# Patient Record
Sex: FEMALE | Race: OTHER | HISPANIC OR LATINO | URBAN - METROPOLITAN AREA
[De-identification: names, ages, dates, MRNs, and addresses within clinical notes are randomized per-mention and may not be internally consistent; named-entity substitution may affect disease eponyms.]

---

## 2020-07-01 ENCOUNTER — INPATIENT (INPATIENT)
Facility: HOSPITAL | Age: 75
LOS: 7 days | Discharge: NURSING, REHAB OR LONG-TERM CARE | DRG: 300 | End: 2020-07-09
Attending: STUDENT IN AN ORGANIZED HEALTH CARE EDUCATION/TRAINING PROGRAM | Admitting: STUDENT IN AN ORGANIZED HEALTH CARE EDUCATION/TRAINING PROGRAM
Payer: MEDICAID

## 2020-07-01 VITALS
WEIGHT: 164.91 LBS | SYSTOLIC BLOOD PRESSURE: 163 MMHG | DIASTOLIC BLOOD PRESSURE: 70 MMHG | TEMPERATURE: 98 F | OXYGEN SATURATION: 98 % | RESPIRATION RATE: 18 BRPM | HEART RATE: 88 BPM

## 2020-07-01 DIAGNOSIS — I21.4 NON-ST ELEVATION (NSTEMI) MYOCARDIAL INFARCTION: ICD-10-CM

## 2020-07-01 DIAGNOSIS — I80.219 PHLEBITIS AND THROMBOPHLEBITIS OF UNSPECIFIED ILIAC VEIN: ICD-10-CM

## 2020-07-01 DIAGNOSIS — I82.90 ACUTE EMBOLISM AND THROMBOSIS OF UNSPECIFIED VEIN: ICD-10-CM

## 2020-07-01 DIAGNOSIS — I69.359 HEMIPLEGIA AND HEMIPARESIS FOLLOWING CEREBRAL INFARCTION AFFECTING UNSPECIFIED SIDE: ICD-10-CM

## 2020-07-01 DIAGNOSIS — F03.90 UNSPECIFIED DEMENTIA WITHOUT BEHAVIORAL DISTURBANCE: ICD-10-CM

## 2020-07-01 DIAGNOSIS — Z29.9 ENCOUNTER FOR PROPHYLACTIC MEASURES, UNSPECIFIED: ICD-10-CM

## 2020-07-01 LAB
A1C WITH ESTIMATED AVERAGE GLUCOSE RESULT: 5.1 % — SIGNIFICANT CHANGE UP (ref 4–5.6)
ALBUMIN SERPL ELPH-MCNC: 3.7 G/DL — SIGNIFICANT CHANGE UP (ref 3.5–5)
ALP SERPL-CCNC: 58 U/L — SIGNIFICANT CHANGE UP (ref 40–120)
ALT FLD-CCNC: 33 U/L DA — SIGNIFICANT CHANGE UP (ref 10–60)
ANION GAP SERPL CALC-SCNC: 9 MMOL/L — SIGNIFICANT CHANGE UP (ref 5–17)
APTT BLD: 28.4 SEC — SIGNIFICANT CHANGE UP (ref 27.5–35.5)
AST SERPL-CCNC: 23 U/L — SIGNIFICANT CHANGE UP (ref 10–40)
BASOPHILS # BLD AUTO: 0.02 K/UL — SIGNIFICANT CHANGE UP (ref 0–0.2)
BASOPHILS NFR BLD AUTO: 0.5 % — SIGNIFICANT CHANGE UP (ref 0–2)
BILIRUB SERPL-MCNC: 1.1 MG/DL — SIGNIFICANT CHANGE UP (ref 0.2–1.2)
BUN SERPL-MCNC: 8 MG/DL — SIGNIFICANT CHANGE UP (ref 7–18)
CALCIUM SERPL-MCNC: 9.8 MG/DL — SIGNIFICANT CHANGE UP (ref 8.4–10.5)
CHLORIDE SERPL-SCNC: 105 MMOL/L — SIGNIFICANT CHANGE UP (ref 96–108)
CK MB BLD-MCNC: <2.8 % — SIGNIFICANT CHANGE UP (ref 0–3.5)
CK MB CFR SERPL CALC: <1 NG/ML — SIGNIFICANT CHANGE UP (ref 0–3.6)
CK SERPL-CCNC: 36 U/L — SIGNIFICANT CHANGE UP (ref 21–215)
CO2 SERPL-SCNC: 26 MMOL/L — SIGNIFICANT CHANGE UP (ref 22–31)
CREAT SERPL-MCNC: 0.66 MG/DL — SIGNIFICANT CHANGE UP (ref 0.5–1.3)
EOSINOPHIL # BLD AUTO: 0.11 K/UL — SIGNIFICANT CHANGE UP (ref 0–0.5)
EOSINOPHIL NFR BLD AUTO: 2.8 % — SIGNIFICANT CHANGE UP (ref 0–6)
ESTIMATED AVERAGE GLUCOSE: 100 MG/DL — SIGNIFICANT CHANGE UP (ref 68–114)
GLUCOSE SERPL-MCNC: 118 MG/DL — HIGH (ref 70–99)
HCT VFR BLD CALC: 41.2 % — SIGNIFICANT CHANGE UP (ref 34.5–45)
HGB BLD-MCNC: 13.1 G/DL — SIGNIFICANT CHANGE UP (ref 11.5–15.5)
IMM GRANULOCYTES NFR BLD AUTO: 0.3 % — SIGNIFICANT CHANGE UP (ref 0–1.5)
INR BLD: 1.09 RATIO — SIGNIFICANT CHANGE UP (ref 0.88–1.16)
LIDOCAIN IGE QN: 165 U/L — SIGNIFICANT CHANGE UP (ref 73–393)
LYMPHOCYTES # BLD AUTO: 1.3 K/UL — SIGNIFICANT CHANGE UP (ref 1–3.3)
LYMPHOCYTES # BLD AUTO: 32.7 % — SIGNIFICANT CHANGE UP (ref 13–44)
MCHC RBC-ENTMCNC: 27.9 PG — SIGNIFICANT CHANGE UP (ref 27–34)
MCHC RBC-ENTMCNC: 31.8 GM/DL — LOW (ref 32–36)
MCV RBC AUTO: 87.7 FL — SIGNIFICANT CHANGE UP (ref 80–100)
MONOCYTES # BLD AUTO: 0.28 K/UL — SIGNIFICANT CHANGE UP (ref 0–0.9)
MONOCYTES NFR BLD AUTO: 7.1 % — SIGNIFICANT CHANGE UP (ref 2–14)
NEUTROPHILS # BLD AUTO: 2.25 K/UL — SIGNIFICANT CHANGE UP (ref 1.8–7.4)
NEUTROPHILS NFR BLD AUTO: 56.6 % — SIGNIFICANT CHANGE UP (ref 43–77)
NRBC # BLD: 0 /100 WBCS — SIGNIFICANT CHANGE UP (ref 0–0)
PLATELET # BLD AUTO: 228 K/UL — SIGNIFICANT CHANGE UP (ref 150–400)
POTASSIUM SERPL-MCNC: 3.1 MMOL/L — LOW (ref 3.5–5.3)
POTASSIUM SERPL-SCNC: 3.1 MMOL/L — LOW (ref 3.5–5.3)
PROT SERPL-MCNC: 7.1 G/DL — SIGNIFICANT CHANGE UP (ref 6–8.3)
PROTHROM AB SERPL-ACNC: 12.7 SEC — SIGNIFICANT CHANGE UP (ref 10.6–13.6)
RBC # BLD: 4.7 M/UL — SIGNIFICANT CHANGE UP (ref 3.8–5.2)
RBC # FLD: 13.8 % — SIGNIFICANT CHANGE UP (ref 10.3–14.5)
SARS-COV-2 RNA SPEC QL NAA+PROBE: SIGNIFICANT CHANGE UP
SODIUM SERPL-SCNC: 140 MMOL/L — SIGNIFICANT CHANGE UP (ref 135–145)
TROPONIN I SERPL-MCNC: 0.09 NG/ML — HIGH (ref 0–0.04)
TROPONIN I SERPL-MCNC: 0.13 NG/ML — HIGH (ref 0–0.04)
TROPONIN I SERPL-MCNC: 0.14 NG/ML — HIGH (ref 0–0.04)
WBC # BLD: 3.97 K/UL — SIGNIFICANT CHANGE UP (ref 3.8–10.5)
WBC # FLD AUTO: 3.97 K/UL — SIGNIFICANT CHANGE UP (ref 3.8–10.5)

## 2020-07-01 PROCEDURE — 93970 EXTREMITY STUDY: CPT | Mod: 26

## 2020-07-01 PROCEDURE — 99284 EMERGENCY DEPT VISIT MOD MDM: CPT

## 2020-07-01 PROCEDURE — 99222 1ST HOSP IP/OBS MODERATE 55: CPT

## 2020-07-01 PROCEDURE — 74177 CT ABD & PELVIS W/CONTRAST: CPT | Mod: 26

## 2020-07-01 PROCEDURE — 99223 1ST HOSP IP/OBS HIGH 75: CPT | Mod: AI,GC

## 2020-07-01 PROCEDURE — 71045 X-RAY EXAM CHEST 1 VIEW: CPT | Mod: 26

## 2020-07-01 RX ORDER — HEPARIN SODIUM 5000 [USP'U]/ML
3000 INJECTION INTRAVENOUS; SUBCUTANEOUS EVERY 6 HOURS
Refills: 0 | Status: DISCONTINUED | OUTPATIENT
Start: 2020-07-01 | End: 2020-07-01

## 2020-07-01 RX ORDER — ATORVASTATIN CALCIUM 80 MG/1
40 TABLET, FILM COATED ORAL AT BEDTIME
Refills: 0 | Status: DISCONTINUED | OUTPATIENT
Start: 2020-07-01 | End: 2020-07-09

## 2020-07-01 RX ORDER — ASPIRIN/CALCIUM CARB/MAGNESIUM 324 MG
81 TABLET ORAL DAILY
Refills: 0 | Status: DISCONTINUED | OUTPATIENT
Start: 2020-07-01 | End: 2020-07-09

## 2020-07-01 RX ORDER — ONDANSETRON 8 MG/1
4 TABLET, FILM COATED ORAL ONCE
Refills: 0 | Status: DISCONTINUED | OUTPATIENT
Start: 2020-07-01 | End: 2020-07-09

## 2020-07-01 RX ORDER — HEPARIN SODIUM 5000 [USP'U]/ML
6000 INJECTION INTRAVENOUS; SUBCUTANEOUS ONCE
Refills: 0 | Status: COMPLETED | OUTPATIENT
Start: 2020-07-01 | End: 2020-07-01

## 2020-07-01 RX ORDER — HEPARIN SODIUM 5000 [USP'U]/ML
INJECTION INTRAVENOUS; SUBCUTANEOUS
Qty: 25000 | Refills: 0 | Status: DISCONTINUED | OUTPATIENT
Start: 2020-07-01 | End: 2020-07-01

## 2020-07-01 RX ORDER — HEPARIN SODIUM 5000 [USP'U]/ML
6000 INJECTION INTRAVENOUS; SUBCUTANEOUS EVERY 6 HOURS
Refills: 0 | Status: DISCONTINUED | OUTPATIENT
Start: 2020-07-01 | End: 2020-07-01

## 2020-07-01 RX ORDER — ENOXAPARIN SODIUM 100 MG/ML
70 INJECTION SUBCUTANEOUS EVERY 12 HOURS
Refills: 0 | Status: DISCONTINUED | OUTPATIENT
Start: 2020-07-01 | End: 2020-07-03

## 2020-07-01 RX ORDER — DEXTROSE MONOHYDRATE, SODIUM CHLORIDE, AND POTASSIUM CHLORIDE 50; .745; 4.5 G/1000ML; G/1000ML; G/1000ML
1000 INJECTION, SOLUTION INTRAVENOUS
Refills: 0 | Status: DISCONTINUED | OUTPATIENT
Start: 2020-07-01 | End: 2020-07-06

## 2020-07-01 RX ORDER — ASPIRIN/CALCIUM CARB/MAGNESIUM 324 MG
325 TABLET ORAL ONCE
Refills: 0 | Status: COMPLETED | OUTPATIENT
Start: 2020-07-01 | End: 2020-07-01

## 2020-07-01 RX ORDER — POTASSIUM CHLORIDE 20 MEQ
10 PACKET (EA) ORAL
Refills: 0 | Status: COMPLETED | OUTPATIENT
Start: 2020-07-01 | End: 2020-07-01

## 2020-07-01 RX ORDER — ENOXAPARIN SODIUM 100 MG/ML
40 INJECTION SUBCUTANEOUS ONCE
Refills: 0 | Status: COMPLETED | OUTPATIENT
Start: 2020-07-01 | End: 2020-07-01

## 2020-07-01 RX ORDER — MORPHINE SULFATE 50 MG/1
4 CAPSULE, EXTENDED RELEASE ORAL ONCE
Refills: 0 | Status: DISCONTINUED | OUTPATIENT
Start: 2020-07-01 | End: 2020-07-01

## 2020-07-01 RX ORDER — OXYCODONE HYDROCHLORIDE 5 MG/1
5 TABLET ORAL ONCE
Refills: 0 | Status: DISCONTINUED | OUTPATIENT
Start: 2020-07-01 | End: 2020-07-01

## 2020-07-01 RX ORDER — METOPROLOL TARTRATE 50 MG
25 TABLET ORAL
Refills: 0 | Status: DISCONTINUED | OUTPATIENT
Start: 2020-07-01 | End: 2020-07-02

## 2020-07-01 RX ORDER — SODIUM CHLORIDE 9 MG/ML
1000 INJECTION INTRAMUSCULAR; INTRAVENOUS; SUBCUTANEOUS ONCE
Refills: 0 | Status: COMPLETED | OUTPATIENT
Start: 2020-07-01 | End: 2020-07-01

## 2020-07-01 RX ADMIN — Medication 100 MILLIEQUIVALENT(S): at 10:18

## 2020-07-01 RX ADMIN — ENOXAPARIN SODIUM 40 MILLIGRAM(S): 100 INJECTION SUBCUTANEOUS at 21:37

## 2020-07-01 RX ADMIN — DEXTROSE MONOHYDRATE, SODIUM CHLORIDE, AND POTASSIUM CHLORIDE 75 MILLILITER(S): 50; .745; 4.5 INJECTION, SOLUTION INTRAVENOUS at 14:29

## 2020-07-01 RX ADMIN — HEPARIN SODIUM 1300 UNIT(S)/HR: 5000 INJECTION INTRAVENOUS; SUBCUTANEOUS at 08:52

## 2020-07-01 RX ADMIN — Medication 325 MILLIGRAM(S): at 08:51

## 2020-07-01 RX ADMIN — ATORVASTATIN CALCIUM 40 MILLIGRAM(S): 80 TABLET, FILM COATED ORAL at 21:38

## 2020-07-01 RX ADMIN — SODIUM CHLORIDE 1000 MILLILITER(S): 9 INJECTION INTRAMUSCULAR; INTRAVENOUS; SUBCUTANEOUS at 04:41

## 2020-07-01 RX ADMIN — OXYCODONE HYDROCHLORIDE 5 MILLIGRAM(S): 5 TABLET ORAL at 08:51

## 2020-07-01 RX ADMIN — Medication 25 MILLIGRAM(S): at 17:29

## 2020-07-01 RX ADMIN — Medication 100 MILLIEQUIVALENT(S): at 08:51

## 2020-07-01 RX ADMIN — Medication 100 MILLIEQUIVALENT(S): at 11:45

## 2020-07-01 RX ADMIN — MORPHINE SULFATE 4 MILLIGRAM(S): 50 CAPSULE, EXTENDED RELEASE ORAL at 05:59

## 2020-07-01 RX ADMIN — HEPARIN SODIUM 6000 UNIT(S): 5000 INJECTION INTRAVENOUS; SUBCUTANEOUS at 08:50

## 2020-07-01 NOTE — H&P ADULT - PROBLEM SELECTOR PLAN 3
- Patient with residual left sided paresi and left facial droop  - telemetry  - Primary team to f/u with family for medication and PMH

## 2020-07-01 NOTE — ED ADULT NURSE NOTE - ED STAT RN HANDOFF DETAILS
Endorsed from MICHELLE Staley pt [place] speaking  [id number] used pt unable to communicate via . 20 G SL right wrist patent. 20 G Sl left AC inserted PTT drawn and Heparin infusion started st 13ml/hr. Right hemiparesis. Pt able to swallow pills and liquid w/o difficulty. Placed on cardiac monitor om

## 2020-07-01 NOTE — ED PROVIDER NOTE - PSYCHIATRIC, MLM
Alert and oriented to person, place, time/situation. normal mood and affect. no apparent risk to self or others. Alert and oriented to person. Right sided weakness evident.

## 2020-07-01 NOTE — CONSULT NOTE ADULT - SUBJECTIVE AND OBJECTIVE BOX
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  Pt is poor historian and can't give history. She states she feels better. , EMS was called by grandson for abdominal pain and one episode of vomiting at 2am.On my evaluation patient is poor historian, AOX1-2 (self, place), forgetful , does not remember grandson name or phone number. Patient c/o generalized abdominal pain, aching in nature, 7/10, radiation to back , associated with nausea. (01 Jul 2020 10:12)      PAST MEDICAL & SURGICAL HISTORY:      Allergies    No Known Allergies    Intolerances        MEDICATIONS  (STANDING):  heparin  Infusion.  Unit(s)/Hr (13 mL/Hr) IV Continuous <Continuous>  ondansetron Injectable 4 milliGRAM(s) IV Push once  potassium chloride  10 mEq/100 mL IVPB 10 milliEquivalent(s) IV Intermittent every 1 hour    MEDICATIONS  (PRN):  heparin   Injectable 6000 Unit(s) IV Push every 6 hours PRN For aPTT less than 40  heparin   Injectable 3000 Unit(s) IV Push every 6 hours PRN For aPTT between 40 - 57      Vital Signs Last 24 Hrs  T(C): 36.6 (01 Jul 2020 07:20), Max: 36.9 (01 Jul 2020 04:11)  T(F): 97.9 (01 Jul 2020 07:20), Max: 98.5 (01 Jul 2020 04:11)  HR: 101 (01 Jul 2020 10:22) (86 - 101)  BP: 150/75 (01 Jul 2020 07:20) (150/75 - 163/70)  BP(mean): --  RR: 16 (01 Jul 2020 07:20) (16 - 18)  SpO2: 97% (01 Jul 2020 07:20) (97% - 98%)    Physical Exam:  Gen:  HEENT:  Abd:  Back:  Pelvic:  Ext:    Labs:                          13.1   3.97  )-----------( 228      ( 01 Jul 2020 04:57 )             41.2     07-01    140  |  105  |  8   ----------------------------<  118<H>  3.1<L>   |  26  |  0.66    Ca    9.8      01 Jul 2020 04:57    TPro  7.1  /  Alb  3.7  /  TBili  1.1  /  DBili  x   /  AST  23  /  ALT  33  /  AlkPhos  58  07-01    PT/INR - ( 01 Jul 2020 09:09 )   PT: 12.7 sec;   INR: 1.09 ratio         PTT - ( 01 Jul 2020 09:09 )  PTT:28.4 sec      Cultures:    Radiological Exams: Vascular Surgery  Consultation Note    Patient is a 74y old  Female who presents with a chief complaint of abdominal pain and vomiting    HPI:  74y F from home, lives with grand son, ambulates with walker/cane PMH Dementia , 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  Pt is poor historian and can't give history. She states she feels better. She complains of right calf pain with movement. No other complaints. No other history can be obtained. Pt doesn't remember number of her daughter whom she also lives with.     PAST MEDICAL & SURGICAL HISTORY:      Allergies    No Known Allergies    Intolerances        MEDICATIONS  (STANDING):  heparin  Infusion.  Unit(s)/Hr (13 mL/Hr) IV Continuous <Continuous>  ondansetron Injectable 4 milliGRAM(s) IV Push once  potassium chloride  10 mEq/100 mL IVPB 10 milliEquivalent(s) IV Intermittent every 1 hour    MEDICATIONS  (PRN):  heparin   Injectable 6000 Unit(s) IV Push every 6 hours PRN For aPTT less than 40  heparin   Injectable 3000 Unit(s) IV Push every 6 hours PRN For aPTT between 40 - 57      Vital Signs Last 24 Hrs  T(C): 36.6 (01 Jul 2020 07:20), Max: 36.9 (01 Jul 2020 04:11)  T(F): 97.9 (01 Jul 2020 07:20), Max: 98.5 (01 Jul 2020 04:11)  HR: 101 (01 Jul 2020 10:22) (86 - 101)  BP: 150/75 (01 Jul 2020 07:20) (150/75 - 163/70)  BP(mean): --  RR: 16 (01 Jul 2020 07:20) (16 - 18)  SpO2: 97% (01 Jul 2020 07:20) (97% - 98%)    Physical Exam:  Gen: AO x1  Abd: obese, soft, + tenderness right upper abdomen primarily but tender throughout. not distended or tympanic  Ext: warm to touch no c/c/e, Right calf tenderness with palpation.  No swelling bilaterally  Vasc: good capillary refill, palpable pulses bilateral lower extremity.     Labs:                          13.1   3.97  )-----------( 228      ( 01 Jul 2020 04:57 )             41.2     07-01    140  |  105  |  8   ----------------------------<  118<H>  3.1<L>   |  26  |  0.66    Ca    9.8      01 Jul 2020 04:57    TPro  7.1  /  Alb  3.7  /  TBili  1.1  /  DBili  x   /  AST  23  /  ALT  33  /  AlkPhos  58  07-01    PT/INR - ( 01 Jul 2020 09:09 )   PT: 12.7 sec;   INR: 1.09 ratio         PTT - ( 01 Jul 2020 09:09 )  PTT:28.4 sec      Radiological Exams:  < from: CT Abdomen and Pelvis w/ IV Cont (07.01.20 @ 06:18) >  IMPRESSION:    1.  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 Critical results discussed with Dr. Sharma  at 6:40 AM.    < end of copied text >

## 2020-07-01 NOTE — H&P ADULT - PROBLEM SELECTOR PLAN 1
Patient presented with episode of gastric pain and vomiting which resolved while admitted on ED   Abdominal CT scan yielded partial thrombosis of the left common and internal iliac vein. Patient was placed on heparin drip full dose which switched to full dose Lovenox  - vascular consult appreciated  -Doppler negative for LE DVTs  - cw full dose Lovenox  - patient on clear liquid  - advance diet to soft AM Patient presented with episode of gastric pain and vomiting which resolved while admitted on ED   Abdominal CT scan yielded partial thrombosis of the left common and internal iliac vein. Patient was placed on heparin drip full dose which switched to full dose Lovenox later in afternoon  - vascular consult appreciated  -Doppler negative for LE DVTs  - cw full dose Lovenox  - patient on clear liquid  - advance diet to soft AM

## 2020-07-01 NOTE — ED PROVIDER NOTE - CLINICAL SUMMARY MEDICAL DECISION MAKING FREE TEXT BOX
73yo F with hx dementia presents with abdominal pain and vomiting. Will obtain ekg, labs, CXR, CT A/P. Given morphine for pain, zofran and IVF. Will reassess. 73yo F with hx dementia presents with abdominal pain and vomiting. Will obtain ekg, labs, CXR, CT A/P. Given morphine for pain, zofran and IVF. Will reassess.    ekg shows STD, trop 0.091-given ASA  CT shows 1.  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 Critical results discussed with Dr. Sharma at 6:40 AM.  2.  Benign-appearing left ovarian cyst measures 2.7 cm. Sonographic correlation is advised given the size and patient's postmenopausal state.  @[id number] vascular surgery PA consulted, awaiting evaluation and recommendations  Discussed above with Dr. Mullins who recommends starting heparin infusion and admitted under daytime hospitalist Dr. Lemon.

## 2020-07-01 NOTE — H&P ADULT - PROBLEM SELECTOR PLAN 5
c/w full dose Lovenox IMPROVE VTE Individual Risk Assessment    RISK                                                                Points  [  ] Previous VTE                                                  3  [  ] Thrombophilia                                               2  [  ] Lower limb paralysis                                      2        (unable to hold up >15 seconds)    [  ] Current Cancer                                              2         (within 6 months)  [x  ] Immobilization > 24 hrs                                1  [  ] ICU/CCU stay > 24 hours                              1  [  x] Age > 60                                                      1  IMPROVE VTE Score _________

## 2020-07-01 NOTE — H&P ADULT - ASSESSMENT
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 On my evaluation patient is poor historian, AOX1-2 (self, place), forgetful , does not remember grandson name or phone number. Patient c/o generalized abdominal pain, aching in nature, 7/10, radiation to back , associated with nausea. Multiple unsuccessful attempts were made to call grandson with no success.

## 2020-07-01 NOTE — H&P ADULT - HISTORY OF PRESENT ILLNESS
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 Patient c/o generalized abdominal pain, aching in nature, 7/10, radiation to back , associated with nausea. 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 On my evaluation patient is poor historian, AOX1-2 (self, place), forgetful , does not remember grandson name or phone number. Patient c/o generalized abdominal pain, aching in nature, 7/10, radiation to back , associated with nausea. 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 On my evaluation patient is poor historian, AOX1-2 (self, place), forgetful , does not remember grandson name or phone number. Patient c/o generalized abdominal pain, aching in nature, 7/10, radiation to back , associated with nausea. Multiple unsuccessful attempts were made to call grandjanes with no success.      ED course: Partial thrombosis of the left common and internal iliac vein. Patient was placed on heparin drip full dose.    GOC : patient will remain full code for now,  consulted to contact [facility].

## 2020-07-01 NOTE — CONSULT NOTE ADULT - ATTENDING COMMENTS
As above  L CIV/IIV thrombus.  Partial compression of L CIV (May Thurner)  LEs: no edema/congestion  DOubt etiology for pt;s symptoms.    REc:   Tx AC if safe  LE's vUS R/O DVT  Venodynes in hospital   COmpression stockings post DC (20-30mmHg knee highs)  Reimage in 3 months (CTV A/P)  FU outpt for surveillance

## 2020-07-01 NOTE — H&P ADULT - ATTENDING COMMENTS
INCOMPLETE NOTE:    Patient seen and examined; Agree with PGY2 MAR A/P above with editing as needed. My independent assessment, findings on exam, diagnosis and plan of care as listed below. Discussed with CATHERINE Lundberg    Vital Signs Last 24 Hrs  T(C): 36.4 (01 Jul 2020 11:30), Max: 36.9 (01 Jul 2020 04:11)  T(F): 97.6 (01 Jul 2020 11:30), Max: 98.5 (01 Jul 2020 04:11)  HR: 76 (01 Jul 2020 11:30) (76 - 101)  BP: 156/76 (01 Jul 2020 11:30) (150/75 - 163/70)  BP(mean): --  RR: 16 (01 Jul 2020 11:30) (16 - 18)  SpO2: 97% (01 Jul 2020 11:30) (97% - 98%) Patient seen and examined earlier this afternoon; Agree with PGY2 MAR A/P above with editing as needed. My independent assessment, findings on exam, diagnosis and plan of care as listed below. 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     Unable to obtain a good history as patient has underlying dementia. Patient denied any pain on my examination this afternoon. No vomiting; patient was given Orange juice 8 oz which she drank without any discomfort, nausea or vomiting and no evidence of coughing/ choking. Patient has Hx CVA with right hemiparesis.     Vital Signs Last 24 Hrs  T(C): 36.4 (01 Jul 2020 11:30), Max: 36.9 (01 Jul 2020 04:11)  T(F): 97.6 (01 Jul 2020 11:30), Max: 98.5 (01 Jul 2020 04:11)  HR: 76 (01 Jul 2020 11:30) (76 - 101)  BP: 156/76 (01 Jul 2020 11:30) (150/75 - 163/70)  RR: 16 (01 Jul 2020 11:30) (16 - 18)  SpO2: 97% (01 Jul 2020 11:30) (97% - 98%)    P/E:  Elderly female, comfortable at rest, dementia but followed simple commands  Neuro: AAO x2; No acute focal deficits; residual right upper extremity weakness 3/5;   B/L LE 4/5 symmetrical   CVS: S1S2 present, regular  Resp: BLAE+, No wheeze or Rhonchi  GI: Soft, BS+, NT  Extr: No edema or calf tenderness    Labs:                        13.1   3.97  )-----------( 228      ( 01 Jul 2020 04:57 )             41.2   07-01    140  |  105  |  8   ----------------------------<  118<H>  3.1<L>   |  26  |  0.66    Ca    9.8      01 Jul 2020 04:57    TPro  7.1  /  Alb  3.7  /  TBili  1.1  /  DBili  x   /  AST  23  /  ALT  33  /  AlkPhos  58  07-01    Troponin I, Serum (07.01.20 @ 10:34)    Troponin I, Serum: 0.129:       CT Abdomen and Pelvis w/ IV Cont (07.01.20 @ 06:18)    IMPRESSION:  1.  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 Critical results discussed with Dr. Sharma  at 6:40 AM.  2.  Benign-appearing left ovarian cyst measures 2.7 cm. Sonographic correlation is advised given the size and patient's postmenopausal state.    < from: US Duplex Venous Lower Ext Complete, Bilateral (07.01.20 @ 14:14) >    IMPRESSION: No evidence of deep venous thrombosis in either lower extremity.    D/D:  Abdominal pain and Vomiting likely Acute Gastroenteritis resolving  Elevated Troponin likely Demand ischemia with concern for NSTEMI, although less likely  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 Hand   Vascular Sx consult appreciated; recommended anticoagulation  Patient was placed on Heparin drip in ED; Due to patient underlying Dementia and need for frequent blood draws for PTT monitoring on heparin, will  prefer to switch to Lovenox therapeutic dose; d/w Vascular Sx; a/c with either Lovenox or heparin is okay.   D/C Heparin around 2.30 PM with plan to switch to Lovenox 1mg/kg q 12 hr from 9PM. d/w RN  Unable to reach family/ grandson through day; d/w RN ED and Floor to obtain contact information if family calls back  Needs to obtain medication list  Patient has no dysphagia to liquids; Advanced to Full liquid diet this afternoon; Advance to soft diet in AM if no further vomiting or abdominal pain overnight    Discussed with PGY2 CATHERINE Lundberg and RN [facility] ED Hold and Goldy RN Floor

## 2020-07-01 NOTE — H&P ADULT - PROBLEM SELECTOR PLAN 2
patient presented with elevated troponin , EKG NSR with no ST-T segment changes  Patient was on full dose heparin for new DVt switched to full dose lovenox  - trend troponin   -c/w full dose lovenox  - f/u Echo  - telemetry  ****cardiology Dr Hand patient presented with elevated troponin , EKG NSR with nonspecific ST-T segment changes  Patient was on full dose heparin for new DVt switched to full dose lovenox  - trend troponin   -c/w full dose Lovenox  - f/u Echo  - telemetry  ****cardiology Dr Hand

## 2020-07-01 NOTE — ED PROVIDER NOTE - OBJECTIVE STATEMENT
75yo F with hx dementia presents with abdominal pain and vomiting. Per EMS patient picked up from home where grandson reported patient was complaining of left sided abdominal pain and vomited x1 at 2am. 73yo F with hx dementia, CVA with right sided hemiparesis presents with abdominal pain and vomiting. Per EMS patient picked up from home where grandson reported patient was complaining of left sided abdominal pain and vomited x1 at 2am.

## 2020-07-01 NOTE — ED PROVIDER NOTE - CARE PLAN
Principal Discharge DX:	NSTEMI (non-ST elevated myocardial infarction)  Secondary Diagnosis:	Thrombosis of vein

## 2020-07-02 DIAGNOSIS — N83.209 UNSPECIFIED OVARIAN CYST, UNSPECIFIED SIDE: ICD-10-CM

## 2020-07-02 LAB
ANION GAP SERPL CALC-SCNC: 9 MMOL/L — SIGNIFICANT CHANGE UP (ref 5–17)
BUN SERPL-MCNC: 4 MG/DL — LOW (ref 7–18)
CALCIUM SERPL-MCNC: 9.5 MG/DL — SIGNIFICANT CHANGE UP (ref 8.4–10.5)
CHLORIDE SERPL-SCNC: 106 MMOL/L — SIGNIFICANT CHANGE UP (ref 96–108)
CK MB BLD-MCNC: 3.2 % — SIGNIFICANT CHANGE UP (ref 0–3.5)
CK MB CFR SERPL CALC: 1.2 NG/ML — SIGNIFICANT CHANGE UP (ref 0–3.6)
CK SERPL-CCNC: 38 U/L — SIGNIFICANT CHANGE UP (ref 21–215)
CO2 SERPL-SCNC: 26 MMOL/L — SIGNIFICANT CHANGE UP (ref 22–31)
CREAT SERPL-MCNC: 0.58 MG/DL — SIGNIFICANT CHANGE UP (ref 0.5–1.3)
GLUCOSE SERPL-MCNC: 103 MG/DL — HIGH (ref 70–99)
HCT VFR BLD CALC: 37.1 % — SIGNIFICANT CHANGE UP (ref 34.5–45)
HCV AB S/CO SERPL IA: 0.13 S/CO — SIGNIFICANT CHANGE UP (ref 0–0.99)
HCV AB SERPL-IMP: SIGNIFICANT CHANGE UP
HGB BLD-MCNC: 11.5 G/DL — SIGNIFICANT CHANGE UP (ref 11.5–15.5)
MAGNESIUM SERPL-MCNC: 1.6 MG/DL — SIGNIFICANT CHANGE UP (ref 1.6–2.6)
MCHC RBC-ENTMCNC: 27.6 PG — SIGNIFICANT CHANGE UP (ref 27–34)
MCHC RBC-ENTMCNC: 31 GM/DL — LOW (ref 32–36)
MCV RBC AUTO: 89 FL — SIGNIFICANT CHANGE UP (ref 80–100)
NRBC # BLD: 0 /100 WBCS — SIGNIFICANT CHANGE UP (ref 0–0)
PHOSPHATE SERPL-MCNC: 3 MG/DL — SIGNIFICANT CHANGE UP (ref 2.5–4.5)
PLATELET # BLD AUTO: 243 K/UL — SIGNIFICANT CHANGE UP (ref 150–400)
POTASSIUM SERPL-MCNC: 3.6 MMOL/L — SIGNIFICANT CHANGE UP (ref 3.5–5.3)
POTASSIUM SERPL-SCNC: 3.6 MMOL/L — SIGNIFICANT CHANGE UP (ref 3.5–5.3)
RBC # BLD: 4.17 M/UL — SIGNIFICANT CHANGE UP (ref 3.8–5.2)
RBC # FLD: 14 % — SIGNIFICANT CHANGE UP (ref 10.3–14.5)
SARS-COV-2 IGG SERPL QL IA: POSITIVE
SARS-COV-2 IGM SERPL IA-ACNC: 13.4 INDEX — HIGH
SODIUM SERPL-SCNC: 141 MMOL/L — SIGNIFICANT CHANGE UP (ref 135–145)
TROPONIN I SERPL-MCNC: 0.16 NG/ML — HIGH (ref 0–0.04)
TROPONIN I SERPL-MCNC: 0.17 NG/ML — HIGH (ref 0–0.04)
TSH SERPL-MCNC: 0.6 UU/ML — SIGNIFICANT CHANGE UP (ref 0.34–4.82)
WBC # BLD: 4.8 K/UL — SIGNIFICANT CHANGE UP (ref 3.8–10.5)
WBC # FLD AUTO: 4.8 K/UL — SIGNIFICANT CHANGE UP (ref 3.8–10.5)

## 2020-07-02 PROCEDURE — 99233 SBSQ HOSP IP/OBS HIGH 50: CPT | Mod: GC

## 2020-07-02 PROCEDURE — 93306 TTE W/DOPPLER COMPLETE: CPT | Mod: 26

## 2020-07-02 RX ORDER — APIXABAN 2.5 MG/1
1 TABLET, FILM COATED ORAL
Qty: 60 | Refills: 0
Start: 2020-07-02 | End: 2020-07-31

## 2020-07-02 RX ORDER — PANTOPRAZOLE SODIUM 20 MG/1
40 TABLET, DELAYED RELEASE ORAL
Refills: 0 | Status: DISCONTINUED | OUTPATIENT
Start: 2020-07-02 | End: 2020-07-09

## 2020-07-02 RX ORDER — ACETAMINOPHEN 500 MG
650 TABLET ORAL EVERY 4 HOURS
Refills: 0 | Status: DISCONTINUED | OUTPATIENT
Start: 2020-07-02 | End: 2020-07-09

## 2020-07-02 RX ORDER — RIVAROXABAN 15 MG-20MG
1 KIT ORAL
Qty: 42 | Refills: 0
Start: 2020-07-02 | End: 2020-07-22

## 2020-07-02 RX ORDER — DIGOXIN 250 MCG
0.12 TABLET ORAL DAILY
Refills: 0 | Status: DISCONTINUED | OUTPATIENT
Start: 2020-07-02 | End: 2020-07-09

## 2020-07-02 RX ORDER — METOPROLOL TARTRATE 50 MG
25 TABLET ORAL
Refills: 0 | Status: DISCONTINUED | OUTPATIENT
Start: 2020-07-02 | End: 2020-07-09

## 2020-07-02 RX ADMIN — ATORVASTATIN CALCIUM 40 MILLIGRAM(S): 80 TABLET, FILM COATED ORAL at 21:42

## 2020-07-02 RX ADMIN — ENOXAPARIN SODIUM 70 MILLIGRAM(S): 100 INJECTION SUBCUTANEOUS at 06:00

## 2020-07-02 RX ADMIN — Medication 81 MILLIGRAM(S): at 11:40

## 2020-07-02 RX ADMIN — Medication 25 MILLIGRAM(S): at 17:51

## 2020-07-02 RX ADMIN — Medication 650 MILLIGRAM(S): at 16:10

## 2020-07-02 RX ADMIN — Medication 25 MILLIGRAM(S): at 06:00

## 2020-07-02 RX ADMIN — ENOXAPARIN SODIUM 70 MILLIGRAM(S): 100 INJECTION SUBCUTANEOUS at 17:51

## 2020-07-02 RX ADMIN — Medication 0.12 MILLIGRAM(S): at 13:11

## 2020-07-02 NOTE — CONSULT NOTE ADULT - ASSESSMENT
Partial thrombosis of Left common and internal iliac with compression from left femoral artery c/w May Thurner syndrome.   1. Agree with anticoagulation  2. Recommend venous doppler of b/l LE r/o DVT  3. Pt is not a candidate for surgical intervention  4. Can f/u with Dr. Huang as outpatient  5. D/w Dr. Huang and agreed
74 year old lady presented with N/V and abdominal pain.. CT showed a partial thrombus at left common iliac vein.

## 2020-07-02 NOTE — PROGRESS NOTE ADULT - ATTENDING COMMENTS
Patient seen and examined earlier this afternoon with PGY1; Agree with PGY1 A/P above with editing as needed. My independent assessment, findings on exam, diagnosis and plan of care as listed below. Discussed with Dr. De La Rosa.    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    Vomiting and abdominal pain has resolved since admission; Tolerated full liquid diet since yesterday. Diet was advanced this afternoon to soft diet which patient ate about 50% as per RN. Patient denied any chest pain or SOB.     Vital Signs Last 24 Hrs  T(C): 36.9 (02 Jul 2020 15:25), Max: 37 (02 Jul 2020 07:25)  T(F): 98.5 (02 Jul 2020 15:25), Max: 98.6 (02 Jul 2020 07:25)  HR: 83 (02 Jul 2020 15:25) (64 - 86)  BP: 152/77 (02 Jul 2020 15:25) (124/73 - 163/78)  RR: 18 (02 Jul 2020 15:25) (17 - 18)  SpO2: 96% (02 Jul 2020 15:25) (96% - 99%)    P/E:  Elderly female, comfortable at rest, dementia but followed simple commands  Neuro: AAO x2; No acute focal deficits; residual right upper/ lower extremity weakness 3/5 chronic  Left UE 5/5, LLE 4/5;   CVS: S1S2 present, regular  Resp: BLAE+, No wheeze or Rhonchi  GI: Soft, BS+, NT  Extr: No edema or calf tenderness    Labs:                        11.5   4.80  )-----------( 243      ( 02 Jul 2020 07:06 )             37.1   07-02    141  |  106  |  4<L>  ----------------------------<  103<H>  3.6   |  26  |  0.58    Ca    9.5      02 Jul 2020 07:06  Phos  3.0     07-02  Mg     1.6     07-02    TPro  7.1  /  Alb  3.7  /  TBili  1.1  /  DBili  x   /  AST  23  /  ALT  33  /  AlkPhos  58  07-01       D/D:  Abdominal pain and Vomiting likely Acute Gastroenteritis resolving  Elevated Troponin likely Demand ischemia with concern for NSTEMI, although less likely  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 Hand   Vascular Sx consult appreciated; recommended anticoagulation  Patient was placed on Heparin drip in ED; Due to patient underlying Dementia and need for frequent blood draws for PTT monitoring on heparin, will  prefer to switch to Lovenox therapeutic dose; d/w Vascular Sx; a/c with either Lovenox or heparin is okay.   D/C Heparin around 2.30 PM with plan to switch to Lovenox 1mg/kg q 12 hr from 9PM. d/w RN  Unable to reach family/ grandson through day; d/w RN ED and Floor to obtain contact information if family calls back  Needs to obtain medication list  Patient has no dysphagia to liquids; Advanced to Full liquid diet this afternoon; Advance to soft diet in AM if no further vomiting or abdominal pain overnight    Discussed with PGY2 CATHERINE Lundberg and RN [facility] ED Hold and Goldy RN Floor Patient seen and examined earlier this afternoon with PGY1; Agree with PGY1 A/P above with editing as needed. My independent assessment, findings on exam, diagnosis and plan of care as listed below. Discussed with Dr. De La Rosa.    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    Vomiting and abdominal pain has resolved since admission; Tolerated full liquid diet since yesterday. Diet was advanced this afternoon to soft diet which patient ate about 50% as per RN. Patient denied any chest pain or SOB.     Vital Signs Last 24 Hrs  T(C): 36.9 (02 Jul 2020 15:25), Max: 37 (02 Jul 2020 07:25)  T(F): 98.5 (02 Jul 2020 15:25), Max: 98.6 (02 Jul 2020 07:25)  HR: 83 (02 Jul 2020 15:25) (64 - 86)  BP: 152/77 (02 Jul 2020 15:25) (124/73 - 163/78)  RR: 18 (02 Jul 2020 15:25) (17 - 18)  SpO2: 96% (02 Jul 2020 15:25) (96% - 99%)    P/E:  Elderly female, comfortable at rest, dementia but followed simple commands  Neuro: AAO x2; No acute focal deficits; residual right upper/ lower extremity weakness 3/5 chronic  Left UE 5/5, LLE 4/5;   CVS: S1S2 present, regular  Resp: BLAE+, No wheeze or Rhonchi  GI: Soft, BS+, NT  Extr: No edema or calf tenderness    Labs:                        11.5   4.80  )-----------( 243      ( 02 Jul 2020 07:06 )             37.1   07-02    141  |  106  |  4<L>  ----------------------------<  103<H>  3.6   |  26  |  0.58    Ca    9.5      02 Jul 2020 07:06  Phos  3.0     07-02  Mg     1.6     07-02    TPro  7.1  /  Alb  3.7  /  TBili  1.1  /  DBili  x   /  AST  23  /  ALT  33  /  AlkPhos  58  07-01    D/D:  Abdominal pain and Vomiting likely Acute Gastroenteritis resolving  Elevated Troponin likely Demand ischemia with concern for NSTEMI, although less likely  Thrombosis of Left common and Internal iliac vein with suspected May Thurner syndrome  Dementia    Plan:  Telemetry; 2D echo  Cardio consult Dr. Hand   Vascular Sx consult appreciated; recommended anticoagulation  Will get Hemato/Oncology eval with Dr. Limon;   Get a Pelvic sono to assess for any malignancy, less likely and patient not a candidate for interventions.   Conitnue Lovenox; if okay with Hematology will switch to DOACs (Aixaban/ Xarelto) whicever is covered by her Insurance  patient was on Metoprolol and Digoxin at home raising suspicion for ?? PAF  Continue diet as tolerated     Discussed with PGY1 Dr. De La Rosa and PGY2 Dr. EDGARD Chandler and RN  D/C Plan next 24 to 48 hrs if remain stable Patient seen and examined earlier this afternoon with PGY1; Agree with PGY1 A/P above with editing as needed. My independent assessment, findings on exam, diagnosis and plan of care as listed below. Discussed with Dr. De La Rosa.    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    Vomiting and abdominal pain has resolved since admission; Tolerated full liquid diet since yesterday. Diet was advanced this afternoon to soft diet which patient ate about 50% as per RN. Patient denied any chest pain or SOB.     Vital Signs Last 24 Hrs  T(C): 36.9 (02 Jul 2020 15:25), Max: 37 (02 Jul 2020 07:25)  T(F): 98.5 (02 Jul 2020 15:25), Max: 98.6 (02 Jul 2020 07:25)  HR: 83 (02 Jul 2020 15:25) (64 - 86)  BP: 152/77 (02 Jul 2020 15:25) (124/73 - 163/78)  RR: 18 (02 Jul 2020 15:25) (17 - 18)  SpO2: 96% (02 Jul 2020 15:25) (96% - 99%)    P/E:  Elderly female, comfortable at rest, dementia but followed simple commands  Neuro: AAO x2; No acute focal deficits; residual right upper/ lower extremity weakness 3/5 chronic  Left UE 5/5, LLE 4/5;   CVS: S1S2 present, regular  Resp: BLAE+, No wheeze or Rhonchi  GI: Soft, BS+, NT  Extr: No edema or calf tenderness    Labs:                        11.5   4.80  )-----------( 243      ( 02 Jul 2020 07:06 )             37.1   07-02    141  |  106  |  4<L>  ----------------------------<  103<H>  3.6   |  26  |  0.58    Ca    9.5      02 Jul 2020 07:06  Phos  3.0     07-02  Mg     1.6     07-02    TPro  7.1  /  Alb  3.7  /  TBili  1.1  /  DBili  x   /  AST  23  /  ALT  33  /  AlkPhos  58  07-01    D/D:  Abdominal pain and Vomiting likely Acute Gastroenteritis resolving  Elevated Troponin likely Demand ischemia with concern for NSTEMI, although less likely  Thrombosis of Left common and Internal iliac vein with suspected May Thurner syndrome  Dementia    Plan:  Telemetry; Await 2D echo  Cardio consult Dr. Hand   Vascular Sx consult appreciated; recommended anticoagulation  Will get Hemato/Oncology eval with Dr. Limon;   Get a Pelvic sono to assess for any malignancy, less likely and patient not a candidate for interventions.   Continue Lovenox; if okay with Hematology will switch to DOACs (Apixaban/ Xarelto) whichever is covered by her Insurance  Patient was on Metoprolol and Digoxin at home raising suspicion for ?? PAF  Continue diet as tolerated     Discussed with PGY1 Dr. De La Rosa and PGY2 Dr. EDGARD Chandler and RN  D/C Plan next 24 to 48 hrs if remain stable

## 2020-07-02 NOTE — PROGRESS NOTE ADULT - ASSESSMENT
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 On my evaluation patient is poor historian, AOX1-2 (self, place), forgetful , does not remember grandson name or phone number. Patient c/o generalized abdominal pain, aching in nature, 7/10, radiation to back , associated with nausea. Multiple unsuccessful attempts were made to call grandson with no success. 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 On evaluation patient is poor historian, AOX2 (self, place), forgetful. Patient currently denies generalized abdominal pain or nausea. 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 On evaluation patient is poor historian, AOX2 (self, place), forgetful. Patient currently denies generalized abdominal pain or nausea. On admission, uptrending Trops and EKG changes was s/o NSTEMI. CT abdomen and pelvis demonstrated features of May Thurner syndrome for which AC has been started.

## 2020-07-02 NOTE — CONSULT NOTE ADULT - PROBLEM SELECTOR RECOMMENDATION 9
May-Thurner syndrome  she had covid infection recently  the clot can be from the pressure caused by common iliac artery but the hypercoagulability caused by COVID probably play a role  she can be on NOAC  vascular surgeon does not think surgical intervention is necessary  but I think putting in a stent should be considered.  otherwise she needs to be on AC for the rest of her life.  there is no need to do thrombophilia w/u

## 2020-07-02 NOTE — PROGRESS NOTE ADULT - SUBJECTIVE AND OBJECTIVE BOX
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 She is A0x2, verbal but slightly confused. She does not recall vomiting, or having abdominal pain. She denies chest pain, SOB, or headache currently. She has not passed stools today or had food. Grandson was contacted (Alexandr Jenkins: [phone]) and reported that she lives with her son at home, and has been bedridden for the past 3 months. He reported that she has been having vomiting, and diarrhea for the past 3-4 days, and confirmed home meds to include digoxin 0.125mg daily.      ED course: Partial thrombosis of the left common and internal iliac vein. Patient was placed on heparin drip full dose.    GOC : patient is full code    INTERVAL HPI/OVERNIGHT EVENTS: no events noted overnight.    MEDICATIONS  (STANDING):  aspirin  chewable 81 milliGRAM(s) Oral daily  atorvastatin 40 milliGRAM(s) Oral at bedtime  dextrose 5% + sodium chloride 0.45% with potassium chloride 20 mEq/L 1000 milliLiter(s) (75 mL/Hr) IV Continuous <Continuous>  enoxaparin Injectable 70 milliGRAM(s) SubCutaneous every 12 hours  metoprolol tartrate 25 milliGRAM(s) Oral two times a day  ondansetron Injectable 4 milliGRAM(s) IV Push once    MEDICATIONS  (PRN):      __________________________________________________  REVIEW OF SYSTEMS:  CONSTITUTIONAL: No fever   EYES: no acute visual disturbances  NECK: No pain or stiffness  RESPIRATORY: No cough; No shortness of breath  CARDIOVASCULAR: No chest pain, no palpitations  GASTROINTESTINAL: No pain. No nausea or vomiting; No diarrhea   NEUROLOGICAL: No headache or numbness, no tremors  MUSCULOSKELETAL: No joint pain, no muscle pain  GENITOURINARY: no dysuria, no frequency, no hesitancy  PSYCHIATRY: no depression , no anxiety  ALL OTHER ROS negative        Vital Signs Last 24 Hrs  T(C): 37 (02 Jul 2020 07:25), Max: 37 (02 Jul 2020 07:25)  T(F): 98.6 (02 Jul 2020 07:25), Max: 98.6 (02 Jul 2020 07:25)  HR: 64 (02 Jul 2020 07:25) (64 - 101)  BP: 124/74 (02 Jul 2020 07:25) (124/73 - 170/75)  BP(mean): --  RR: 18 (02 Jul 2020 07:25) (16 - 19)  SpO2: 99% (02 Jul 2020 07:25) (97% - 99%)    ________________________________________________  PHYSICAL EXAM:  GENERAL: NAD  HEENT: Normocephalic;  conjunctivae and sclerae clear; moist mucous membranes;   NECK : supple  CHEST/LUNG: Clear to auscultation bilaterally with good air entry   HEART: S1 S2  regular; no murmurs, gallops or rubs  ABDOMEN: Soft, Nontender, Nondistended; Bowel sounds present  EXTREMITIES: no cyanosis; no edema; no calf tenderness  SKIN: warm and dry; no rash  NERVOUS SYSTEM:  Awake and alert; Oriented  to place, person and time ; no new deficits    _________________________________________________  LABS:                        11.5   4.80  )-----------( 243      ( 02 Jul 2020 07:06 )             37.1     07-02    141  |  106  |  4<L>  ----------------------------<  103<H>  3.6   |  26  |  0.58    Ca    9.5      02 Jul 2020 07:06  Phos  3.0     07-02  Mg     1.6     07-02    TPro  7.1  /  Alb  3.7  /  TBili  1.1  /  DBili  x   /  AST  23  /  ALT  33  /  AlkPhos  58  07-01    PT/INR - ( 01 Jul 2020 09:09 )   PT: 12.7 sec;   INR: 1.09 ratio         PTT - ( 01 Jul 2020 09:09 )  PTT:28.4 sec    CAPILLARY BLOOD GLUCOSE            RADIOLOGY & ADDITIONAL TESTS:  < from: CT Abdomen and Pelvis w/ IV Cont (07.01.20 @ 06:18) >  IMPRESSION:    1.  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 Critical results discussed with Dr. Sharma  at 6:40 AM.  2.  Benign-appearing left ovarian cyst measures 2.7 cm. Sonographic correlation is advised given the size and patient's postmenopausal state.        < end of copied text >        Imaging Personally Reviewed:  YES    Consultant(s) Notes Reviewed:   YES    Care Discussed with Consultants : YES     Plan of care was discussed with patient and /or primary care giver; all questions and concerns were addressed and care was aligned with patient's wishes.    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 She is A0x2, verbal but slightly confused. She does not recall vomiting, or having abdominal pain. She denies chest pain, SOB, or headache currently. She has not passed stools today or had food. Grandson was contacted (Alexandr Jenkins: [phone]) and reported that she lives with her son at home, and has been bedridden for the past 3 months. He reported that she has been having vomiting, and diarrhea for the past 3-4 days, and confirmed home meds to include digoxin 0.125mg daily.      ED course: Partial thrombosis of the left common and internal iliac vein. Patient was placed on heparin drip full dose.    GOC : patient is full code    INTERVAL HPI/OVERNIGHT EVENTS: no events noted overnight.    MEDICATIONS  (STANDING):  aspirin  chewable 81 milliGRAM(s) Oral daily  atorvastatin 40 milliGRAM(s) Oral at bedtime  dextrose 5% + sodium chloride 0.45% with potassium chloride 20 mEq/L 1000 milliLiter(s) (75 mL/Hr) IV Continuous <Continuous>  enoxaparin Injectable 70 milliGRAM(s) SubCutaneous every 12 hours  metoprolol tartrate 25 milliGRAM(s) Oral two times a day  ondansetron Injectable 4 milliGRAM(s) IV Push once    MEDICATIONS  (PRN):      __________________________________________________  REVIEW OF SYSTEMS:  CONSTITUTIONAL: No fever   EYES: no acute visual disturbances  NECK: No pain or stiffness  RESPIRATORY: No cough; No shortness of breath  CARDIOVASCULAR: No chest pain, no palpitations  GASTROINTESTINAL: No pain. No nausea or vomiting; No diarrhea   NEUROLOGICAL: No headache or numbness, no tremors  MUSCULOSKELETAL: No joint pain, no muscle pain  GENITOURINARY: no dysuria, no frequency, no hesitancy  PSYCHIATRY: no depression , no anxiety  ALL OTHER ROS negative        Vital Signs Last 24 Hrs  T(C): 37 (02 Jul 2020 07:25), Max: 37 (02 Jul 2020 07:25)  T(F): 98.6 (02 Jul 2020 07:25), Max: 98.6 (02 Jul 2020 07:25)  HR: 64 (02 Jul 2020 07:25) (64 - 101)  BP: 124/74 (02 Jul 2020 07:25) (124/73 - 170/75)  BP(mean): --  RR: 18 (02 Jul 2020 07:25) (16 - 19)  SpO2: 99% (02 Jul 2020 07:25) (97% - 99%)    ________________________________________________  PHYSICAL EXAM:  GENERAL: NAD  HEENT: Normocephalic;  conjunctivae and sclerae clear; moist mucous membranes;   NECK : supple  CHEST/LUNG: Clear to auscultation bilaterally with good air entry   HEART: S1 S2  regular; no murmurs, gallops or rubs  ABDOMEN: Soft, Nontender, Nondistended; Bowel sounds present  EXTREMITIES: no cyanosis; no edema; no calf tenderness  SKIN: warm and dry; no rash  NERVOUS SYSTEM:  Awake and alert; Oriented  to place, person and time ; no new deficits    _________________________________________________  LABS:                        11.5   4.80  )-----------( 243      ( 02 Jul 2020 07:06 )             37.1     07-02    141  |  106  |  4<L>  ----------------------------<  103<H>  3.6   |  26  |  0.58    Ca    9.5      02 Jul 2020 07:06  Phos  3.0     07-02  Mg     1.6     07-02    TPro  7.1  /  Alb  3.7  /  TBili  1.1  /  DBili  x   /  AST  23  /  ALT  33  /  AlkPhos  58  07-01    PT/INR - ( 01 Jul 2020 09:09 )   PT: 12.7 sec;   INR: 1.09 ratio         PTT - ( 01 Jul 2020 09:09 )  PTT:28.4 sec    CAPILLARY BLOOD GLUCOSE            RADIOLOGY & ADDITIONAL TESTS:  < from: CT Abdomen and Pelvis w/ IV Cont (07.01.20 @ 06:18) >  IMPRESSION:    1.  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 Critical results discussed with Dr. Sharma  at 6:40 AM.  2.  Benign-appearing left ovarian cyst measures 2.7 cm. Sonographic correlation is advised given the size and patient's postmenopausal state.    < end of copied text >    < from: US Duplex Venous Lower Ext Complete, Bilateral (07.01.20 @ 14:14) >  IMPRESSION:   No evidence of deep venous thrombosis in either lower extremity.    < end of copied text >    Imaging Personally Reviewed:  YES    Consultant(s) Notes Reviewed:   YES    Care Discussed with Consultants : YES     Plan of care was discussed with patient and /or primary care giver; all questions and concerns were addressed and care was aligned with patient's wishes.    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 She is A0x2, verbal but slightly confused. She does not recall vomiting, or having abdominal pain. She denies chest pain, SOB, or headache currently. She has not passed stools today or had food. Grandson was contacted (Alexandr Jenkins: [phone]) and reported that she lives with her son at home, and has been bedridden for the past 3 months. He reported that she has been having vomiting, and diarrhea for the past 3-4 days, and confirmed home meds to include digoxin 0.125mg daily.      ED course: Partial thrombosis of the left common and internal iliac vein. Patient was placed on heparin drip full dose.    GOC : patient is full code    INTERVAL HPI/OVERNIGHT EVENTS: no events noted overnight.    MEDICATIONS  (STANDING):  aspirin  chewable 81 milliGRAM(s) Oral daily  atorvastatin 40 milliGRAM(s) Oral at bedtime  dextrose 5% + sodium chloride 0.45% with potassium chloride 20 mEq/L 1000 milliLiter(s) (75 mL/Hr) IV Continuous <Continuous>  enoxaparin Injectable 70 milliGRAM(s) SubCutaneous every 12 hours  metoprolol tartrate 25 milliGRAM(s) Oral two times a day  ondansetron Injectable 4 milliGRAM(s) IV Push once    MEDICATIONS  (PRN):      __________________________________________________  REVIEW OF SYSTEMS:  CONSTITUTIONAL: No fever   EYES: no acute visual disturbances  NECK: No pain or stiffness  RESPIRATORY: No cough; No shortness of breath  CARDIOVASCULAR: No chest pain, no palpitations  GASTROINTESTINAL: No pain. No nausea or vomiting; No diarrhea   NEUROLOGICAL: No headache or numbness, no tremors  MUSCULOSKELETAL: No joint pain, no muscle pain  GENITOURINARY: no dysuria, no frequency, no hesitancy  PSYCHIATRY: no depression , no anxiety  ALL OTHER ROS negative        Vital Signs Last 24 Hrs  T(C): 37 (02 Jul 2020 07:25), Max: 37 (02 Jul 2020 07:25)  T(F): 98.6 (02 Jul 2020 07:25), Max: 98.6 (02 Jul 2020 07:25)  HR: 64 (02 Jul 2020 07:25) (64 - 101)  BP: 124/74 (02 Jul 2020 07:25) (124/73 - 170/75)  BP(mean): --  RR: 18 (02 Jul 2020 07:25) (16 - 19)  SpO2: 99% (02 Jul 2020 07:25) (97% - 99%)    ________________________________________________  PHYSICAL EXAM:  GENERAL: NAD  HEENT: Normocephalic;  conjunctivae and sclerae clear; moist mucous membranes;   NECK : supple  CHEST/LUNG: Clear to auscultation bilaterally with good air entry   HEART: S1 S2  regular; no murmurs, gallops or rubs  ABDOMEN: Soft, Nontender, Nondistended; Bowel sounds present  EXTREMITIES: no cyanosis; no edema; no calf tenderness.   SKIN: warm and dry; no rash  NERVOUS SYSTEM:  Awake and alert; Oriented  to place, person and time ; no new deficits  Tone: Left UL, LL are normal tone, Right UL, LL are hypertonic and contracted  Power:  Left UL, LL 4/5, R UL, LL 2/5    _________________________________________________  LABS:                        11.5   4.80  )-----------( 243      ( 02 Jul 2020 07:06 )             37.1     07-02    141  |  106  |  4<L>  ----------------------------<  103<H>  3.6   |  26  |  0.58    Ca    9.5      02 Jul 2020 07:06  Phos  3.0     07-02  Mg     1.6     07-02    TPro  7.1  /  Alb  3.7  /  TBili  1.1  /  DBili  x   /  AST  23  /  ALT  33  /  AlkPhos  58  07-01    PT/INR - ( 01 Jul 2020 09:09 )   PT: 12.7 sec;   INR: 1.09 ratio         PTT - ( 01 Jul 2020 09:09 )  PTT:28.4 sec    CAPILLARY BLOOD GLUCOSE            RADIOLOGY & ADDITIONAL TESTS:  < from: CT Abdomen and Pelvis w/ IV Cont (07.01.20 @ 06:18) >  IMPRESSION:    1.  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 Critical results discussed with Dr. Shamra  at 6:40 AM.  2.  Benign-appearing left ovarian cyst measures 2.7 cm. Sonographic correlation is advised given the size and patient's postmenopausal state.    < end of copied text >    < from: US Duplex Venous Lower Ext Complete, Bilateral (07.01.20 @ 14:14) >  IMPRESSION:   No evidence of deep venous thrombosis in either lower extremity.    < end of copied text >    Imaging Personally Reviewed:  YES    Consultant(s) Notes Reviewed:   YES    Care Discussed with Consultants : YES     Plan of care was discussed with patient and /or primary care giver; all questions and concerns were addressed and care was aligned with patient's wishes.    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 She is A0x2, verbal but slightly confused. She does not recall vomiting, or having abdominal pain. She denies chest pain, SOB, or headache currently. She has not passed stools today or had food. Grandson was contacted (Alexandr Jenkins: [phone]) and reported that she lives with her son at home, and has been bedridden for the past 3 months. He reported that she has been having vomiting, and diarrhea for the past 3-4 days, and confirmed home meds to include digoxin 0.125mg daily.      ED course: Partial thrombosis of the left common and internal iliac vein. Patient was placed on heparin drip full dose.    GOC : patient is full code    INTERVAL HPI/OVERNIGHT EVENTS: no events noted overnight.    MEDICATIONS  (STANDING):  aspirin  chewable 81 milliGRAM(s) Oral daily  atorvastatin 40 milliGRAM(s) Oral at bedtime  dextrose 5% + sodium chloride 0.45% with potassium chloride 20 mEq/L 1000 milliLiter(s) (75 mL/Hr) IV Continuous <Continuous>  enoxaparin Injectable 70 milliGRAM(s) SubCutaneous every 12 hours  metoprolol tartrate 25 milliGRAM(s) Oral two times a day  ondansetron Injectable 4 milliGRAM(s) IV Push once    MEDICATIONS  (PRN):      __________________________________________________  REVIEW OF SYSTEMS:  CONSTITUTIONAL: No fever   EYES: no acute visual disturbances  NECK: No pain or stiffness  RESPIRATORY: No cough; No shortness of breath  CARDIOVASCULAR: No chest pain, no palpitations  GASTROINTESTINAL: No pain. No nausea or vomiting; No diarrhea   NEUROLOGICAL: No headache or numbness, no tremors  MUSCULOSKELETAL: No joint pain, no muscle pain  GENITOURINARY: no dysuria, no frequency, no hesitancy  PSYCHIATRY: no depression , no anxiety  ALL OTHER ROS negative        Vital Signs Last 24 Hrs  T(C): 37 (02 Jul 2020 07:25), Max: 37 (02 Jul 2020 07:25)  T(F): 98.6 (02 Jul 2020 07:25), Max: 98.6 (02 Jul 2020 07:25)  HR: 64 (02 Jul 2020 07:25) (64 - 101)  BP: 124/74 (02 Jul 2020 07:25) (124/73 - 170/75)  RR: 18 (02 Jul 2020 07:25) (16 - 19)  SpO2: 99% (02 Jul 2020 07:25) (97% - 99%)    ________________________________________________  PHYSICAL EXAM:  GENERAL: NAD  HEENT: Normocephalic;  conjunctivae and sclerae clear; moist mucous membranes;   NECK : supple  CHEST/LUNG: Clear to auscultation bilaterally with good air entry   HEART: S1 S2  regular; no murmurs, gallops or rubs  ABDOMEN: Soft, Nontender, Nondistended; Bowel sounds present  EXTREMITIES: no cyanosis; no edema; no calf tenderness.   SKIN: warm and dry; no rash  NERVOUS SYSTEM:  Awake and alert; Oriented  to place, person and time ; no new deficits  Tone: Left UL, LL are normal tone, Right UL, LL are hypertonic and contracted  Power:  Left UL, LL 4/5, R UL, LL 2/5    _________________________________________________  LABS:                        11.5   4.80  )-----------( 243      ( 02 Jul 2020 07:06 )             37.1     07-02    141  |  106  |  4<L>  ----------------------------<  103<H>  3.6   |  26  |  0.58    Ca    9.5      02 Jul 2020 07:06  Phos  3.0     07-02  Mg     1.6     07-02    TPro  7.1  /  Alb  3.7  /  TBili  1.1  /  DBili  x   /  AST  23  /  ALT  33  /  AlkPhos  58  07-01    PT/INR - ( 01 Jul 2020 09:09 )   PT: 12.7 sec;   INR: 1.09 ratio    PTT - ( 01 Jul 2020 09:09 )  PTT:28.4 sec      RADIOLOGY & ADDITIONAL TESTS:  < from: CT Abdomen and Pelvis w/ IV Cont (07.01.20 @ 06:18) >  IMPRESSION:    1.  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 Critical results discussed with Dr. Sharma  at 6:40 AM.  2.  Benign-appearing left ovarian cyst measures 2.7 cm. Sonographic correlation is advised given the size and patient's postmenopausal state.        < from: US Duplex Venous Lower Ext Complete, Bilateral (07.01.20 @ 14:14)     IMPRESSION:   No evidence of deep venous thrombosis in either lower extremity.        Imaging Personally Reviewed:  YES    Consultant(s) Notes Reviewed:   YES    Care Discussed with Consultants : YES     Plan of care was discussed with patient and /or primary care giver; all questions and concerns were addressed and care was aligned with patient's wishes.    Estrella Mullins  [phone]

## 2020-07-02 NOTE — PROGRESS NOTE ADULT - PROBLEM SELECTOR PLAN 1
Patient presented with episode of gastric pain and vomiting which resolved while admitted on ED   Abdominal CT scan yielded partial thrombosis of the left common and internal iliac vein. Patient was placed on heparin drip full dose which switched to full dose Lovenox later in afternoon  - vascular consult appreciated  -Doppler negative for LE DVTs  - cw full dose Lovenox  - patient on clear liquid  - advance diet to soft AM Patient presented with episode of gastric pain and vomiting which resolved while admitted on ED   Abdominal CT scan yielded partial thrombosis of the left common and internal iliac vein. Patient is on therapeutic Lovenox to reduce the need for needlesticks.  - vascular consult is appreciated, and they recommended AC and OP follow up in 2 months  -Doppler negative for LE DVTs  - diet has been advanced from liquids to soft diet

## 2020-07-02 NOTE — PROGRESS NOTE ADULT - PROBLEM SELECTOR PLAN 4
AAOX1-2  - f/u ERIN for tracing family   - safe discharge AAOX1-2  Family contacted    Management / S W input for safe discharge

## 2020-07-02 NOTE — PROGRESS NOTE ADULT - PROBLEM SELECTOR PLAN 3
- Patient with residual left sided paresi and left facial droop  - telemetry  - Primary team to f/u with family for medication and PMH - Patient with residual left sided paresi and left facial droop  - Patient will - Patient with residual left sided paresi and left facial droop  - Patient's deficits are status quo   - PT eval

## 2020-07-02 NOTE — PROGRESS NOTE ADULT - PROBLEM SELECTOR PLAN 2
patient presented with elevated troponin , EKG NSR with nonspecific ST-T segment changes  Patient was on full dose heparin for new DVt switched to full dose lovenox  - trend troponin   -c/w full dose Lovenox  - f/u Echo  - telemetry  ****cardiology Dr Hand Patient presented with elevated troponin , EKG NSR with nonspecific ST-T segment changes s/o NSTEMI   - troponin is trending up: 0.129; 0.136; 0.171   -c/w full dose Lovenox  - Awaiting Echo in view of her probable reduced EF (home meds include digoxin)  - telemetry  ****cardiology Dr Hand

## 2020-07-02 NOTE — CONSULT NOTE ADULT - SUBJECTIVE AND OBJECTIVE BOX
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 On my evaluation patient is poor historian, AOX1-2 (self, place), forgetful , does not remember grandson name or phone number. Patient c/o generalized abdominal pain, aching in nature, 7/10, radiation to back , associated with nausea. Multiple unsuccessful attempts were made to call grandson with no success.      ED course: Partial thrombosis of the left common and internal iliac vein. Patient was placed on heparin drip full dose.    GOC : patient will remain full code for now,  consulted to contact [facility]. (01 Jul 2020 10:12)       ROS:  Negative except for:    PAST MEDICAL & SURGICAL HISTORY:      SOCIAL HISTORY:    FAMILY HISTORY:      MEDICATIONS  (STANDING):  aspirin  chewable 81 milliGRAM(s) Oral daily  atorvastatin 40 milliGRAM(s) Oral at bedtime  dextrose 5% + sodium chloride 0.45% with potassium chloride 20 mEq/L 1000 milliLiter(s) (75 mL/Hr) IV Continuous <Continuous>  digoxin     Tablet 0.125 milliGRAM(s) Oral daily  enoxaparin Injectable 70 milliGRAM(s) SubCutaneous every 12 hours  metoprolol tartrate 25 milliGRAM(s) Oral two times a day  ondansetron Injectable 4 milliGRAM(s) IV Push once  pantoprazole    Tablet 40 milliGRAM(s) Oral before breakfast    MEDICATIONS  (PRN):  acetaminophen   Tablet .. 650 milliGRAM(s) Oral every 4 hours PRN Mild Pain (1 - 3)      Allergies    No Known Allergies    Intolerances        Vital Signs Last 24 Hrs  T(C): 37 (02 Jul 2020 19:51), Max: 37 (02 Jul 2020 07:25)  T(F): 98.6 (02 Jul 2020 19:51), Max: 98.6 (02 Jul 2020 07:25)  HR: 66 (02 Jul 2020 19:51) (64 - 86)  BP: 121/63 (02 Jul 2020 19:51) (121/63 - 163/78)  BP(mean): --  RR: 18 (02 Jul 2020 19:51) (18 - 18)  SpO2: 97% (02 Jul 2020 19:51) (96% - 99%)    PHYSICAL EXAM  General: adult in NAD  HEENT: clear oropharynx, anicteric sclera, pink conjunctiva  Neck: supple  CV: normal S1/S2 with no murmur rubs or gallops  Lungs: positive air movement b/l ant lungs,clear to auscultation, no wheezes, no rales  Abdomen: soft non-tender non-distended, no hepatosplenomegaly  Ext: no clubbing cyanosis or edema  Skin: no rashes and no petechiae  Neuro: alert and oriented X 4, no focal deficits      LABS:                          11.5   4.80  )-----------( 243      ( 02 Jul 2020 07:06 )             37.1         Mean Cell Volume : 89.0 fl  Mean Cell Hemoglobin : 27.6 pg  Mean Cell Hemoglobin Concentration : 31.0 gm/dL  Auto Neutrophil # : x  Auto Lymphocyte # : x  Auto Monocyte # : x  Auto Eosinophil # : x  Auto Basophil # : x  Auto Neutrophil % : x  Auto Lymphocyte % : x  Auto Monocyte % : x  Auto Eosinophil % : x  Auto Basophil % : x      Serial CBC's  07-02 @ 07:06  Hct-37.1 / Hgb-11.5 / Plat-243 / RBC-4.17 / WBC-4.80  Serial CBC's  07-01 @ 04:57  Hct-41.2 / Hgb-13.1 / Plat-228 / RBC-4.70 / WBC-3.97      07-02    141  |  106  |  4<L>  ----------------------------<  103<H>  3.6   |  26  |  0.58    Ca    9.5      02 Jul 2020 07:06  Phos  3.0     07-02  Mg     1.6     07-02    TPro  7.1  /  Alb  3.7  /  TBili  1.1  /  DBili  x   /  AST  23  /  ALT  33  /  AlkPhos  58  07-01      PT/INR - ( 01 Jul 2020 09:09 )   PT: 12.7 sec;   INR: 1.09 ratio         PTT - ( 01 Jul 2020 09:09 )  PTT:28.4 sec                BLOOD SMEAR INTERPRETATION:       RADIOLOGY & ADDITIONAL STUDIES:    < from: CT Abdomen and Pelvis w/ IV Cont (07.01.20 @ 06:18) >  DATE OF EXAM: 7/1/2020 6:18 AM    HISTORY: Abdominal pain and vomiting    COMPARISON: None.    TECHNIQUE: CT examination of the abdomen and pelvis was performed following the intravenous administration of 90 mL Omnipaque 350. 10 mL Omnipaque 350  discarded.  CT dose lowering techniques were used, to include: automated exposure control, adjustment for patient size, and/or use of iterative reconstruction.    FINDINGS:    ABDOMEN/PELVIS:    Lower Chest: Dependent change.    Liver: Within normal limits    Gallbladder/Billary: Within normal limits    Pancreas:Fatty atrophy of the pancreatic head.    Spleen: Within normal limits    Adrenal Glands: Normal.     Kidneys: Bilateral renal cysts. Symmetric enhancement without hydronephrosis.    GI Tract: Diverticulosis. No bowel obstruction. Appendix not identified, no secondary findings of acute appendicitis. Metallic clips in the gastric antrum, could be related to pass biopsy      < end of copied text >  < from: CT Abdomen and Pelvis w/ IV Cont (07.01.20 @ 06:18) >  in the gastric antrum, could be related to pass biopsy    Mesentery/Peritoneum: No free air or free fluid    Vasculature: Partial thrombosis of the left common and internal iliac vein distal to an area of extrinsic compression upon the left common iliac vein as it crosses beneath the left common femoral artery. Atherosclerotic change of the abdominal aorta and its branches including the mesenteric arteries.    Lymph Nodes: No enlarged lymph node measuring greater than 10 mm in short axis    Abdominal Wall: Intact    Bladder: Partially distended and unremarkable    Reproductive: Left ovarian simple appearing cyst measures 3.7 cm. Atrophic uterus.    Musculoskeletal: Osteopenia. Degenerative changes in the lower lumbar spine. Heterotopic ossification along the right hip joint capsule.      IMPRESSION:    < end of copied text >  < from: CT Abdomen and Pelvis w/ IV Cont (07.01.20 @ 06:18) >  1.  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 Critical results discussed with Dr. Sharma  at 6:40 AM.  2.  Benign-appearing left ovarian cyst measures 2.7 cm. Sonographic correlation is advised given the size and patient's postmenopausal state.    < end of copied text >

## 2020-07-03 DIAGNOSIS — K59.00 CONSTIPATION, UNSPECIFIED: ICD-10-CM

## 2020-07-03 LAB
24R-OH-CALCIDIOL SERPL-MCNC: 15.9 NG/ML — LOW (ref 30–80)
CANCER AG125 SERPL-ACNC: 7 U/ML — SIGNIFICANT CHANGE UP
CANCER AG125 SERPL-ACNC: 7 U/ML — SIGNIFICANT CHANGE UP
FOLATE SERPL-MCNC: 3.4 NG/ML — LOW
HCT VFR BLD CALC: 37.6 % — SIGNIFICANT CHANGE UP (ref 34.5–45)
HGB BLD-MCNC: 11.5 G/DL — SIGNIFICANT CHANGE UP (ref 11.5–15.5)
MCHC RBC-ENTMCNC: 27.3 PG — SIGNIFICANT CHANGE UP (ref 27–34)
MCHC RBC-ENTMCNC: 30.6 GM/DL — LOW (ref 32–36)
MCV RBC AUTO: 89.3 FL — SIGNIFICANT CHANGE UP (ref 80–100)
NRBC # BLD: 0 /100 WBCS — SIGNIFICANT CHANGE UP (ref 0–0)
PLATELET # BLD AUTO: 228 K/UL — SIGNIFICANT CHANGE UP (ref 150–400)
RBC # BLD: 4.21 M/UL — SIGNIFICANT CHANGE UP (ref 3.8–5.2)
RBC # FLD: 14.3 % — SIGNIFICANT CHANGE UP (ref 10.3–14.5)
VIT B12 SERPL-MCNC: 355 PG/ML — SIGNIFICANT CHANGE UP (ref 232–1245)
WBC # BLD: 3.63 K/UL — LOW (ref 3.8–10.5)
WBC # FLD AUTO: 3.63 K/UL — LOW (ref 3.8–10.5)

## 2020-07-03 PROCEDURE — 99233 SBSQ HOSP IP/OBS HIGH 50: CPT | Mod: GC

## 2020-07-03 RX ORDER — POLYETHYLENE GLYCOL 3350 17 G/17G
17 POWDER, FOR SOLUTION ORAL DAILY
Refills: 0 | Status: COMPLETED | OUTPATIENT
Start: 2020-07-03 | End: 2020-07-05

## 2020-07-03 RX ORDER — ERGOCALCIFEROL 1.25 MG/1
50000 CAPSULE ORAL
Refills: 0 | Status: DISCONTINUED | OUTPATIENT
Start: 2020-07-03 | End: 2020-07-09

## 2020-07-03 RX ORDER — SENNA PLUS 8.6 MG/1
2 TABLET ORAL AT BEDTIME
Refills: 0 | Status: COMPLETED | OUTPATIENT
Start: 2020-07-03 | End: 2020-07-05

## 2020-07-03 RX ORDER — LANOLIN ALCOHOL/MO/W.PET/CERES
3 CREAM (GRAM) TOPICAL AT BEDTIME
Refills: 0 | Status: DISCONTINUED | OUTPATIENT
Start: 2020-07-03 | End: 2020-07-09

## 2020-07-03 RX ORDER — APIXABAN 2.5 MG/1
10 TABLET, FILM COATED ORAL EVERY 12 HOURS
Refills: 0 | Status: DISCONTINUED | OUTPATIENT
Start: 2020-07-03 | End: 2020-07-09

## 2020-07-03 RX ORDER — FOLIC ACID 0.8 MG
1 TABLET ORAL
Refills: 0 | Status: DISCONTINUED | OUTPATIENT
Start: 2020-07-03 | End: 2020-07-09

## 2020-07-03 RX ADMIN — Medication 25 MILLIGRAM(S): at 05:37

## 2020-07-03 RX ADMIN — Medication 0.12 MILLIGRAM(S): at 05:36

## 2020-07-03 RX ADMIN — ERGOCALCIFEROL 50000 UNIT(S): 1.25 CAPSULE ORAL at 18:21

## 2020-07-03 RX ADMIN — Medication 3 MILLIGRAM(S): at 01:03

## 2020-07-03 RX ADMIN — Medication 1 MILLIGRAM(S): at 18:19

## 2020-07-03 RX ADMIN — SENNA PLUS 2 TABLET(S): 8.6 TABLET ORAL at 21:29

## 2020-07-03 RX ADMIN — APIXABAN 10 MILLIGRAM(S): 2.5 TABLET, FILM COATED ORAL at 17:47

## 2020-07-03 RX ADMIN — Medication 3 MILLIGRAM(S): at 21:29

## 2020-07-03 RX ADMIN — ENOXAPARIN SODIUM 70 MILLIGRAM(S): 100 INJECTION SUBCUTANEOUS at 05:36

## 2020-07-03 RX ADMIN — ATORVASTATIN CALCIUM 40 MILLIGRAM(S): 80 TABLET, FILM COATED ORAL at 21:29

## 2020-07-03 RX ADMIN — Medication 81 MILLIGRAM(S): at 11:12

## 2020-07-03 RX ADMIN — PANTOPRAZOLE SODIUM 40 MILLIGRAM(S): 20 TABLET, DELAYED RELEASE ORAL at 06:02

## 2020-07-03 RX ADMIN — Medication 25 MILLIGRAM(S): at 17:47

## 2020-07-03 RX ADMIN — POLYETHYLENE GLYCOL 3350 17 GRAM(S): 17 POWDER, FOR SOLUTION ORAL at 11:12

## 2020-07-03 NOTE — PROGRESS NOTE ADULT - PROBLEM SELECTOR PLAN 2
Patient presented with elevated troponin , EKG NSR with nonspecific ST-T segment changes s/o NSTEMI   - troponin is trending up: 0.129; 0.136; 0.171   -c/w full dose Lovenox  - Awaiting Echo in view of her probable reduced EF (home meds include digoxin)  - telemetry  ****cardiology Dr Hand

## 2020-07-03 NOTE — PROGRESS NOTE ADULT - ASSESSMENT
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 On evaluation patient is poor historian, AOX2 (self, place), forgetful. Patient currently denies generalized abdominal pain or nausea. On admission, uptrending Trops and EKG changes was s/o NSTEMI. CT abdomen and pelvis demonstrated features of May Thurner syndrome for which AC has been started.

## 2020-07-03 NOTE — PROGRESS NOTE ADULT - PROBLEM SELECTOR PLAN 3
- Patient with residual left sided paresi and left facial droop  - Patient's deficits are status quo   - PT eval

## 2020-07-03 NOTE — PROGRESS NOTE ADULT - SUBJECTIVE AND OBJECTIVE BOX
PGY 1 Note discussed with supervising resident and primary attending    Patient is a 74y old  Female with PMHx of old CVA, and unspecified heart failure presented with chief complaint of Abdominal Pain and Vomiting. Patient is A0x3, pleasant, and reports sleeping well, but was unable to complete her breakfast due to upper abdominal pain. She reports not passing any bowel movements for the past 3-4 days, but denies  any nausea or vomiting. She denies any chest pain, SOB, cough, or swelling of her legs.       INTERVAL HPI/OVERNIGHT EVENTS: no events noted overnight.    MEDICATIONS  (STANDING):  aspirin  chewable 81 milliGRAM(s) Oral daily  atorvastatin 40 milliGRAM(s) Oral at bedtime  dextrose 5% + sodium chloride 0.45% with potassium chloride 20 mEq/L 1000 milliLiter(s) (75 mL/Hr) IV Continuous <Continuous>  digoxin     Tablet 0.125 milliGRAM(s) Oral daily  enoxaparin Injectable 70 milliGRAM(s) SubCutaneous every 12 hours  melatonin 3 milliGRAM(s) Oral at bedtime  metoprolol tartrate 25 milliGRAM(s) Oral two times a day  ondansetron Injectable 4 milliGRAM(s) IV Push once  pantoprazole    Tablet 40 milliGRAM(s) Oral before breakfast  polyethylene glycol 3350 17 Gram(s) Oral daily  senna 2 Tablet(s) Oral at bedtime    MEDICATIONS  (PRN):  acetaminophen   Tablet .. 650 milliGRAM(s) Oral every 4 hours PRN Mild Pain (1 - 3)      __________________________________________________  REVIEW OF SYSTEMS:  CONSTITUTIONAL: No fever   EYES: no acute visual disturbances  NECK: No pain or stiffness  RESPIRATORY: No cough; No shortness of breath  CARDIOVASCULAR: No chest pain, no palpitations  GASTROINTESTINAL: Constipation++ No nausea or vomiting; No diarrhea   NEUROLOGICAL: No headache or numbness, no tremors, Right sided deficits present from old CVA   MUSCULOSKELETAL: No joint pain, no muscle pain  GENITOURINARY: no dysuria, no frequency, no hesitancy  PSYCHIATRY: no depression , no anxiety  ALL OTHER ROS negative        Vital Signs Last 24 Hrs  T(C): 36.9 (03 Jul 2020 07:46), Max: 37 (02 Jul 2020 19:51)  T(F): 98.4 (03 Jul 2020 07:46), Max: 98.6 (02 Jul 2020 19:51)  HR: 74 (03 Jul 2020 07:46) (66 - 86)  BP: 145/79 (03 Jul 2020 07:46) (121/63 - 152/88)  BP(mean): --  RR: 18 (03 Jul 2020 07:46) (18 - 19)  SpO2: 97% (03 Jul 2020 07:46) (96% - 100%)    ________________________________________________  PHYSICAL EXAM:  GENERAL: NAD  HEENT: Normocephalic;  conjunctivae and sclerae clear; moist mucous membranes;   NECK : supple  CHEST/LUNG: Clear to auscultation bilaterally with good air entry   HEART: S1 S2  regular; no murmurs, gallops or rubs  ABDOMEN: Soft,  Nondistended; Bowel sounds present. Discomfort 4/10 on palpation, no rebound tenderness or point tenderness.   EXTREMITIES: no cyanosis; no edema; no calf tenderness  SKIN: warm and dry; no rash  NERVOUS SYSTEM:  Awake and alert; Oriented  to place, person and time ; no new deficits    _________________________________________________  LABS:                        11.5   3.63  )-----------( 228      ( 03 Jul 2020 06:24 )             37.6     07-02    141  |  106  |  4<L>  ----------------------------<  103<H>  3.6   |  26  |  0.58    Ca    9.5      02 Jul 2020 07:06  Phos  3.0     07-02  Mg     1.6     07-02          CAPILLARY BLOOD GLUCOSE            RADIOLOGY & ADDITIONAL TESTS:    Imaging Personally Reviewed:  YES    Consultant(s) Notes Reviewed:   YES    Care Discussed with Consultants : YES     Plan of care was discussed with patient and /or primary care giver; all questions and concerns were addressed and care was aligned with patient's wishes.    Estrella Mullins  [phone] PGY 1 Note discussed with supervising resident and primary attending    Spoke via  ID [id number]    Patient is a 74y old  Female with PMHx of old CVA, and unspecified heart failure presented with chief complaint of Abdominal Pain and Vomiting. Patient is A0x3, pleasant, and reports sleeping well, but was unable to complete her breakfast due to upper abdominal pain. She reports not passing any bowel movements for the past 3-4 days, but denies  any nausea or vomiting. She denies any chest pain, SOB, cough, or swelling of her legs.       INTERVAL HPI/OVERNIGHT EVENTS: no events noted overnight.    MEDICATIONS  (STANDING):  aspirin  chewable 81 milliGRAM(s) Oral daily  atorvastatin 40 milliGRAM(s) Oral at bedtime  dextrose 5% + sodium chloride 0.45% with potassium chloride 20 mEq/L 1000 milliLiter(s) (75 mL/Hr) IV Continuous <Continuous>  digoxin     Tablet 0.125 milliGRAM(s) Oral daily  enoxaparin Injectable 70 milliGRAM(s) SubCutaneous every 12 hours  melatonin 3 milliGRAM(s) Oral at bedtime  metoprolol tartrate 25 milliGRAM(s) Oral two times a day  ondansetron Injectable 4 milliGRAM(s) IV Push once  pantoprazole    Tablet 40 milliGRAM(s) Oral before breakfast  polyethylene glycol 3350 17 Gram(s) Oral daily  senna 2 Tablet(s) Oral at bedtime    MEDICATIONS  (PRN):  acetaminophen   Tablet .. 650 milliGRAM(s) Oral every 4 hours PRN Mild Pain (1 - 3)      __________________________________________________  REVIEW OF SYSTEMS:  CONSTITUTIONAL: No fever   EYES: no acute visual disturbances  NECK: No pain or stiffness  RESPIRATORY: No cough; No shortness of breath  CARDIOVASCULAR: No chest pain, no palpitations  GASTROINTESTINAL: Constipation++ No nausea or vomiting; No diarrhea   NEUROLOGICAL: No headache or numbness, no tremors, Right sided deficits present from old CVA   MUSCULOSKELETAL: No joint pain, no muscle pain  GENITOURINARY: no dysuria, no frequency, no hesitancy  PSYCHIATRY: no depression , no anxiety  ALL OTHER ROS negative        Vital Signs Last 24 Hrs  T(C): 36.9 (03 Jul 2020 07:46), Max: 37 (02 Jul 2020 19:51)  T(F): 98.4 (03 Jul 2020 07:46), Max: 98.6 (02 Jul 2020 19:51)  HR: 74 (03 Jul 2020 07:46) (66 - 86)  BP: 145/79 (03 Jul 2020 07:46) (121/63 - 152/88)  BP(mean): --  RR: 18 (03 Jul 2020 07:46) (18 - 19)  SpO2: 97% (03 Jul 2020 07:46) (96% - 100%)    ________________________________________________  PHYSICAL EXAM:  GENERAL: NAD  HEENT: Normocephalic;  conjunctivae and sclerae clear; moist mucous membranes;   NECK : supple  CHEST/LUNG: Clear to auscultation bilaterally with good air entry   HEART: S1 S2  regular; no murmurs, gallops or rubs  ABDOMEN: Soft,  Nondistended; Bowel sounds present. Discomfort 4/10 on palpation, no rebound tenderness or point tenderness.   EXTREMITIES: no cyanosis; no edema; no calf tenderness  SKIN: warm and dry; no rash  NERVOUS SYSTEM:  Awake and alert; Oriented  to place, person and time ; no new deficits    _________________________________________________  LABS:                        11.5   3.63  )-----------( 228      ( 03 Jul 2020 06:24 )             37.6     07-02    141  |  106  |  4<L>  ----------------------------<  103<H>  3.6   |  26  |  0.58    Ca    9.5      02 Jul 2020 07:06  Phos  3.0     07-02  Mg     1.6     07-02          CAPILLARY BLOOD GLUCOSE            RADIOLOGY & ADDITIONAL TESTS:    Imaging Personally Reviewed:  YES    Consultant(s) Notes Reviewed:   YES    Care Discussed with Consultants : YES     Plan of care was discussed with patient and /or primary care giver; all questions and concerns were addressed and care was aligned with patient's wishes.    Estrella Mullins  [phone]

## 2020-07-03 NOTE — PROGRESS NOTE ADULT - PROBLEM SELECTOR PLAN 4
Started on Miralax 1 tsp x3 days   Senna 2 tablets HS Started on Miralax  x3 days   Senna 2 tablets HS

## 2020-07-03 NOTE — PROGRESS NOTE ADULT - ATTENDING COMMENTS
Patient seen and examined this morning around 11 AM; Agree with PGY1 A/P above with editing as needed. My independent assessment, findings on exam, diagnosis and plan of care as listed below. Discussed with Dr. De La Rosa.    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    Vomiting and abdominal pain has resolved since admission; Tolerating regular diet. Patient denied any chest pain or SOB or abdominal pain. Afebrile.     Vital Signs Last 24 Hrs  T(C): 36.7 (03 Jul 2020 11:39), Max: 37 (02 Jul 2020 19:51)  T(F): 98 (03 Jul 2020 11:39), Max: 98.6 (02 Jul 2020 19:51)  HR: 67 (03 Jul 2020 11:39) (66 - 83)  BP: 139/69 (03 Jul 2020 11:39) (121/63 - 152/77)  RR: 18 (03 Jul 2020 11:39) (18 - 19)  SpO2: 96% (03 Jul 2020 11:39) (96% - 100%)    P/E:  Elderly female, comfortable at rest, dementia but followed simple commands  Neuro: AAO x2; No acute focal deficits; residual right upper/ lower extremity weakness 3/5 chronic  Left UE 5/5, LLE 4/5;   CVS: S1S2 present, regular  Resp: BLAE+, No wheeze or Rhonchi  GI: Soft, BS+, NT  Extr: No edema or calf tenderness    Labs:                        11.5   3.63  )-----------( 228      ( 03 Jul 2020 06:24 )             37.6   07-02    141  |  106  |  4<L>  ----------------------------<  103<H>  3.6   |  26  |  0.58    Ca    9.5      02 Jul 2020 07:06    D/D:  Thrombosis of Left common and Internal iliac vein with suspected May Thurner syndrome  Elevated Troponin likely Demand ischemia with concern for NSTEMI, although less likely  Abdominal pain and Vomiting likely Acute Gastroenteritis resolving  Dementia    Plan:  Telemetry; Await 2D echo results  Cardio follow up Dr. Hand   Vascular Sx consult appreciated; recommended anticoagulation  Hemato/Oncology eval appreciated; Recommended  and Transvaginal sono to f/u ovarian cyst (likely benign radiologically)  Will switch a/c to Apixaban (covered); interestingly was prescribed Apixaban last week unclear by whom as grandson unable to verify and PGY3 unable to reach daughter in law.   May give 10 mg twice daily for one week then reduce to 5 mg twice daily  Patient was on Metoprolol and Digoxin at home raising suspicion for ?? PAF  Continue diet as tolerated   Follow up Echo; If negative and Sonogram negative can D/C Home. Will need Home Care/ Transportation arranged in cordination with family.    Discussed with PGY1 Dr. De La Rosa and PGY2 Dr. EDGARD Chandler and RN Patient seen and examined this morning around 11 AM; Agree with PGY1 A/P above with editing as needed. My independent assessment, findings on exam, diagnosis and plan of care as listed below. Discussed with Dr. De La Rosa.    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 Troponins were mildly elevated.     Vomiting and abdominal pain has resolved since admission; Tolerating regular diet. Patient denied any chest pain or SOB or abdominal pain. Afebrile.     Vital Signs Last 24 Hrs  T(C): 36.7 (03 Jul 2020 11:39), Max: 37 (02 Jul 2020 19:51)  T(F): 98 (03 Jul 2020 11:39), Max: 98.6 (02 Jul 2020 19:51)  HR: 67 (03 Jul 2020 11:39) (66 - 83)  BP: 139/69 (03 Jul 2020 11:39) (121/63 - 152/77)  RR: 18 (03 Jul 2020 11:39) (18 - 19)  SpO2: 96% (03 Jul 2020 11:39) (96% - 100%)    P/E:  Elderly female, comfortable at rest, dementia but followed simple commands  Neuro: AAO x2; No acute focal deficits; residual right upper/ lower extremity weakness 3/5 chronic  Left UE 5/5, LLE 4/5;   CVS: S1S2 present, regular  Resp: BLAE+, No wheeze or Rhonchi  GI: Soft, BS+, NT  Extr: No edema or calf tenderness    Labs:                        11.5   3.63  )-----------( 228      ( 03 Jul 2020 06:24 )             37.6   07-02    141  |  106  |  4<L>  ----------------------------<  103<H>  3.6   |  26  |  0.58    Ca    9.5      02 Jul 2020 07:06    < from: Transthoracic Echocardiogram (07.02.20 @ 08:18) >    CONCLUSIONS:  1. Mitral valve not well visualized, probably normal. Trace  mitral regurgitation.  2. Probably trileaflet aortic valve is not well seen. No  aorticstenosis. Trivial aortic regurgitation.  3. Normal aortic root.  4. Normal left atrium.  5. Normal left ventricular internal dimensions and wall  thicknesses.  6. Normal Left Ventricular Systolic Function,  (EF = 54% by  biplane)  7. Grade I diastolic dysfunction (Impaired relaxation).  8. Normal right atrium.  9. Normal right ventricular size and systolic function  (TAPSE 2.6 cm).  10. Tricuspid valve not well seen. Trace tricuspid  regurgitation.  11. Pulmonic valve not well seen. Trace pulmonic  insufficiency is noted.  12. No pericardial effusion.    < end of copied text >        D/D:  Thrombosis of Left common and Internal iliac vein with suspected May Thurner syndrome  Elevated Troponin likely Demand ischemia with concern for NSTEMI, although less likely  Abdominal pain and Vomiting likely Acute Gastroenteritis resolving  Dementia    Plan:  Telemetry; Await 2D echo results  Cardio follow up Dr. Hand   Vascular Sx consult appreciated; recommended anticoagulation  Hemato/Oncology eval appreciated; Recommended  and Transvaginal sono to f/u ovarian cyst (likely benign radiologically)  Will switch a/c to Apixaban (covered); interestingly was prescribed Apixaban last week unclear by whom as grandson unable to verify and PGY3 unable to reach daughter in law.   May give 10 mg twice daily for one week then reduce to 5 mg twice daily  Patient was on Metoprolol and Digoxin at home raising suspicion for ?? PAF  Continue diet as tolerated   Follow up Echo; If negative and Sonogram negative can D/C Home. Will need Home Care/ Transportation arranged in cordination with family.    Discussed with PGY1 Dr. De La Rosa and PGY2 Dr. EDGARD Chandler and RN Patient seen and examined this morning around 11 AM; Agree with PGY1 A/P above with editing as needed. My independent assessment, findings on exam, diagnosis and plan of care as listed below. Discussed with Dr. De La Rosa.    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 Troponins were mildly elevated.     Vomiting and abdominal pain has resolved since admission; Tolerating regular diet. Patient denied any chest pain or SOB or abdominal pain. Afebrile.     Vital Signs Last 24 Hrs  T(C): 36.7 (03 Jul 2020 11:39), Max: 37 (02 Jul 2020 19:51)  T(F): 98 (03 Jul 2020 11:39), Max: 98.6 (02 Jul 2020 19:51)  HR: 67 (03 Jul 2020 11:39) (66 - 83)  BP: 139/69 (03 Jul 2020 11:39) (121/63 - 152/77)  RR: 18 (03 Jul 2020 11:39) (18 - 19)  SpO2: 96% (03 Jul 2020 11:39) (96% - 100%)    P/E:  Elderly female, comfortable at rest, dementia but followed simple commands  Neuro: AAO x2; No acute focal deficits; residual right upper/ lower extremity weakness 3/5 chronic  Left UE 5/5, LLE 4/5;   CVS: S1S2 present, regular  Resp: BLAE+, No wheeze or Rhonchi  GI: Soft, BS+, NT  Extr: No edema or calf tenderness    Labs:                        11.5   3.63  )-----------( 228      ( 03 Jul 2020 06:24 )             37.6   07-02    141  |  106  |  4<L>  ----------------------------<  103<H>  3.6   |  26  |  0.58    Ca    9.5      02 Jul 2020 07:06    < from: Transthoracic Echocardiogram (07.02.20 @ 08:18) >    CONCLUSIONS:  1. Mitral valve not well visualized, probably normal. Trace  mitral regurgitation.  2. Probably trileaflet aortic valve is not well seen. No aortic stenosis. Trivial aortic regurgitation.  3. Normal aortic root.  4. Normal left atrium.  5. Normal left ventricular internal dimensions and wall thicknesses.  6. Normal Left Ventricular Systolic Function,  (EF = 54% by biplane)  7. Grade I diastolic dysfunction (Impaired relaxation).  8. Normal right atrium.  9. Normal right ventricular size and systolic function (TAPSE 2.6 cm).  10. Tricuspid valve not well seen. Trace tricuspid regurgitation.  11. Pulmonic valve not well seen. Trace pulmonic insufficiency is noted.  12. No pericardial effusion.    D/D:  Thrombosis of Left common and Internal iliac vein with suspected May Thurner syndrome  Elevated Troponin likely Demand ischemia with concern for NSTEMI, although less likely  Abdominal pain and Vomiting likely Acute Gastroenteritis resolving  Dementia    Plan:  D/C Telemetry; Echo WNL  Cardio follow up Dr. Hand   Vascular Sx consult appreciated; recommended anticoagulation  Ca 125 WNL; F/u Transvaginal Sono as per Hemato/Oncology ; Transvaginal sono to f/u ovarian cyst (likely benign radiologically)  Switch a/c to Apixaban (covered);   May give 10 mg twice daily for one week then reduce to 5 mg twice daily  Patient was on Metoprolol and Digoxin at home raising suspicion for ?? PAF  Continue diet as tolerated     Follow up Echo; If negative and Sonogram negative can D/C Home. Will need Home Care/ Transportation arranged in coordination with family.   spoke with Family; request patient to be [facility] in [facility].  Patient is medically stable    Discussed with PGY1 Dr. De La Rosa and PGY2 Dr. EDGARD Chandler and RN Patient seen and examined this morning around 11 AM; Agree with PGY1 A/P above with editing as needed. My independent assessment, findings on exam, diagnosis and plan of care as listed below. Discussed with Dr. De La Rosa.    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 Troponins were mildly elevated.     Vomiting and abdominal pain has resolved since admission; Tolerating regular diet. Patient denied any chest pain or SOB or abdominal pain. Afebrile.     Vital Signs Last 24 Hrs  T(C): 36.7 (03 Jul 2020 11:39), Max: 37 (02 Jul 2020 19:51)  T(F): 98 (03 Jul 2020 11:39), Max: 98.6 (02 Jul 2020 19:51)  HR: 67 (03 Jul 2020 11:39) (66 - 83)  BP: 139/69 (03 Jul 2020 11:39) (121/63 - 152/77)  RR: 18 (03 Jul 2020 11:39) (18 - 19)  SpO2: 96% (03 Jul 2020 11:39) (96% - 100%)    P/E:  Elderly female, comfortable at rest, dementia but followed simple commands  Neuro: AAO x2; No acute focal deficits; residual right upper/ lower extremity weakness 3/5 chronic  Left UE 5/5, LLE 4/5;   CVS: S1S2 present, regular  Resp: BLAE+, No wheeze or Rhonchi  GI: Soft, BS+, NT  Extr: No edema or calf tenderness    Labs:                        11.5   3.63  )-----------( 228      ( 03 Jul 2020 06:24 )             37.6   07-02    141  |  106  |  4<L>  ----------------------------<  103<H>  3.6   |  26  |  0.58    Ca    9.5      02 Jul 2020 07:06    < from: Transthoracic Echocardiogram (07.02.20 @ 08:18) >    CONCLUSIONS:  1. Mitral valve not well visualized, probably normal. Trace  mitral regurgitation.  2. Probably trileaflet aortic valve is not well seen. No aortic stenosis. Trivial aortic regurgitation.  3. Normal aortic root.  4. Normal left atrium.  5. Normal left ventricular internal dimensions and wall thicknesses.  6. Normal Left Ventricular Systolic Function,  (EF = 54% by biplane)  7. Grade I diastolic dysfunction (Impaired relaxation).  8. Normal right atrium.  9. Normal right ventricular size and systolic function (TAPSE 2.6 cm).  10. Tricuspid valve not well seen. Trace tricuspid regurgitation.  11. Pulmonic valve not well seen. Trace pulmonic insufficiency is noted.  12. No pericardial effusion.    D/D:  Thrombosis of Left common and Internal iliac vein with suspected May Thurner syndrome  Elevated Troponin likely Demand ischemia with concern for NSTEMI, although less likely  Abdominal pain and Vomiting likely Acute Gastroenteritis resolving  Dementia    Plan:  D/C Telemetry; Echo WNL  Cardio follow up Dr. Hand   Vascular Sx consult appreciated; recommended anticoagulation  Ca 125 WNL; F/u Transvaginal Sono as per Hemato/Oncology ; Transvaginal sono to f/u ovarian cyst (likely benign radiologically)  Switch a/c to Apixaban (covered);   May give 10 mg twice daily for one week then reduce to 5 mg twice daily  Patient was on Metoprolol and Digoxin at home raising suspicion for ?? PAF  Continue diet as tolerated     Follow up Echo; If negative and Sonogram negative can D/C Home. Will need Home Care/ Transportation arranged in coordination with family.   spoke with Family; request patient to be placed in [facility].  Patient is medically stable    Discussed with PGY1 Dr. De La Rosa and PGY2 Dr. EDGARD Chandler and RN  PGY3 spoke with Grandson    I will be away this weekend 7/4-7/5/20; Hospitalist on call to cover Patient seen and examined this morning around 11 AM; Agree with PGY1 A/P above with editing as needed. My independent assessment, findings on exam, diagnosis and plan of care as listed below. Discussed with Dr. De La Rosa.    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 Troponins were mildly elevated.     Vomiting and abdominal pain has resolved since admission; Tolerating regular diet. Patient denied any chest pain or SOB or abdominal pain. Afebrile.     Vital Signs Last 24 Hrs  T(C): 36.7 (03 Jul 2020 11:39), Max: 37 (02 Jul 2020 19:51)  T(F): 98 (03 Jul 2020 11:39), Max: 98.6 (02 Jul 2020 19:51)  HR: 67 (03 Jul 2020 11:39) (66 - 83)  BP: 139/69 (03 Jul 2020 11:39) (121/63 - 152/77)  RR: 18 (03 Jul 2020 11:39) (18 - 19)  SpO2: 96% (03 Jul 2020 11:39) (96% - 100%)    P/E:  Elderly female, comfortable at rest, dementia but followed simple commands  Neuro: AAO x2; No acute focal deficits; residual right upper/ lower extremity weakness 3/5 chronic  Left UE 5/5, LLE 4/5;   CVS: S1S2 present, regular  Resp: BLAE+, No wheeze or Rhonchi  GI: Soft, BS+, NT  Extr: No edema or calf tenderness    Labs:                        11.5   3.63  )-----------( 228      ( 03 Jul 2020 06:24 )             37.6   07-02    141  |  106  |  4<L>  ----------------------------<  103<H>  3.6   |  26  |  0.58    Ca    9.5      02 Jul 2020 07:06    < from: Transthoracic Echocardiogram (07.02.20 @ 08:18) >    CONCLUSIONS:  1. Mitral valve not well visualized, probably normal. Trace  mitral regurgitation.  2. Probably trileaflet aortic valve is not well seen. No aortic stenosis. Trivial aortic regurgitation.  3. Normal aortic root.  4. Normal left atrium.  5. Normal left ventricular internal dimensions and wall thicknesses.  6. Normal Left Ventricular Systolic Function,  (EF = 54% by biplane)  7. Grade I diastolic dysfunction (Impaired relaxation).  8. Normal right atrium.  9. Normal right ventricular size and systolic function (TAPSE 2.6 cm).  10. Tricuspid valve not well seen. Trace tricuspid regurgitation.  11. Pulmonic valve not well seen. Trace pulmonic insufficiency is noted.  12. No pericardial effusion.    D/D:  Thrombosis of Left common and Internal iliac vein with suspected May Thurner syndrome  Elevated Troponin likely Demand ischemia with concern for NSTEMI, although less likely  Vitamin D deficiency  Folate deficiency  Abdominal pain and Vomiting likely Acute Gastroenteritis resolved  Dementia    Plan:  D/C Telemetry; Echo WNL  Cardio follow up d/w Dr. Hand; No further intervention from cardiac standpoint  Vascular Sx consult appreciated; recommended anticoagulation  Ca 125 WNL; F/u Transvaginal Sono as per Hemato/Oncology ; Transvaginal sono to f/u ovarian cyst (likely benign radiologically)  Switch a/c to Apixaban (covered);   May give 10 mg twice daily for one week then reduce to 5 mg twice daily  Patient was on Metoprolol and Digoxin at home raising suspicion for ?? PAF  Continue diet as tolerated   Low Folate replace twice daily  Low Vit D replace weekly; Replace weekly for 6 to 8 weeks; Repeat after 8 weeks.  Once more than 30, may switch to 1000 units daily maintenance dose   If Sonogram negative can D/C Home. Will need Home Care/ Transportation arranged in coordination with family.   spoke with Family this afternoon; family request patient to be placed in [facility].  Patient is medically stable otherwise once arranged    Discussed with PGY1 Dr. De La Rosa and PGY2 Dr. EDGARD Chandler and RN  PGY3 spoke with Grandson earlier this afternoon and will be speaking to him in person this afternoon once Grandson come to visit    I will be away this weekend 7/4-7/5/20; Hospitalist on call to cover

## 2020-07-03 NOTE — PROGRESS NOTE ADULT - PROBLEM SELECTOR PLAN 1
Patient presented with episode of gastric pain and vomiting which resolved while admitted on ED   Abdominal CT scan yielded partial thrombosis of the left common and internal iliac vein. Patient is on therapeutic Lovenox to reduce the need for needlesticks.  -Doppler negative for LE DVTs  - vascular consult is appreciated, and they recommended AC and OP follow up in 2 months  -Heme/onc consult is appreciated, they recommended r/o of ovarian malignancy with CA-125 and Transvaginal Sonogram   - diet has been advanced from liquids to soft diet

## 2020-07-04 LAB
ALBUMIN SERPL ELPH-MCNC: 2.8 G/DL — LOW (ref 3.5–5)
ALP SERPL-CCNC: 49 U/L — SIGNIFICANT CHANGE UP (ref 40–120)
ALT FLD-CCNC: 29 U/L DA — SIGNIFICANT CHANGE UP (ref 10–60)
ANION GAP SERPL CALC-SCNC: 7 MMOL/L — SIGNIFICANT CHANGE UP (ref 5–17)
AST SERPL-CCNC: 26 U/L — SIGNIFICANT CHANGE UP (ref 10–40)
BILIRUB SERPL-MCNC: 0.8 MG/DL — SIGNIFICANT CHANGE UP (ref 0.2–1.2)
BUN SERPL-MCNC: 6 MG/DL — LOW (ref 7–18)
CALCIUM SERPL-MCNC: 8.9 MG/DL — SIGNIFICANT CHANGE UP (ref 8.4–10.5)
CHLORIDE SERPL-SCNC: 106 MMOL/L — SIGNIFICANT CHANGE UP (ref 96–108)
CO2 SERPL-SCNC: 30 MMOL/L — SIGNIFICANT CHANGE UP (ref 22–31)
CREAT SERPL-MCNC: 0.65 MG/DL — SIGNIFICANT CHANGE UP (ref 0.5–1.3)
GLUCOSE SERPL-MCNC: 91 MG/DL — SIGNIFICANT CHANGE UP (ref 70–99)
HCT VFR BLD CALC: 35.6 % — SIGNIFICANT CHANGE UP (ref 34.5–45)
HGB BLD-MCNC: 11.2 G/DL — LOW (ref 11.5–15.5)
MCHC RBC-ENTMCNC: 27.9 PG — SIGNIFICANT CHANGE UP (ref 27–34)
MCHC RBC-ENTMCNC: 31.5 GM/DL — LOW (ref 32–36)
MCV RBC AUTO: 88.6 FL — SIGNIFICANT CHANGE UP (ref 80–100)
NRBC # BLD: 0 /100 WBCS — SIGNIFICANT CHANGE UP (ref 0–0)
PLATELET # BLD AUTO: 225 K/UL — SIGNIFICANT CHANGE UP (ref 150–400)
POTASSIUM SERPL-MCNC: 3.4 MMOL/L — LOW (ref 3.5–5.3)
POTASSIUM SERPL-SCNC: 3.4 MMOL/L — LOW (ref 3.5–5.3)
PROT SERPL-MCNC: 5.8 G/DL — LOW (ref 6–8.3)
RBC # BLD: 4.02 M/UL — SIGNIFICANT CHANGE UP (ref 3.8–5.2)
RBC # FLD: 14.1 % — SIGNIFICANT CHANGE UP (ref 10.3–14.5)
SODIUM SERPL-SCNC: 143 MMOL/L — SIGNIFICANT CHANGE UP (ref 135–145)
WBC # BLD: 3.57 K/UL — LOW (ref 3.8–10.5)
WBC # FLD AUTO: 3.57 K/UL — LOW (ref 3.8–10.5)

## 2020-07-04 PROCEDURE — 99232 SBSQ HOSP IP/OBS MODERATE 35: CPT | Mod: GC

## 2020-07-04 RX ORDER — SERTRALINE 25 MG/1
25 TABLET, FILM COATED ORAL AT BEDTIME
Refills: 0 | Status: DISCONTINUED | OUTPATIENT
Start: 2020-07-04 | End: 2020-07-09

## 2020-07-04 RX ORDER — APIXABAN 2.5 MG/1
1 TABLET, FILM COATED ORAL
Qty: 0 | Refills: 0 | DISCHARGE

## 2020-07-04 RX ADMIN — ATORVASTATIN CALCIUM 40 MILLIGRAM(S): 80 TABLET, FILM COATED ORAL at 21:09

## 2020-07-04 RX ADMIN — Medication 81 MILLIGRAM(S): at 11:36

## 2020-07-04 RX ADMIN — Medication 1 MILLIGRAM(S): at 08:14

## 2020-07-04 RX ADMIN — Medication 1 MILLIGRAM(S): at 17:15

## 2020-07-04 RX ADMIN — ERGOCALCIFEROL 50000 UNIT(S): 1.25 CAPSULE ORAL at 11:36

## 2020-07-04 RX ADMIN — SERTRALINE 25 MILLIGRAM(S): 25 TABLET, FILM COATED ORAL at 21:10

## 2020-07-04 RX ADMIN — POLYETHYLENE GLYCOL 3350 17 GRAM(S): 17 POWDER, FOR SOLUTION ORAL at 11:36

## 2020-07-04 RX ADMIN — Medication 25 MILLIGRAM(S): at 17:15

## 2020-07-04 RX ADMIN — Medication 25 MILLIGRAM(S): at 05:31

## 2020-07-04 RX ADMIN — Medication 650 MILLIGRAM(S): at 20:18

## 2020-07-04 RX ADMIN — APIXABAN 10 MILLIGRAM(S): 2.5 TABLET, FILM COATED ORAL at 17:15

## 2020-07-04 RX ADMIN — APIXABAN 10 MILLIGRAM(S): 2.5 TABLET, FILM COATED ORAL at 05:30

## 2020-07-04 RX ADMIN — Medication 0.12 MILLIGRAM(S): at 05:31

## 2020-07-04 RX ADMIN — PANTOPRAZOLE SODIUM 40 MILLIGRAM(S): 20 TABLET, DELAYED RELEASE ORAL at 06:08

## 2020-07-04 RX ADMIN — SENNA PLUS 2 TABLET(S): 8.6 TABLET ORAL at 21:09

## 2020-07-04 RX ADMIN — Medication 3 MILLIGRAM(S): at 21:09

## 2020-07-04 NOTE — PHYSICAL THERAPY INITIAL EVALUATION ADULT - ADDITIONAL COMMENTS
Pt is poor historian, per chart review, patient lives in a private home with grandson, has HHA x 4 hours/5days, needs assistance with ADLs. Pt is bedbound for ~ 3 months.

## 2020-07-04 NOTE — PHYSICAL THERAPY INITIAL EVALUATION ADULT - IMPAIRMENTS CONTRIBUTING IMPAIRED BED MOBILITY, REHAB EVAL
impaired motor control/cognition/decreased ROM/impaired balance/abnormal muscle tone/decreased strength

## 2020-07-04 NOTE — PROGRESS NOTE ADULT - SUBJECTIVE AND OBJECTIVE BOX
PGY-1 Progress Note discussed with attending    PAGER #: [[phone]] TILL 5:00 PM  PLEASE CONTACT ON CALL TEAM:  - On Call Team (Please refer to Lucy) FROM 5:00 PM - 8:30PM  - Nightfloat Team FROM 8:30 -7:30 AM    CHIEF COMPLAINT & BRIEF HOSPITAL COURSE:  73 y/o female with PMH of old CVA, and CHF was admitted due to chief complaints of abdominal pain and vomiting. She is currently receiving AC for incidentally discovered May thurner syndrome, and has been ruled negative for NSTEMI. In view of her progressive needs, PT eval was done and discharge to subacute rehab was recommended. Patient had a more depressed affect today, and reported wanting to go home vs more investigations. She was fed pureed and thickened liquids in view of history provided by grandjanes yesterday about prior choking/aspiration episodes since her stroke. She was able to tolerate a pureed/thickened honey consistency diet. She reports having one bowel movement since last night.        INTERVAL HPI/OVERNIGHT EVENTS:       REVIEW OF SYSTEMS:  CONSTITUTIONAL: No fever, weight loss, or fatigue  RESPIRATORY: No cough, wheezing, chills or hemoptysis; No shortness of breath  CARDIOVASCULAR: No chest pain, palpitations, dizziness, or leg swelling  GASTROINTESTINAL: No abdominal pain. No nausea, vomiting, or hematemesis; Constipation +. No melena or hematochezia.  GENITOURINARY: No dysuria or hematuria, urinary frequency  NEUROLOGICAL: No headaches, memory loss, loss of strength + (right sided hemiparesis), numbness, or tremors  SKIN: No itching, burning, rashes, or lesions     MEDICATIONS  (STANDING):  apixaban 10 milliGRAM(s) Oral every 12 hours  aspirin  chewable 81 milliGRAM(s) Oral daily  atorvastatin 40 milliGRAM(s) Oral at bedtime  dextrose 5% + sodium chloride 0.45% with potassium chloride 20 mEq/L 1000 milliLiter(s) (75 mL/Hr) IV Continuous <Continuous>  digoxin     Tablet 0.125 milliGRAM(s) Oral daily  ergocalciferol 24621 Unit(s) Oral <User Schedule>  folic acid 1 milliGRAM(s) Oral <User Schedule>  melatonin 3 milliGRAM(s) Oral at bedtime  metoprolol tartrate 25 milliGRAM(s) Oral two times a day  ondansetron Injectable 4 milliGRAM(s) IV Push once  pantoprazole    Tablet 40 milliGRAM(s) Oral before breakfast  polyethylene glycol 3350 17 Gram(s) Oral daily  senna 2 Tablet(s) Oral at bedtime    MEDICATIONS  (PRN):  acetaminophen   Tablet .. 650 milliGRAM(s) Oral every 4 hours PRN Mild Pain (1 - 3)      Vital Signs Last 24 Hrs  T(C): 36.4 (04 Jul 2020 11:10), Max: 36.8 (03 Jul 2020 23:30)  T(F): 97.6 (04 Jul 2020 11:10), Max: 98.3 (04 Jul 2020 04:42)  HR: 96 (04 Jul 2020 12:01) (62 - 96)  BP: 160/80 (04 Jul 2020 12:01) (136/59 - 160/80)  BP(mean): --  RR: 18 (04 Jul 2020 11:10) (18 - 20)  SpO2: 98% (04 Jul 2020 12:01) (96% - 98%)    PHYSICAL EXAMINATION:  GENERAL: NAD, well built  HEAD:  Atraumatic, Normocephalic  EYES:  conjunctiva and sclera clear  NECK: Supple, No JVD, Normal thyroid  CHEST/LUNG: Clear to auscultation. Clear to percussion bilaterally; No rales, rhonchi, wheezing, or rubs  HEART: Regular rate and rhythm; No murmurs, rubs, or gallops  ABDOMEN: Soft, Nontender, Nondistended; Bowel sounds present  NERVOUS SYSTEM:  Alert & Oriented X3,    EXTREMITIES:  2+ Peripheral Pulses, No clubbing, cyanosis, or edema  SKIN: warm dry                          11.2   3.57  )-----------( 225      ( 04 Jul 2020 07:21 )             35.6     07-04    143  |  106  |  6<L>  ----------------------------<  91  3.4<L>   |  30  |  0.65    Ca    8.9      04 Jul 2020 07:21    TPro  5.8<L>  /  Alb  2.8<L>  /  TBili  0.8  /  DBili  x   /  AST  26  /  ALT  29  /  AlkPhos  49  07-04    LIVER FUNCTIONS - ( 04 Jul 2020 07:21 )  Alb: 2.8 g/dL / Pro: 5.8 g/dL / ALK PHOS: 49 U/L / ALT: 29 U/L DA / AST: 26 U/L / GGT: x                   CAPILLARY BLOOD GLUCOSE      RADIOLOGY & ADDITIONAL TESTS:

## 2020-07-04 NOTE — PROGRESS NOTE ADULT - PROBLEM SELECTOR PLAN 3
- Patient with residual left sided paresi and left facial droop  - Patient's deficits are status quo   - PT recommends d/c to [facility] in view of progressive needs.

## 2020-07-05 LAB
ALBUMIN SERPL ELPH-MCNC: 3 G/DL — LOW (ref 3.5–5)
ALP SERPL-CCNC: 57 U/L — SIGNIFICANT CHANGE UP (ref 40–120)
ALT FLD-CCNC: 42 U/L DA — SIGNIFICANT CHANGE UP (ref 10–60)
ANION GAP SERPL CALC-SCNC: 8 MMOL/L — SIGNIFICANT CHANGE UP (ref 5–17)
AST SERPL-CCNC: 42 U/L — HIGH (ref 10–40)
BILIRUB SERPL-MCNC: 0.9 MG/DL — SIGNIFICANT CHANGE UP (ref 0.2–1.2)
BUN SERPL-MCNC: 11 MG/DL — SIGNIFICANT CHANGE UP (ref 7–18)
CALCIUM SERPL-MCNC: 9.2 MG/DL — SIGNIFICANT CHANGE UP (ref 8.4–10.5)
CHLORIDE SERPL-SCNC: 106 MMOL/L — SIGNIFICANT CHANGE UP (ref 96–108)
CO2 SERPL-SCNC: 28 MMOL/L — SIGNIFICANT CHANGE UP (ref 22–31)
CREAT SERPL-MCNC: 0.66 MG/DL — SIGNIFICANT CHANGE UP (ref 0.5–1.3)
GLUCOSE SERPL-MCNC: 103 MG/DL — HIGH (ref 70–99)
HCT VFR BLD CALC: 37.5 % — SIGNIFICANT CHANGE UP (ref 34.5–45)
HGB BLD-MCNC: 11.8 G/DL — SIGNIFICANT CHANGE UP (ref 11.5–15.5)
MCHC RBC-ENTMCNC: 27.8 PG — SIGNIFICANT CHANGE UP (ref 27–34)
MCHC RBC-ENTMCNC: 31.5 GM/DL — LOW (ref 32–36)
MCV RBC AUTO: 88.4 FL — SIGNIFICANT CHANGE UP (ref 80–100)
NRBC # BLD: 0 /100 WBCS — SIGNIFICANT CHANGE UP (ref 0–0)
PLATELET # BLD AUTO: 221 K/UL — SIGNIFICANT CHANGE UP (ref 150–400)
POTASSIUM SERPL-MCNC: 3.7 MMOL/L — SIGNIFICANT CHANGE UP (ref 3.5–5.3)
POTASSIUM SERPL-SCNC: 3.7 MMOL/L — SIGNIFICANT CHANGE UP (ref 3.5–5.3)
PROT SERPL-MCNC: 6.1 G/DL — SIGNIFICANT CHANGE UP (ref 6–8.3)
RBC # BLD: 4.24 M/UL — SIGNIFICANT CHANGE UP (ref 3.8–5.2)
RBC # FLD: 14 % — SIGNIFICANT CHANGE UP (ref 10.3–14.5)
SODIUM SERPL-SCNC: 142 MMOL/L — SIGNIFICANT CHANGE UP (ref 135–145)
WBC # BLD: 3.86 K/UL — SIGNIFICANT CHANGE UP (ref 3.8–10.5)
WBC # FLD AUTO: 3.86 K/UL — SIGNIFICANT CHANGE UP (ref 3.8–10.5)

## 2020-07-05 PROCEDURE — 76856 US EXAM PELVIC COMPLETE: CPT | Mod: 26

## 2020-07-05 PROCEDURE — 99232 SBSQ HOSP IP/OBS MODERATE 35: CPT

## 2020-07-05 PROCEDURE — 76830 TRANSVAGINAL US NON-OB: CPT | Mod: 26

## 2020-07-05 RX ADMIN — Medication 81 MILLIGRAM(S): at 13:15

## 2020-07-05 RX ADMIN — Medication 3 MILLIGRAM(S): at 22:04

## 2020-07-05 RX ADMIN — APIXABAN 10 MILLIGRAM(S): 2.5 TABLET, FILM COATED ORAL at 06:11

## 2020-07-05 RX ADMIN — SENNA PLUS 2 TABLET(S): 8.6 TABLET ORAL at 22:04

## 2020-07-05 RX ADMIN — ATORVASTATIN CALCIUM 40 MILLIGRAM(S): 80 TABLET, FILM COATED ORAL at 22:04

## 2020-07-05 RX ADMIN — Medication 0.12 MILLIGRAM(S): at 06:11

## 2020-07-05 RX ADMIN — Medication 1 MILLIGRAM(S): at 09:00

## 2020-07-05 RX ADMIN — Medication 1 MILLIGRAM(S): at 17:37

## 2020-07-05 RX ADMIN — PANTOPRAZOLE SODIUM 40 MILLIGRAM(S): 20 TABLET, DELAYED RELEASE ORAL at 06:12

## 2020-07-05 RX ADMIN — SERTRALINE 25 MILLIGRAM(S): 25 TABLET, FILM COATED ORAL at 22:04

## 2020-07-05 RX ADMIN — Medication 25 MILLIGRAM(S): at 06:12

## 2020-07-05 RX ADMIN — APIXABAN 10 MILLIGRAM(S): 2.5 TABLET, FILM COATED ORAL at 17:37

## 2020-07-05 RX ADMIN — Medication 25 MILLIGRAM(S): at 17:38

## 2020-07-05 RX ADMIN — POLYETHYLENE GLYCOL 3350 17 GRAM(S): 17 POWDER, FOR SOLUTION ORAL at 13:15

## 2020-07-05 NOTE — PROGRESS NOTE ADULT - PROBLEM SELECTOR PLAN 1
Patient presented with episode of gastric pain and vomiting which resolved while admitted on ED   Abdominal CT scan yielded partial thrombosis of the left common and internal iliac vein. Patient is on therapeutic Lovenox to reduce the need for needlesticks.  -Doppler negative for LE DVTs  - vascular consult is appreciated, and they recommended AC and OP follow up in 2 months  -Heme/onc consult is appreciated, recommend r/o of ovarian malignancy with CA-125 and Transvaginal Sonogram - pending   - diet has been advanced from liquids to soft diet

## 2020-07-05 NOTE — PROGRESS NOTE ADULT - PROBLEM SELECTOR PLAN 2
Patient presented with elevated troponin , EKG NSR with nonspecific ST-T segment changes s/o NSTEMI   - troponin is trending up: 0.129; 0.136; 0.171   -c/w full dose Lovenox  - Reviewed Echo normal EF

## 2020-07-05 NOTE — PROGRESS NOTE ADULT - SUBJECTIVE AND OBJECTIVE BOX
CHIEF COMPLAINT & BRIEF HOSPITAL COURSE:  73 y/o female with PMH of old CVA, and CHF was admitted due to chief complaints of abdominal pain and vomiting. She is currently receiving AC for incidentally discovered May thurner syndrome, and has been ruled negative for NSTEMI. In view of her progressive needs, PT eval was done and discharge to subacute rehab was recommended. Patient had a more depressed affect today, and reported wanting to go home vs more investigations. She was fed pureed and thickened liquids in view of history provided by grandson yesterday about prior choking/aspiration episodes since her stroke. She was able to tolerate a pureed/thickened honey consistency diet. She reports having one bowel movement since last night.        INTERVAL HPI/OVERNIGHT EVENTS: No events over night.       REVIEW OF SYSTEMS:  CONSTITUTIONAL: No fever, weight loss, or fatigue  RESPIRATORY: No cough, wheezing, chills or hemoptysis; No shortness of breath  CARDIOVASCULAR: No chest pain, palpitations, dizziness, or leg swelling  GASTROINTESTINAL: No abdominal pain. No nausea, vomiting, or hematemesis; Constipation +. No melena or hematochezia.  GENITOURINARY: No dysuria or hematuria, urinary frequency  NEUROLOGICAL: No headaches, memory loss, loss of strength + (right sided hemiparesis), numbness, or tremors  SKIN: No itching, burning, rashes, or lesions     MEDICATIONS  (STANDING):  apixaban 10 milliGRAM(s) Oral every 12 hours  aspirin  chewable 81 milliGRAM(s) Oral daily  atorvastatin 40 milliGRAM(s) Oral at bedtime  dextrose 5% + sodium chloride 0.45% with potassium chloride 20 mEq/L 1000 milliLiter(s) (75 mL/Hr) IV Continuous <Continuous>  digoxin     Tablet 0.125 milliGRAM(s) Oral daily  ergocalciferol 43821 Unit(s) Oral <User Schedule>  folic acid 1 milliGRAM(s) Oral <User Schedule>  melatonin 3 milliGRAM(s) Oral at bedtime  metoprolol tartrate 25 milliGRAM(s) Oral two times a day  ondansetron Injectable 4 milliGRAM(s) IV Push once  pantoprazole    Tablet 40 milliGRAM(s) Oral before breakfast  polyethylene glycol 3350 17 Gram(s) Oral daily  senna 2 Tablet(s) Oral at bedtime  sertraline 25 milliGRAM(s) Oral at bedtime    MEDICATIONS  (PRN):  acetaminophen   Tablet .. 650 milliGRAM(s) Oral every 4 hours PRN Mild Pain (1 - 3)      T(C): 37 (07-05-20 @ 05:37), Max: 37 (07-05-20 @ 05:37)  HR: 92 (07-05-20 @ 05:37) (92 - 92)  BP: 148/72 (07-05-20 @ 05:37) (148/72 - 148/72)  RR: 17 (07-05-20 @ 05:37) (17 - 17)  SpO2: 99% (07-05-20 @ 05:37) (99% - 99%)      PHYSICAL EXAMINATION:  GENERAL: NAD, well built  HEAD:  Atraumatic, Normocephalic  EYES:  conjunctiva and sclera clear  NECK: Supple, No JVD, Normal thyroid  CHEST/LUNG: Clear to auscultation. Clear to percussion bilaterally; No rales, rhonchi, wheezing, or rubs  HEART: Regular rate and rhythm; No murmurs, rubs, or gallops  ABDOMEN: Soft, Nontender, Nondistended; Bowel sounds present  NERVOUS SYSTEM:  Alert & Oriented X3,    EXTREMITIES:  2+ Peripheral Pulses, No clubbing, cyanosis, or edema  SKIN: warm dry                                             11.8   3.86  )-----------( 221      ( 05 Jul 2020 06:59 )             37.5           LIVER FUNCTIONS - ( 05 Jul 2020 06:59 )  Alb: 3.0 g/dL / Pro: 6.1 g/dL / ALK PHOS: 57 U/L / ALT: 42 U/L DA / AST: 42 U/L / GGT: x             142|106|11<103  3.7|28|0.66  9.2,--,--  07-05 @ 06:59      CAPILLARY BLOOD GLUCOSE              CAPILLARY BLOOD GLUCOSE      RADIOLOGY & ADDITIONAL TESTS:

## 2020-07-06 ENCOUNTER — TRANSCRIPTION ENCOUNTER (OUTPATIENT)
Age: 75
End: 2020-07-06

## 2020-07-06 LAB
ALBUMIN SERPL ELPH-MCNC: 3.1 G/DL — LOW (ref 3.5–5)
ALP SERPL-CCNC: 58 U/L — SIGNIFICANT CHANGE UP (ref 40–120)
ALT FLD-CCNC: 45 U/L DA — SIGNIFICANT CHANGE UP (ref 10–60)
ANION GAP SERPL CALC-SCNC: 8 MMOL/L — SIGNIFICANT CHANGE UP (ref 5–17)
ANION GAP SERPL CALC-SCNC: 8 MMOL/L — SIGNIFICANT CHANGE UP (ref 5–17)
AST SERPL-CCNC: 72 U/L — HIGH (ref 10–40)
BILIRUB SERPL-MCNC: 0.9 MG/DL — SIGNIFICANT CHANGE UP (ref 0.2–1.2)
BUN SERPL-MCNC: 11 MG/DL — SIGNIFICANT CHANGE UP (ref 7–18)
BUN SERPL-MCNC: 11 MG/DL — SIGNIFICANT CHANGE UP (ref 7–18)
CALCIUM SERPL-MCNC: 9.2 MG/DL — SIGNIFICANT CHANGE UP (ref 8.4–10.5)
CALCIUM SERPL-MCNC: 9.4 MG/DL — SIGNIFICANT CHANGE UP (ref 8.4–10.5)
CHLORIDE SERPL-SCNC: 106 MMOL/L — SIGNIFICANT CHANGE UP (ref 96–108)
CHLORIDE SERPL-SCNC: 106 MMOL/L — SIGNIFICANT CHANGE UP (ref 96–108)
CO2 SERPL-SCNC: 25 MMOL/L — SIGNIFICANT CHANGE UP (ref 22–31)
CO2 SERPL-SCNC: 28 MMOL/L — SIGNIFICANT CHANGE UP (ref 22–31)
CREAT SERPL-MCNC: 0.64 MG/DL — SIGNIFICANT CHANGE UP (ref 0.5–1.3)
CREAT SERPL-MCNC: 0.69 MG/DL — SIGNIFICANT CHANGE UP (ref 0.5–1.3)
GLUCOSE SERPL-MCNC: 102 MG/DL — HIGH (ref 70–99)
GLUCOSE SERPL-MCNC: 143 MG/DL — HIGH (ref 70–99)
HCT VFR BLD CALC: 41.6 % — SIGNIFICANT CHANGE UP (ref 34.5–45)
HGB BLD-MCNC: 12.9 G/DL — SIGNIFICANT CHANGE UP (ref 11.5–15.5)
MCHC RBC-ENTMCNC: 27.4 PG — SIGNIFICANT CHANGE UP (ref 27–34)
MCHC RBC-ENTMCNC: 31 GM/DL — LOW (ref 32–36)
MCV RBC AUTO: 88.5 FL — SIGNIFICANT CHANGE UP (ref 80–100)
NRBC # BLD: 0 /100 WBCS — SIGNIFICANT CHANGE UP (ref 0–0)
PLATELET # BLD AUTO: 246 K/UL — SIGNIFICANT CHANGE UP (ref 150–400)
POTASSIUM SERPL-MCNC: 3.5 MMOL/L — SIGNIFICANT CHANGE UP (ref 3.5–5.3)
POTASSIUM SERPL-MCNC: 5.7 MMOL/L — HIGH (ref 3.5–5.3)
POTASSIUM SERPL-SCNC: 3.5 MMOL/L — SIGNIFICANT CHANGE UP (ref 3.5–5.3)
POTASSIUM SERPL-SCNC: 5.7 MMOL/L — HIGH (ref 3.5–5.3)
PROT SERPL-MCNC: 6.8 G/DL — SIGNIFICANT CHANGE UP (ref 6–8.3)
RBC # BLD: 4.7 M/UL — SIGNIFICANT CHANGE UP (ref 3.8–5.2)
RBC # FLD: 13.9 % — SIGNIFICANT CHANGE UP (ref 10.3–14.5)
SARS-COV-2 RNA SPEC QL NAA+PROBE: SIGNIFICANT CHANGE UP
SODIUM SERPL-SCNC: 139 MMOL/L — SIGNIFICANT CHANGE UP (ref 135–145)
SODIUM SERPL-SCNC: 142 MMOL/L — SIGNIFICANT CHANGE UP (ref 135–145)
WBC # BLD: 5.36 K/UL — SIGNIFICANT CHANGE UP (ref 3.8–10.5)
WBC # FLD AUTO: 5.36 K/UL — SIGNIFICANT CHANGE UP (ref 3.8–10.5)

## 2020-07-06 PROCEDURE — 99232 SBSQ HOSP IP/OBS MODERATE 35: CPT | Mod: GC

## 2020-07-06 RX ORDER — APIXABAN 2.5 MG/1
2 TABLET, FILM COATED ORAL
Qty: 56 | Refills: 0
Start: 2020-07-06 | End: 2020-07-19

## 2020-07-06 RX ORDER — ERGOCALCIFEROL 1.25 MG/1
1 CAPSULE ORAL
Qty: 6 | Refills: 0
Start: 2020-07-06 | End: 2020-08-19

## 2020-07-06 RX ORDER — SERTRALINE 25 MG/1
1 TABLET, FILM COATED ORAL
Qty: 0 | Refills: 0 | DISCHARGE
Start: 2020-07-06

## 2020-07-06 RX ORDER — APIXABAN 2.5 MG/1
1 TABLET, FILM COATED ORAL
Qty: 60 | Refills: 0
Start: 2020-07-06 | End: 2020-08-04

## 2020-07-06 RX ORDER — APIXABAN 2.5 MG/1
2 TABLET, FILM COATED ORAL
Qty: 28 | Refills: 0
Start: 2020-07-06 | End: 2020-07-12

## 2020-07-06 RX ORDER — ASPIRIN/CALCIUM CARB/MAGNESIUM 324 MG
1 TABLET ORAL
Qty: 0 | Refills: 0 | DISCHARGE
Start: 2020-07-06

## 2020-07-06 RX ORDER — FOLIC ACID 0.8 MG
1 TABLET ORAL
Qty: 0 | Refills: 0 | DISCHARGE
Start: 2020-07-06

## 2020-07-06 RX ADMIN — Medication 1 MILLIGRAM(S): at 17:02

## 2020-07-06 RX ADMIN — Medication 3 MILLIGRAM(S): at 22:51

## 2020-07-06 RX ADMIN — Medication 650 MILLIGRAM(S): at 22:51

## 2020-07-06 RX ADMIN — Medication 81 MILLIGRAM(S): at 11:00

## 2020-07-06 RX ADMIN — PANTOPRAZOLE SODIUM 40 MILLIGRAM(S): 20 TABLET, DELAYED RELEASE ORAL at 05:31

## 2020-07-06 RX ADMIN — SERTRALINE 25 MILLIGRAM(S): 25 TABLET, FILM COATED ORAL at 22:51

## 2020-07-06 RX ADMIN — APIXABAN 10 MILLIGRAM(S): 2.5 TABLET, FILM COATED ORAL at 17:02

## 2020-07-06 RX ADMIN — APIXABAN 10 MILLIGRAM(S): 2.5 TABLET, FILM COATED ORAL at 05:30

## 2020-07-06 RX ADMIN — ATORVASTATIN CALCIUM 40 MILLIGRAM(S): 80 TABLET, FILM COATED ORAL at 22:51

## 2020-07-06 RX ADMIN — Medication 0.12 MILLIGRAM(S): at 05:30

## 2020-07-06 RX ADMIN — Medication 1 MILLIGRAM(S): at 08:11

## 2020-07-06 RX ADMIN — Medication 25 MILLIGRAM(S): at 05:30

## 2020-07-06 RX ADMIN — Medication 25 MILLIGRAM(S): at 17:02

## 2020-07-06 NOTE — DISCHARGE NOTE PROVIDER - NSDCPNSUBOBJ_GEN_ALL_CORE
Patient is a 74y old  Female who presents with a chief complaint of Abdominal Pain and Vomiting (08 Jul 2020 16:15)        Today    Patient was seen and examined at bedside    Denies any abd pain         INTERVAL HPI/OVERNIGHT EVENTS:    T(C): 36.6 (07-09-20 @ 05:20), Max: 37.2 (07-08-20 @ 21:14)    HR: 83 (07-09-20 @ 05:20) (69 - 99)    BP: 148/68 (07-09-20 @ 05:20) (145/70 - 157/68)    RR: 17 (07-09-20 @ 05:20) (17 - 17)    SpO2: 98% (07-09-20 @ 05:20) (98% - 99%)    Wt(kg): --    I&O's Summary            REVIEW OF SYSTEMS: denies fever, chills, SOB, palpitations, chest pain, abdominal pain, nausea, vomiting, diarrhea, constipation, dizziness        MEDICATIONS  (STANDING):    apixaban 10 milliGRAM(s) Oral every 12 hours    aspirin  chewable 81 milliGRAM(s) Oral daily    atorvastatin 40 milliGRAM(s) Oral at bedtime    digoxin     Tablet 0.125 milliGRAM(s) Oral daily    ergocalciferol 07520 Unit(s) Oral <User Schedule>    folic acid 1 milliGRAM(s) Oral <User Schedule>    melatonin 3 milliGRAM(s) Oral at bedtime    metoprolol tartrate 25 milliGRAM(s) Oral two times a day    ondansetron Injectable 4 milliGRAM(s) IV Push once    pantoprazole    Tablet 40 milliGRAM(s) Oral before breakfast    polyethylene glycol 3350 17 Gram(s) Oral daily    senna 2 Tablet(s) Oral at bedtime    sertraline 25 milliGRAM(s) Oral at bedtime    sodium chloride 0.9%. 1000 milliLiter(s) (75 mL/Hr) IV Continuous <Continuous>        MEDICATIONS  (PRN):    acetaminophen   Tablet .. 650 milliGRAM(s) Oral every 4 hours PRN Mild Pain (1 - 3)            PHYSICAL EXAM:    GENERAL: NAD    NERVOUS SYSTEM:  Alert & Oriented X2, Good concentration; residual weakness in right side     CHEST/LUNG: Clear to auscultation bilaterally; No rales, rhonchi, wheezing, or rubs    HEART: Regular rate and rhythm; No murmurs, rubs, or gallops    ABDOMEN: Soft, Nontender, Nondistended; Bowel sounds present    EXTREMITIES:  2+ Peripheral Pulses, No clubbing, cyanosis, or edema    SKIN: No rashes or lesions        LABS:                            11.5     4.05  )-----------( 220      ( 09 Jul 2020 07:04 )               36.2         142  |  107  |  13    ----------------------------<  88    3.6   |  27  |  0.52        Assessment and plan:    1. Left common and internal iliac vein thrombosis due to May Thurner syndrome    2. NSTEMI     3. CVA with RSH     4. Dementia     5. Constipation     6. Abd pain     7. Possible Paroxysmal Afib (on digoxin and metoprolol)    8. Simple ovarian cyst         Plan:     Abd pain resolved     Cont bowel regimen    Will discharge on NOAC     Cont Aspirin and statin     Will need outpatient follow up for Ovarian cyst     Stable to be discharged

## 2020-07-06 NOTE — PROGRESS NOTE ADULT - PROBLEM SELECTOR PLAN 3
- Patient with residual left sided paresis and left facial droop  - Patient's deficits are status quo   - Contractures noted on lower extremities, Will likely benefit from physiotherapy  - PT recommends d/c to [facility] in view of progressive needs.

## 2020-07-06 NOTE — PROGRESS NOTE ADULT - ATTENDING COMMENTS
Patient was examined at the bedside with Dr. Foote.     Thrombosis of vein.  Plan: Patient presented with episode of gastric pain and vomiting which resolved while admitted on ED   Abdominal CT scan yielded partial thrombosis of the left common and internal iliac vein. Patient is on therapeutic Lovenox to reduce the need for needlesticks.  -Doppler negative for LE DVTs  - vascular consult is appreciated, and they recommended AC and OP follow up in 2 months  -Heme/onc consult is appreciated,  - CA -125 normal, US vaginal shows ovarian cyst  - diet has been advanced from liquids to soft diet  - Patient is medically stable for discharge with eliquis 10mg BID for 7 days and 5mg BID later on  -Not a candidate for stent placement.     Hypercoagulability,  needs anticoagulation, on NOAC.   Demand ischemia, stable  s/p CVA, stable     Depression, now on mirtazapine.     Plan:  As above.  Discharge to [facility].

## 2020-07-06 NOTE — DISCHARGE NOTE PROVIDER - HOSPITAL COURSE
Done. Please fax.   73 y/o female with PMH of old CVA, and CHF was admitted due to chief complaints of abdominal pain and vomiting. She is currently receiving AC for incidentally discovered May thurner syndrome, and has been ruled negative for NSTEMI. In view of her progressive needs, PT eval was done and discharge to subacute rehab was recommended. Patient had a more depressed affect today, and reported wanting to go home vs more investigations. She was fed pureed and thickened liquids in view of history provided by grandson yesterday about prior choking/aspiration episodes since her stroke. She was able to tolerate a pureed/thickened honey consistency diet. She reports having one bowel movement since last night. 73 y/o female with PMH of old CVA, and CHF was admitted due to chief complaints of abdominal pain and vomiting. CT abdomen showed thrombosis in left common and internal iliac vein. Patient was started on heparin infusion and later it was changed to oral anticoagulation. PAtient was also evaluated by Vascular surgery and due to her comorbidities , she is not a candidate od stent placement. She is currently receiving AC for incidentally discovered May Thurner syndrome, and has been ruled negative for NSTEMI. In view of her progressive needs, PT eval was done and discharge to subacute rehab was recommended. Patient had a more depressed affect in the hospital and she was started on Zoloft.. She was fed pureed and thickened liquids in view of history about prior choking/aspiration episodes since her stroke. She was able to tolerate a pureed/thickened honey consistency diet. Patient was having bowel movements            Given patient's improved clinical status and current hemodynamic stability, decision was made to discharge. Discussed with attending    Please refer to patient's complete medical chart with documents for a full hospital course, for this is only a brief summary. 75 y/o female with PMH of old CVA, and CHF was admitted due to chief complaints of abdominal pain and vomiting. CT abdomen showed thrombosis in left common and internal iliac vein. Patient was started on heparin infusion and later it was changed to oral anticoagulation. PAtient was also evaluated by Vascular surgery and due to her comorbidities , she is not a candidate od stent placement. She is currently receiving AC for incidentally discovered May Thurner syndrome, and has been ruled negative for NSTEMI. In view of her progressive needs, PT eval was done and discharge to subacute rehab was recommended. Patient had a more depressed affect in the hospital and she was started on Zoloft.. She was fed pureed and thickened liquids in view of history about prior choking/aspiration episodes since her stroke. She was able to tolerate a pureed/thickened honey consistency diet. Patient was having bowel movements.            Given patient's improved clinical status and current hemodynamic stability, decision was made to discharge. Discussed with attending    Please refer to patient's complete medical chart with documents for a full hospital course, for this is only a brief summary. 73 y/o female with PMH of old CVA was admitted due to chief complaints of abdominal pain and vomiting. CT abdomen showed thrombosis in left common and internal iliac vein. Patient was started on heparin infusion and later it was changed to oral anticoagulation. PAtient was also evaluated by Vascular surgery and due to her comorbidities , she is not a candidate od stent placement. She is currently receiving AC for incidentally discovered May Thurner syndrome, and has been ruled negative for ACS. In view of her progressive needs, PT eval was done and discharge to subacute rehab was recommended. Patient had a more depressed affect in the hospital and she was started on Zoloft.. She was fed pureed and thickened liquids in view of history about prior choking/aspiration episodes since her stroke. She was able to tolerate a pureed/thickened honey consistency diet. Patient was having bowel movements.            Given patient's improved clinical status and current hemodynamic stability, decision was made to discharge. Discussed with attending    Please refer to patient's complete medical chart with documents for a full hospital course, for this is only a brief summary. 75 y/o female with PMH of CVA in march , Atrial fibrillation and  was admitted due to chief complaints of abdominal pain and vomiting. CT abdomen showed thrombosis in left common and internal iliac vein. Patient was started on heparin infusion and later it was changed to oral anticoagulation. PAtient was also evaluated by Vascular surgery and due to her comorbidities , she is not a candidate od stent placement. She is currently receiving AC for incidentally discovered May Thurner syndrome, and has been ruled negative for ACS. In view of her progressive needs, PT eval was done and discharge to subacute rehab was recommended. Patient had a more depressed affect in the hospital and she was started on Zoloft.. She was fed pureed and thickened liquids in view of history about prior choking/aspiration episodes since her stroke. She was able to tolerate a pureed/thickened honey consistency diet. Patient was having bowel movements.            Given patient's improved clinical status and current hemodynamic stability, decision was made to discharge. Discussed with attending    Please refer to patient's complete medical chart with documents for a full hospital course, for this is only a brief summary.

## 2020-07-06 NOTE — PROGRESS NOTE ADULT - PROBLEM SELECTOR PLAN 2
Patient presented with elevated troponin , EKG NSR with nonspecific ST-T segment changes s/o NSTEMI   - troponin trended down  Likely demand ischemia, NSTEMI ruled out  -c/w Eliquis 10mg BID  - Reviewed Echo normal EF

## 2020-07-06 NOTE — PROGRESS NOTE ADULT - SUBJECTIVE AND OBJECTIVE BOX
PGY-1 Progress Note discussed with attending    PAGER #: [[phone]] TILL 5:00 PM  PLEASE CONTACT ON CALL TEAM:   - On Call Team (Please refer to Lucy) FROM 5:00 PM - 8:30PM  - Nightfloat Team FROM 8:30 -7:30 AM    CHIEF COMPLAINT & BRIEF HOSPITAL COURSE: ***    INTERVAL HPI/OVERNIGHT EVENTS:       REVIEW OF SYSTEMS:  CONSTITUTIONAL: No fever, weight loss, or fatigue  RESPIRATORY: No cough, wheezing, chills or hemoptysis; No shortness of breath  CARDIOVASCULAR: No chest pain, palpitations, dizziness, or leg swelling  GASTROINTESTINAL: No abdominal pain. No nausea, vomiting, or hematemesis; No diarrhea or constipation. No melena or hematochezia.  GENITOURINARY: No dysuria or hematuria, urinary frequency  NEUROLOGICAL: No headaches, memory loss, loss of strength, numbness, or tremors  SKIN: No itching, burning, rashes, or lesions     Vital Signs Last 24 Hrs  T(C): 36.7 (06 Jul 2020 14:23), Max: 37.1 (05 Jul 2020 21:31)  T(F): 98 (06 Jul 2020 14:23), Max: 98.7 (05 Jul 2020 21:31)  HR: 70 (06 Jul 2020 14:23) (70 - 75)  BP: 145/64 (06 Jul 2020 14:23) (145/64 - 148/64)  BP(mean): --  RR: 16 (06 Jul 2020 14:23) (16 - 17)  SpO2: 97% (06 Jul 2020 14:23) (96% - 99%)    PHYSICAL EXAMINATION:  GENERAL: NAD, well built  HEAD:  Atraumatic, Normocephalic  EYES:  conjunctiva and sclera clear  NECK: Supple, No JVD, Normal thyroid  CHEST/LUNG: Clear to auscultation. Clear to percussion bilaterally; No rales, rhonchi, wheezing, or rubs  HEART: Regular rate and rhythm; No murmurs, rubs, or gallops  ABDOMEN: Soft, Nontender, Nondistended; Bowel sounds present  NERVOUS SYSTEM:  Alert & Oriented X3,    EXTREMITIES:  2+ Peripheral Pulses, No clubbing, cyanosis, or edema  SKIN: warm dry                          12.9   5.36  )-----------( 246      ( 06 Jul 2020 06:38 )             41.6     07-06    142  |  106  |  11  ----------------------------<  143<H>  3.5   |  28  |  0.64    Ca    9.2      06 Jul 2020 10:50    TPro  6.8  /  Alb  3.1<L>  /  TBili  0.9  /  DBili  x   /  AST  72<H>  /  ALT  45  /  AlkPhos  58  07-06    LIVER FUNCTIONS - ( 06 Jul 2020 06:38 )  Alb: 3.1 g/dL / Pro: 6.8 g/dL / ALK PHOS: 58 U/L / ALT: 45 U/L DA / AST: 72 U/L / GGT: x                   CAPILLARY BLOOD GLUCOSE      RADIOLOGY & ADDITIONAL TESTS: PGY-1 Progress Note discussed with attending    PAGER #: [[phone]] TILL 5:00 PM  PLEASE CONTACT ON CALL TEAM:   - On Call Team (Please refer to Lucy) FROM 5:00 PM - 8:30PM  - Nightfloat Team FROM 8:30 -7:30 AM    CHIEF COMPLAINT & BRIEF HOSPITAL COURSE: 75 y/o female with PMH of old CVA, and CHF was admitted due to chief complaints of abdominal pain and vomiting. She is currently receiving AC for incidentally discovered May Thurner syndrome, and has been ruled negative for NSTEMI. In view of her progressive needs, PT eval was done and discharge to subacute rehab was recommended. Patient has been started on Zoloft for depressed affect. She was able to tolerate a pureed/thickened honey consistency diet. She reports having one bowel movement since last night.        INTERVAL HPI/OVERNIGHT EVENTS: No significant event was reported ovenright  Patient was seen and examined by bedside. Her mood appears to be better than before      REVIEW OF SYSTEMS:  CONSTITUTIONAL: No fever, weight loss, or fatigue  RESPIRATORY: No cough, wheezing, chills or hemoptysis; No shortness of breath  CARDIOVASCULAR: No chest pain, palpitations, dizziness, or leg swelling  GASTROINTESTINAL: No abdominal pain. No nausea, vomiting, or hematemesis; No diarrhea or constipation. No melena or hematochezia.  GENITOURINARY: No dysuria or hematuria, urinary frequency  NEUROLOGICAL: No headaches, memory loss, loss of strength, numbness, or tremors  SKIN: No itching, burning, rashes, or lesions     Vital Signs Last 24 Hrs  T(C): 36.7 (06 Jul 2020 14:23), Max: 37.1 (05 Jul 2020 21:31)  T(F): 98 (06 Jul 2020 14:23), Max: 98.7 (05 Jul 2020 21:31)  HR: 70 (06 Jul 2020 14:23) (70 - 75)  BP: 145/64 (06 Jul 2020 14:23) (145/64 - 148/64)  BP(mean): --  RR: 16 (06 Jul 2020 14:23) (16 - 17)  SpO2: 97% (06 Jul 2020 14:23) (96% - 99%)    PHYSICAL EXAMINATION:  GENERAL: NAD, well built  HEAD:  Atraumatic, Normocephalic  EYES:  conjunctiva and sclera clear  NECK: Supple, No JVD, Normal thyroid  CHEST/LUNG: Clear to auscultation. Clear to percussion bilaterally; No rales, rhonchi, wheezing, or rubs  HEART: Regular rate and rhythm; No murmurs, rubs, or gallops  ABDOMEN: Soft, Nontender, Nondistended; Bowel sounds present  NERVOUS SYSTEM:  Alert & Oriented X2,   EXTREMITIES:  2+ Peripheral Pulses, No clubbing, cyanosis, or edema, contractures on right lower extremity  SKIN: warm dry                          12.9   5.36  )-----------( 246      ( 06 Jul 2020 06:38 )             41.6     07-06    142  |  106  |  11  ----------------------------<  143<H>  3.5   |  28  |  0.64    Ca    9.2      06 Jul 2020 10:50    TPro  6.8  /  Alb  3.1<L>  /  TBili  0.9  /  DBili  x   /  AST  72<H>  /  ALT  45  /  AlkPhos  58  07-06    LIVER FUNCTIONS - ( 06 Jul 2020 06:38 )  Alb: 3.1 g/dL / Pro: 6.8 g/dL / ALK PHOS: 58 U/L / ALT: 45 U/L DA / AST: 72 U/L / GGT: x                   CAPILLARY BLOOD GLUCOSE      RADIOLOGY & ADDITIONAL TESTS:

## 2020-07-06 NOTE — DISCHARGE NOTE PROVIDER - CARE PROVIDERS DIRECT ADDRESSES
[email or web address]

## 2020-07-06 NOTE — DISCHARGE NOTE PROVIDER - CARE PROVIDER_API CALL
King Huang  VASCULAR SURGERY  [address] [address] [place] [address]  Phone: [phone]  Fax: [phone]  Follow Up Time:     Dante Limon  INTERNAL MEDICINE  [address] [place] [address]  Phone: [phone]  Fax: [phone]  Follow Up Time:

## 2020-07-06 NOTE — PROGRESS NOTE ADULT - PROBLEM SELECTOR PLAN 1
Patient presented with episode of gastric pain and vomiting which resolved while admitted on ED   Abdominal CT scan yielded partial thrombosis of the left common and internal iliac vein. Patient is on therapeutic Lovenox to reduce the need for needlesticks.  -Doppler negative for LE DVTs  - vascular consult is appreciated, and they recommended AC and OP follow up in 2 months  -Heme/onc consult is appreciated,  - CA -125 normal, US vaginal shows ovarian cyst  - diet has been advanced from liquids to soft diet  - Patient is medically stable for discharge with eliquis 10mg BID for 7 days and 5mg BID later on  -Not a candidate for stent placement

## 2020-07-06 NOTE — DISCHARGE NOTE PROVIDER - NSDCMRMEDTOKEN_GEN_ALL_CORE_FT
apixaban 5 mg oral tablet: 2 tab(s) orally 2 times a day   aspirin 81 mg oral tablet, chewable: 1 tab(s) orally once a day  atorvastatin 40 mg oral tablet: 1 tab(s) orally once a day  digoxin 125 mcg (0.125 mg) oral tablet: 1 tab(s) orally once a day  ergocalciferol 50,000 intl units (1.25 mg) oral capsule: 1 cap(s) orally once a week  folic acid 1 mg oral tablet: 1 tab(s) orally   metoprolol tartrate 25 mg oral tablet: 1 tab(s) orally 2 times a day  pantoprazole 40 mg oral delayed release tablet: 1 tab(s) orally once a day  sertraline 25 mg oral tablet: 1 tab(s) orally once a day (at bedtime)  Tylenol 325 mg oral tablet: 2 tab(s) orally every 4 hours, As Needed apixaban 5 mg oral tablet: 2 tab(s) orally 2 times a day   apixaban 5 mg oral tablet: 2 tab(s) orally 2 times a day   apixaban 5 mg oral tablet: 1 tab(s) orally 2 times a day , Start after completing 7 days  aspirin 81 mg oral tablet, chewable: 1 tab(s) orally once a day  atorvastatin 40 mg oral tablet: 1 tab(s) orally once a day  digoxin 125 mcg (0.125 mg) oral tablet: 1 tab(s) orally once a day  ergocalciferol 50,000 intl units (1.25 mg) oral capsule: 1 cap(s) orally once a week  folic acid 1 mg oral tablet: 1 tab(s) orally   metoprolol tartrate 25 mg oral tablet: 1 tab(s) orally 2 times a day  pantoprazole 40 mg oral delayed release tablet: 1 tab(s) orally once a day  sertraline 25 mg oral tablet: 1 tab(s) orally once a day (at bedtime)  Tylenol 325 mg oral tablet: 2 tab(s) orally every 4 hours, As Needed apixaban 5 mg oral tablet: 2 tab(s) orally 2 times a day   apixaban 5 mg oral tablet: 1 tab(s) orally 2 times a day ,   aspirin 81 mg oral tablet, chewable: 1 tab(s) orally once a day  atorvastatin 40 mg oral tablet: 1 tab(s) orally once a day  digoxin 125 mcg (0.125 mg) oral tablet: 1 tab(s) orally once a day  ergocalciferol 50,000 intl units (1.25 mg) oral capsule: 1 cap(s) orally once a week  folic acid 1 mg oral tablet: 1 tab(s) orally   metoprolol tartrate 25 mg oral tablet: 1 tab(s) orally 2 times a day  pantoprazole 40 mg oral delayed release tablet: 1 tab(s) orally once a day  polyethylene glycol 3350 oral powder for reconstitution: 17 gram(s) orally once a day  senna oral tablet: 2 tab(s) orally once a day (at bedtime)  sertraline 25 mg oral tablet: 1 tab(s) orally once a day (at bedtime)  Tylenol 325 mg oral tablet: 2 tab(s) orally every 4 hours, As Needed apixaban 5 mg oral tablet: 1 tab(s) orally 2 times a day ,   aspirin 81 mg oral tablet, chewable: 1 tab(s) orally once a day  atorvastatin 40 mg oral tablet: 1 tab(s) orally once a day  digoxin 125 mcg (0.125 mg) oral tablet: 1 tab(s) orally once a day  ergocalciferol 50,000 intl units (1.25 mg) oral capsule: 1 cap(s) orally once a week  folic acid 1 mg oral tablet: 1 tab(s) orally   metoprolol tartrate 25 mg oral tablet: 1 tab(s) orally 2 times a day  pantoprazole 40 mg oral delayed release tablet: 1 tab(s) orally once a day  polyethylene glycol 3350 oral powder for reconstitution: 17 gram(s) orally once a day  senna oral tablet: 2 tab(s) orally once a day (at bedtime)  sertraline 25 mg oral tablet: 1 tab(s) orally once a day (at bedtime)  Tylenol 325 mg oral tablet: 2 tab(s) orally every 4 hours, As Needed

## 2020-07-06 NOTE — PROGRESS NOTE ADULT - SUBJECTIVE AND OBJECTIVE BOX
feel fine now  no nausea or abd pain  left leg is less swollen    MEDICATIONS  (STANDING):  apixaban 10 milliGRAM(s) Oral every 12 hours  aspirin  chewable 81 milliGRAM(s) Oral daily  atorvastatin 40 milliGRAM(s) Oral at bedtime  dextrose 5% + sodium chloride 0.45% with potassium chloride 20 mEq/L 1000 milliLiter(s) (75 mL/Hr) IV Continuous <Continuous>  digoxin     Tablet 0.125 milliGRAM(s) Oral daily  ergocalciferol 38561 Unit(s) Oral <User Schedule>  folic acid 1 milliGRAM(s) Oral <User Schedule>  melatonin 3 milliGRAM(s) Oral at bedtime  metoprolol tartrate 25 milliGRAM(s) Oral two times a day  ondansetron Injectable 4 milliGRAM(s) IV Push once  pantoprazole    Tablet 40 milliGRAM(s) Oral before breakfast  sertraline 25 milliGRAM(s) Oral at bedtime    MEDICATIONS  (PRN):  acetaminophen   Tablet .. 650 milliGRAM(s) Oral every 4 hours PRN Mild Pain (1 - 3)      Allergies    No Known Allergies    Intolerances        Vital Signs Last 24 Hrs  T(C): 36.9 (06 Jul 2020 05:30), Max: 37.1 (05 Jul 2020 21:31)  T(F): 98.4 (06 Jul 2020 05:30), Max: 98.7 (05 Jul 2020 21:31)  HR: 75 (06 Jul 2020 05:30) (70 - 78)  BP: 147/74 (06 Jul 2020 05:30) (147/74 - 153/71)  BP(mean): --  RR: 17 (06 Jul 2020 05:30) (16 - 17)  SpO2: 99% (06 Jul 2020 05:30) (96% - 99%)    PHYSICAL EXAM  General: adult in NAD  HEENT: clear oropharynx, anicteric sclera, pink conjunctiva  Neck: supple  CV: normal S1/S2 with no murmur rubs or gallops  Lungs: positive air movement b/l ant lungs,clear to auscultation, no wheezes, no rales  Abdomen: soft non-tender non-distended, no hepatosplenomegaly  Ext: no clubbing cyanosis or edema  Skin: no rashes and no petechiae  Neuro: alert and oriented X 4, no focal deficits  LABS:                          12.9   5.36  )-----------( 246      ( 06 Jul 2020 06:38 )             41.6         Mean Cell Volume : 88.5 fl  Mean Cell Hemoglobin : 27.4 pg  Mean Cell Hemoglobin Concentration : 31.0 gm/dL  Auto Neutrophil # : x  Auto Lymphocyte # : x  Auto Monocyte # : x  Auto Eosinophil # : x  Auto Basophil # : x  Auto Neutrophil % : x  Auto Lymphocyte % : x  Auto Monocyte % : x  Auto Eosinophil % : x  Auto Basophil % : x    Serial CBC  Hematocrit 41.6  Hemoglobin 12.9  Plat 246  RBC 4.70  WBC 5.36  Serial CBC  Hematocrit 37.5  Hemoglobin 11.8  Plat 221  RBC 4.24  WBC 3.86  Serial CBC  Hematocrit 35.6  Hemoglobin 11.2  Plat 225  RBC 4.02  WBC 3.57  Serial CBC  Hematocrit 37.6  Hemoglobin 11.5  Plat 228  RBC 4.21  WBC 3.63    07-06    139  |  106  |  11  ----------------------------<  102<H>  5.7<H>   |  25  |  0.69    Ca    9.4      06 Jul 2020 06:38    TPro  6.8  /  Alb  3.1<L>  /  TBili  0.9  /  DBili  x   /  AST  72<H>  /  ALT  45  /  AlkPhos  58  07-06          Vitamin B12, Serum: 355 pg/mL (07-03 @ 14:12)  Folate, Serum: 3.4 ng/mL (07-03 @ 14:12)            BLOOD SMEAR INTERPRETATION:       RADIOLOGY & ADDITIONAL STUDIES:

## 2020-07-06 NOTE — DISCHARGE NOTE PROVIDER - NSDCCPCAREPLAN_GEN_ALL_CORE_FT
PRINCIPAL DISCHARGE DIAGNOSIS  Diagnosis: Deep venous thrombosis  Assessment and Plan of Treatment: You came to hospital with acute onset of left leg pain. CT abdomen showed thrombosis in the left iliac veins. You were started on anticoagulation. Please continue tasking your prescribed medications regularly.   Follow up with the hematologist outpatient for medication adjustment evaluation      SECONDARY DISCHARGE DIAGNOSES  Diagnosis: Elevated troponin  Assessment and Plan of Treatment:   - You presented to the hospital with a complaint of abdominal pain.   - EKG showed normal sinus rhythm.   - you got Admitted to telemetry unit.   - Your serial cardiac enzymes were slightly elevated but they trended down and it was likely due to demand ischemia  - No arrhythmia seen on Tele monitor.   - Your Echocardiogram was done and did not show any significant valvular pathology.    - You were seen by cardiologist Dr. Hand   - We are able to rule out acute coronary syndrome as the cause of your pain at this time.   - You are medically stable to be discharged from the hospital.  - You need to follow up with your Primary Care Physician in 1 week.    Diagnosis: Ovarian cyst  Assessment and Plan of Treatment: You were found to have incidental ovarian cyst on CT Abdomnen and ultrasound. Follow up with OB/GYN outpatient PRINCIPAL DISCHARGE DIAGNOSIS  Diagnosis: Deep venous thrombosis  Assessment and Plan of Treatment: You came to hospital with acute onset of left leg pain. CT abdomen showed thrombosis in the left iliac veins. You were started on anticoagulation. Please continue tasking your prescribed medications regularly.   Follow up with the hematologist outpatient for medication adjustment evaluation      SECONDARY DISCHARGE DIAGNOSES  Diagnosis: Elevated troponin  Assessment and Plan of Treatment:   - You presented to the hospital with a complaint of abdominal pain.   - EKG showed normal sinus rhythm.   - you got Admitted to telemetry unit.   - Your serial cardiac enzymes were slightly elevated but they trended down and it was likely due to demand ischemia  - No arrhythmia seen on Tele monitor.   - Your Echocardiogram was done and did not show any significant valvular pathology.    - You were seen by cardiologist Dr. Hand   - We are able to rule out acute coronary syndrome as the cause of your pain at this time.   - You are medically stable to be discharged from the hospital.  - You need to follow up with your Primary Care Physician in 1 week.    Diagnosis: Vitamin D deficiency  Assessment and Plan of Treatment: Please take prescribed supplements and follow up with your PCP in 6-8 weeks for repeating levels    Diagnosis: Ovarian cyst  Assessment and Plan of Treatment: You were found to have incidental ovarian cyst on CT Abdomnen and ultrasound. Follow up with OB/GYN outpatient PRINCIPAL DISCHARGE DIAGNOSIS  Diagnosis: Deep venous thrombosis  Assessment and Plan of Treatment: You came to hospital with acute onset of left leg pain. CT abdomen showed thrombosis in the left iliac veins. You were started on anticoagulation. Please continue taking your prescribed medications regularly.   Follow up with the hematologist outpatient for medication adjustment evaluation      SECONDARY DISCHARGE DIAGNOSES  Diagnosis: Elevated troponin  Assessment and Plan of Treatment:   - You presented to the hospital with a complaint of abdominal pain.   - EKG showed normal sinus rhythm.   - you got Admitted to telemetry unit.   - Your serial cardiac enzymes were slightly elevated but they trended down and it was likely due to demand ischemia  - No arrhythmia seen on Tele monitor.   - Your Echocardiogram was done and did not show any significant valvular pathology.    - You were seen by cardiologist Dr. Hand   - We are able to rule out acute coronary syndrome as the cause of your pain at this time.   - You are medically stable to be discharged from the hospital.  - You need to follow up with your Primary Care Physician in 1 week.    Diagnosis: Constipation  Assessment and Plan of Treatment: You complained of constipation and lower abdominal pain. CT abdomen shows fecal matter retention. We have started you no bowel regimen. Please take your medications as prescribed and follow up with your PCP    Diagnosis: Vitamin D deficiency  Assessment and Plan of Treatment: Please take prescribed supplements and follow up with your PCP in 6-8 weeks for repeating levels    Diagnosis: Ovarian cyst  Assessment and Plan of Treatment: You were found to have incidental ovarian cyst on CT Abdomnen and ultrasound. Follow up with OB/GYN outpatient PRINCIPAL DISCHARGE DIAGNOSIS  Diagnosis: Deep venous thrombosis  Assessment and Plan of Treatment: You came to hospital with acute onset of left leg pain. CT abdomen showed thrombosis in the left iliac veins. You were started on anticoagulation. Please continue taking your prescribed medications regularly.   Follow up with the hematologist outpatient for medication adjustment evaluation      SECONDARY DISCHARGE DIAGNOSES  Diagnosis: Constipation  Assessment and Plan of Treatment: You complained of constipation and lower abdominal pain. CT abdomen shows fecal matter retention. We have started you on bowel regimen. Please take your medications as prescribed and follow up with your PCP    Diagnosis: Vitamin D deficiency  Assessment and Plan of Treatment: Please take prescribed supplements and follow up with your PCP in 6-8 weeks for repeating levels    Diagnosis: Ovarian cyst  Assessment and Plan of Treatment: You were found to have incidental ovarian cyst on CT Abdomnen and ultrasound. Follow up with OB/GYN outpatient    Diagnosis: Elevated troponin  Assessment and Plan of Treatment:   - You presented to the hospital with a complaint of abdominal pain.   - EKG showed normal sinus rhythm.   - you got Admitted to telemetry unit.   - Your serial cardiac enzymes were slightly elevated but they trended down and it was likely due to demand ischemia  - No arrhythmia seen on Tele monitor.   - Your Echocardiogram was done and did not show any significant valvular pathology.    - You were seen by cardiologist Dr. Hand   - We are able to rule out acute coronary syndrome as the cause of your pain at this time.   - You are medically stable to be discharged from the hospital.  - You need to follow up with your Primary Care Physician in 1 week. PRINCIPAL DISCHARGE DIAGNOSIS  Diagnosis: Deep venous thrombosis  Assessment and Plan of Treatment: You came to hospital with acute onset of left leg pain. CT abdomen showed thrombosis in the left iliac veins. You were started on anticoagulation. Please continue taking your prescribed medications regularly.   Follow up with the hematologist outpatient for medication adjustment evaluation      SECONDARY DISCHARGE DIAGNOSES  Diagnosis: AF (atrial fibrillation)  Assessment and Plan of Treatment: Please continue with your rate control medication as instructed in the medication reconciliation and your primary care physician.    Diagnosis: Elevated troponin  Assessment and Plan of Treatment:   - You presented to the hospital with a complaint of abdominal pain.   - EKG showed normal sinus rhythm.   - you got Admitted to telemetry unit.   - Your serial cardiac enzymes were slightly elevated but they trended down and it was likely due to demand ischemia  - No arrhythmia seen on Tele monitor.   - Your Echocardiogram was done and did not show any significant valvular pathology.    - You were seen by cardiologist Dr. Hand   - We are able to rule out acute coronary syndrome as the cause of your pain at this time.   - You are medically stable to be discharged from the hospital.  - You need to follow up with your Primary Care Physician in 1 week.    Diagnosis: Constipation  Assessment and Plan of Treatment: You complained of constipation and lower abdominal pain. CT abdomen shows fecal matter retention. We have started you on bowel regimen. Please take your medications as prescribed and follow up with your PCP    Diagnosis: Vitamin D deficiency  Assessment and Plan of Treatment: Please take prescribed supplements and follow up with your PCP in 6-8 weeks for repeating levels    Diagnosis: Ovarian cyst  Assessment and Plan of Treatment: You were found to have incidental ovarian cyst on CT Abdomnen and ultrasound. Follow up with OB/GYN outpatient

## 2020-07-07 LAB
ALBUMIN SERPL ELPH-MCNC: 3.1 G/DL — LOW (ref 3.5–5)
ALP SERPL-CCNC: 51 U/L — SIGNIFICANT CHANGE UP (ref 40–120)
ALT FLD-CCNC: 39 U/L DA — SIGNIFICANT CHANGE UP (ref 10–60)
ANION GAP SERPL CALC-SCNC: 8 MMOL/L — SIGNIFICANT CHANGE UP (ref 5–17)
AST SERPL-CCNC: 32 U/L — SIGNIFICANT CHANGE UP (ref 10–40)
BILIRUB SERPL-MCNC: 0.7 MG/DL — SIGNIFICANT CHANGE UP (ref 0.2–1.2)
BUN SERPL-MCNC: 13 MG/DL — SIGNIFICANT CHANGE UP (ref 7–18)
CALCIUM SERPL-MCNC: 9.1 MG/DL — SIGNIFICANT CHANGE UP (ref 8.4–10.5)
CHLORIDE SERPL-SCNC: 105 MMOL/L — SIGNIFICANT CHANGE UP (ref 96–108)
CO2 SERPL-SCNC: 28 MMOL/L — SIGNIFICANT CHANGE UP (ref 22–31)
CREAT SERPL-MCNC: 0.57 MG/DL — SIGNIFICANT CHANGE UP (ref 0.5–1.3)
GLUCOSE SERPL-MCNC: 107 MG/DL — HIGH (ref 70–99)
HCT VFR BLD CALC: 36 % — SIGNIFICANT CHANGE UP (ref 34.5–45)
HGB BLD-MCNC: 11.5 G/DL — SIGNIFICANT CHANGE UP (ref 11.5–15.5)
HOMOCYSTEINE LEVEL: 44.1 UMOL/L — HIGH
MCHC RBC-ENTMCNC: 27.7 PG — SIGNIFICANT CHANGE UP (ref 27–34)
MCHC RBC-ENTMCNC: 31.9 GM/DL — LOW (ref 32–36)
MCV RBC AUTO: 86.7 FL — SIGNIFICANT CHANGE UP (ref 80–100)
METHYLMALONIC ACID LEVEL: 107 NMOL/L — SIGNIFICANT CHANGE UP (ref 87–318)
NRBC # BLD: 0 /100 WBCS — SIGNIFICANT CHANGE UP (ref 0–0)
PLATELET # BLD AUTO: 233 K/UL — SIGNIFICANT CHANGE UP (ref 150–400)
POTASSIUM SERPL-MCNC: 3.5 MMOL/L — SIGNIFICANT CHANGE UP (ref 3.5–5.3)
POTASSIUM SERPL-SCNC: 3.5 MMOL/L — SIGNIFICANT CHANGE UP (ref 3.5–5.3)
PROT SERPL-MCNC: 6.1 G/DL — SIGNIFICANT CHANGE UP (ref 6–8.3)
RBC # BLD: 4.15 M/UL — SIGNIFICANT CHANGE UP (ref 3.8–5.2)
RBC # FLD: 14 % — SIGNIFICANT CHANGE UP (ref 10.3–14.5)
SODIUM SERPL-SCNC: 141 MMOL/L — SIGNIFICANT CHANGE UP (ref 135–145)
WBC # BLD: 4.32 K/UL — SIGNIFICANT CHANGE UP (ref 3.8–10.5)
WBC # FLD AUTO: 4.32 K/UL — SIGNIFICANT CHANGE UP (ref 3.8–10.5)

## 2020-07-07 PROCEDURE — 74018 RADEX ABDOMEN 1 VIEW: CPT | Mod: 26

## 2020-07-07 PROCEDURE — 99233 SBSQ HOSP IP/OBS HIGH 50: CPT | Mod: GC

## 2020-07-07 RX ORDER — IBUPROFEN 200 MG
400 TABLET ORAL EVERY 6 HOURS
Refills: 0 | Status: DISCONTINUED | OUTPATIENT
Start: 2020-07-07 | End: 2020-07-08

## 2020-07-07 RX ORDER — SIMETHICONE 80 MG/1
80 TABLET, CHEWABLE ORAL ONCE
Refills: 0 | Status: COMPLETED | OUTPATIENT
Start: 2020-07-07 | End: 2020-07-07

## 2020-07-07 RX ORDER — POLYETHYLENE GLYCOL 3350 17 G/17G
17 POWDER, FOR SOLUTION ORAL DAILY
Refills: 0 | Status: DISCONTINUED | OUTPATIENT
Start: 2020-07-07 | End: 2020-07-08

## 2020-07-07 RX ORDER — SENNA PLUS 8.6 MG/1
2 TABLET ORAL AT BEDTIME
Refills: 0 | Status: DISCONTINUED | OUTPATIENT
Start: 2020-07-07 | End: 2020-07-09

## 2020-07-07 RX ADMIN — APIXABAN 10 MILLIGRAM(S): 2.5 TABLET, FILM COATED ORAL at 17:05

## 2020-07-07 RX ADMIN — SENNA PLUS 2 TABLET(S): 8.6 TABLET ORAL at 21:25

## 2020-07-07 RX ADMIN — APIXABAN 10 MILLIGRAM(S): 2.5 TABLET, FILM COATED ORAL at 05:12

## 2020-07-07 RX ADMIN — Medication 0.12 MILLIGRAM(S): at 05:12

## 2020-07-07 RX ADMIN — Medication 400 MILLIGRAM(S): at 05:12

## 2020-07-07 RX ADMIN — Medication 81 MILLIGRAM(S): at 11:22

## 2020-07-07 RX ADMIN — Medication 25 MILLIGRAM(S): at 17:05

## 2020-07-07 RX ADMIN — PANTOPRAZOLE SODIUM 40 MILLIGRAM(S): 20 TABLET, DELAYED RELEASE ORAL at 05:12

## 2020-07-07 RX ADMIN — Medication 1 MILLIGRAM(S): at 09:05

## 2020-07-07 RX ADMIN — Medication 3 MILLIGRAM(S): at 21:25

## 2020-07-07 RX ADMIN — Medication 25 MILLIGRAM(S): at 05:12

## 2020-07-07 RX ADMIN — SIMETHICONE 80 MILLIGRAM(S): 80 TABLET, CHEWABLE ORAL at 17:14

## 2020-07-07 RX ADMIN — Medication 1 MILLIGRAM(S): at 17:05

## 2020-07-07 RX ADMIN — SERTRALINE 25 MILLIGRAM(S): 25 TABLET, FILM COATED ORAL at 21:25

## 2020-07-07 RX ADMIN — ATORVASTATIN CALCIUM 40 MILLIGRAM(S): 80 TABLET, FILM COATED ORAL at 21:26

## 2020-07-07 NOTE — DIETITIAN INITIAL EVALUATION ADULT. - PROBLEM SELECTOR PLAN 5
IMPROVE VTE Individual Risk Assessment    RISK                                                                Points  [  ] Previous VTE                                                  3  [  ] Thrombophilia                                               2  [  ] Lower limb paralysis                                      2        (unable to hold up >15 seconds)    [  ] Current Cancer                                              2         (within 6 months)  [x  ] Immobilization > 24 hrs                                1  [  ] ICU/CCU stay > 24 hours                              1  [  x] Age > 60                                                      1  IMPROVE VTE Score _________

## 2020-07-07 NOTE — PROGRESS NOTE ADULT - SUBJECTIVE AND OBJECTIVE BOX
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 She is currently receiving AC for incidentally discovered May Thurner syndrome, and has been ruled negative for NSTEMI. In view of her progressive needs, PT eval was done and discharge to subacute rehab was recommended. Patient has been started on Zoloft for depressed affect. She was able to tolerate a pureed/thickened honey consistency diet.    INTERVAL HPI/OVERNIGHT EVENTS:   Overnight the patient reported left lower quadrant abdominal pain and was given Motrin which improved the pain. No other events or concerns. This morning she is asymptomatic with only some mild discomfort in her left upper leg. Patient was seen and examined by bedside; benign abdomen. No LE swelling, erythema or exquisite tenderness to palpation. Her mood remains improved.    REVIEW OF SYSTEMS:  CONSTITUTIONAL: No fever, weight loss, or fatigue  RESPIRATORY: No cough, wheezing, chills or hemoptysis; No shortness of breath  CARDIOVASCULAR: No chest pain, palpitations, dizziness, or leg swelling  GASTROINTESTINAL: No abdominal pain. No nausea, vomiting, or hematemesis; No diarrhea or constipation. No melena or hematochezia.  GENITOURINARY: No dysuria or hematuria, urinary frequency  MSK: + left upper thigh discomfort  NEUROLOGICAL: No headaches, + dementia, + chronic right hemiparesis + chronic loss of strength, no  tremors  SKIN: No itching, burning, rashes, or lesions       Vital Signs Last 24 Hrs  T(C): 36.3 (07 Jul 2020 05:29), Max: 36.7 (06 Jul 2020 14:23)  T(F): 97.4 (07 Jul 2020 05:29), Max: 98 (06 Jul 2020 14:23)  HR: 81 (07 Jul 2020 05:29) (70 - 81)  BP: 158/81 (07 Jul 2020 05:29) (130/68 - 158/81)  BP(mean): --  RR: 17 (07 Jul 2020 05:29) (16 - 17)  SpO2: 98% (07 Jul 2020 05:29) (97% - 98%)    PHYSICAL EXAMINATION:  GENERAL: NAD, well built with chronic stable facial droop  HEAD:  Atraumatic, Normocephalic  EYES:  conjunctiva and sclera clear  NECK: Supple, No JVD, Normal thyroid  CHEST/LUNG: Clear to auscultation. Clear to percussion bilaterally; No rales, rhonchi, wheezing, or rubs  HEART: Regular rate and rhythm; No murmurs, rubs, or gallops  ABDOMEN: Soft, Nontender, Nondistended; Bowel sounds present all four quadrants  NERVOUS SYSTEM:  Alert & Oriented X2, no new neurological deficit  EXTREMITIES:  2+ Peripheral Pulses, No clubbing, cyanosis, or edema, (+) contractures on right lower extremity  SKIN: warm dry, intact                          11.5   4.32  )-----------( 233      ( 07 Jul 2020 06:10 )             36.0     07-07    141  |  105  |  13  ----------------------------<  107<H>  3.5   |  28  |  0.57    Ca    9.1      07 Jul 2020 06:10    TPro  6.1  /  Alb  3.1<L>  /  TBili  0.7  /  DBili  x   /  AST  32  /  ALT  39  /  AlkPhos  51  07-07      LIVER FUNCTIONS - ( 07 Jul 2020 06:10 )  Alb: 3.1 g/dL / Pro: 6.1 g/dL / ALK PHOS: 51 U/L / ALT: 39 U/L DA / AST: 32 U/L / GGT: x             CAPILLARY BLOOD GLUCOSE  Glucose, Serum: 107 mg/dL (07.07.20 @ 06:10)    RADIOLOGY & ADDITIONAL TESTS:  Xray Abdomen 1 View PORTABLE -Routine (07.07.20 @ 10:49)   EXAM:  XR ABDOMEN PORTABLE ROUTINE 1V                            PROCEDURE DATE:  07/07/2020    INTERPRETATION:  Abdominal x-ray    Indication: Abdominal pain.  Single supine AP view of the abdomen is acquired without a previous abdominal x-ray for comparison.  Impression: Surgical clips in the right upper quadrant abdomen.  Paucity of small bowel gas.  No gross evidence for organomegaly.    INTERPRETATION:  CLINICAL INFORMATION: Left ovarian cystic lesion on CT  LMP: CT 7/1  COMPARISON: CT same day  TECHNIQUE:   Endovaginal pelvic sonogram as per order. Transabdominal pelvic sonogram was also performed as part of our standard protocol. Color and Spectral Doppler was performed.    FINDINGS:  Uterus: Hysterectomy  Right ovary: Not visualized.   Left ovary: 3.5 x 4 x 3.5 cm. 3.3 cm unilocular cyst. No septations or vascularized mural nodules.  Fluid: None.    IMPRESSION:  3.3 cm simple left ovarian cyst. 6-12 months follow-up ultrasound is recommended. 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 She is currently receiving AC for incidentally discovered May Thurner syndrome, and has been ruled negative for NSTEMI. In view of her progressive needs, PT eval was done and discharge to subacute rehab was recommended. Patient has been started on Zoloft for depressed affect. She was able to tolerate a pureed/thickened honey consistency diet.    INTERVAL HPI/OVERNIGHT EVENTS:   Overnight the patient reported left lower quadrant abdominal pain and was given Motrin which improved the pain. No other events or concerns. This morning she is asymptomatic with only some mild discomfort in her left upper leg. Patient was seen and examined by bedside; benign abdomen. No LE swelling, erythema or exquisite tenderness to palpation. Her mood remains improved.    REVIEW OF SYSTEMS:  CONSTITUTIONAL: No fever, weight loss, or fatigue  RESPIRATORY: No cough, wheezing, chills or hemoptysis; No shortness of breath  CARDIOVASCULAR: No chest pain, palpitations, dizziness, or leg swelling  GASTROINTESTINAL: No abdominal pain. No nausea, vomiting, or hematemesis; No diarrhea or constipation. No melena or hematochezia.  GENITOURINARY: No dysuria or hematuria, urinary frequency  MSK: + left upper thigh discomfort  NEUROLOGICAL: No headaches, + dementia, + chronic right hemiparesis + chronic loss of strength, no  tremors  SKIN: No itching, burning, rashes, or lesions       Vital Signs Last 24 Hrs  T(C): 36.3 (07 Jul 2020 05:29), Max: 36.7 (06 Jul 2020 14:23)  T(F): 97.4 (07 Jul 2020 05:29), Max: 98 (06 Jul 2020 14:23)  HR: 81 (07 Jul 2020 05:29) (70 - 81)  BP: 158/81 (07 Jul 2020 05:29) (130/68 - 158/81)  BP(mean): --  RR: 17 (07 Jul 2020 05:29) (16 - 17)  SpO2: 98% (07 Jul 2020 05:29) (97% - 98%)    PHYSICAL EXAMINATION:  GENERAL: NAD, well built with chronic stable facial droop  HEAD:  Atraumatic, Normocephalic  EYES:  conjunctiva and sclera clear  NECK: Supple, No JVD, Normal thyroid  CHEST/LUNG: Clear to auscultation. Clear to auscultation bilaterally; No rales, rhonchi, wheezing, or rubs  HEART: Regular rate and rhythm; No murmurs, rubs, or gallops  ABDOMEN: Soft, Nontender, Nondistended; Bowel sounds present all four quadrants  NERVOUS SYSTEM:  Alert & Oriented X2, no new neurological deficit  EXTREMITIES:  2+ Peripheral Pulses, No clubbing, cyanosis, or edema, (+) contractures on right lower extremity  SKIN: warm dry, intact                          11.5   4.32  )-----------( 233      ( 07 Jul 2020 06:10 )             36.0     07-07    141  |  105  |  13  ----------------------------<  107<H>  3.5   |  28  |  0.57    Ca    9.1      07 Jul 2020 06:10    TPro  6.1  /  Alb  3.1<L>  /  TBili  0.7  /  DBili  x   /  AST  32  /  ALT  39  /  AlkPhos  51  07-07      LIVER FUNCTIONS - ( 07 Jul 2020 06:10 )  Alb: 3.1 g/dL / Pro: 6.1 g/dL / ALK PHOS: 51 U/L / ALT: 39 U/L DA / AST: 32 U/L / GGT: x             CAPILLARY BLOOD GLUCOSE  Glucose, Serum: 107 mg/dL (07.07.20 @ 06:10)    RADIOLOGY & ADDITIONAL TESTS:  Xray Abdomen 1 View PORTABLE -Routine (07.07.20 @ 10:49)   EXAM:  XR ABDOMEN PORTABLE ROUTINE 1V                            PROCEDURE DATE:  07/07/2020    INTERPRETATION:  Abdominal x-ray    Indication: Abdominal pain.  Single supine AP view of the abdomen is acquired without a previous abdominal x-ray for comparison.  Impression: Surgical clips in the right upper quadrant abdomen.  Paucity of small bowel gas.  No gross evidence for organomegaly.    INTERPRETATION:  CLINICAL INFORMATION: Left ovarian cystic lesion on CT  LMP: CT 7/1  COMPARISON: CT same day  TECHNIQUE:   Endovaginal pelvic sonogram as per order. Transabdominal pelvic sonogram was also performed as part of our standard protocol. Color and Spectral Doppler was performed.    FINDINGS:  Uterus: Hysterectomy  Right ovary: Not visualized.   Left ovary: 3.5 x 4 x 3.5 cm. 3.3 cm unilocular cyst. No septations or vascularized mural nodules.  Fluid: None.    IMPRESSION:  3.3 cm simple left ovarian cyst. 6-12 months follow-up ultrasound is recommended. 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 She is currently receiving AC for incidentally discovered May Thurner syndrome, and has been ruled negative for NSTEMI. In view of her progressive needs, PT eval was done and discharge to subacute rehab was recommended. Patient has been started on Zoloft for depressed affect. She was able to tolerate a pureed/thickened honey consistency diet.    INTERVAL HPI/OVERNIGHT EVENTS:   Overnight the patient again reported an episode of severe left lower quadrant abdominal pain and was given IV Toradol with improvement. This morning she reports no pain in her abdomen and only some mild discomfort in her left upper leg and inguinal region. Patient was seen and examined by bedside; benign abdomen, non-tender. No LE swelling, erythema or exquisite tenderness to palpation. Good perfusion and distal pulses. Her mood remains improved.    REVIEW OF SYSTEMS:  CONSTITUTIONAL: No fever, weight loss, or fatigue  RESPIRATORY: No cough, wheezing, chills or hemoptysis; No shortness of breath  CARDIOVASCULAR: No chest pain, palpitations, dizziness, or leg swelling  GASTROINTESTINAL: No abdominal pain. No nausea, vomiting, or hematemesis; No diarrhea or constipation. No melena or hematochezia.  GENITOURINARY: No dysuria or hematuria, urinary frequency  MSK: + left upper thigh discomfort  NEUROLOGICAL: No headaches, + dementia, + chronic right hemiparesis + chronic loss of strength, no  tremors  SKIN: No itching, burning, rashes, or lesions       Vital Signs Last 24 Hrs  T(C): 36.3 (07 Jul 2020 05:29), Max: 36.7 (06 Jul 2020 14:23)  T(F): 97.4 (07 Jul 2020 05:29), Max: 98 (06 Jul 2020 14:23)  HR: 81 (07 Jul 2020 05:29) (70 - 81)  BP: 158/81 (07 Jul 2020 05:29) (130/68 - 158/81)  BP(mean): --  RR: 17 (07 Jul 2020 05:29) (16 - 17)  SpO2: 98% (07 Jul 2020 05:29) (97% - 98%)    PHYSICAL EXAMINATION:  GENERAL: NAD, well built with chronic stable facial droop  HEAD:  Atraumatic, Normocephalic  EYES:  conjunctiva and sclera clear  NECK: Supple, No JVD, Normal thyroid  CHEST/LUNG: Clear to auscultation. Clear to auscultation bilaterally; No rales, rhonchi, wheezing, or rubs  HEART: Regular rate and rhythm; No murmurs, rubs, or gallops  ABDOMEN: Soft, Nontender, Nondistended; Bowel sounds present all four quadrants  NERVOUS SYSTEM:  Alert & Oriented X2, no new neurological deficit  EXTREMITIES:  2+ Peripheral Pulses, No clubbing, cyanosis, or edema, (+) contractures on right lower extremity  SKIN: warm dry, intact                          11.5   4.32  )-----------( 233      ( 07 Jul 2020 06:10 )             36.0     07-07    141  |  105  |  13  ----------------------------<  107<H>  3.5   |  28  |  0.57    Ca    9.1      07 Jul 2020 06:10    TPro  6.1  /  Alb  3.1<L>  /  TBili  0.7  /  DBili  x   /  AST  32  /  ALT  39  /  AlkPhos  51  07-07      LIVER FUNCTIONS - ( 07 Jul 2020 06:10 )  Alb: 3.1 g/dL / Pro: 6.1 g/dL / ALK PHOS: 51 U/L / ALT: 39 U/L DA / AST: 32 U/L / GGT: x             CAPILLARY BLOOD GLUCOSE  Glucose, Serum: 107 mg/dL (07.07.20 @ 06:10)      RADIOLOGY & ADDITIONAL TESTS:    EXAM:  CT ABDOMEN AND PELVIS IC                        PROCEDURE DATE:  07/08/2020      INTERPRETATION:  CT of the abdomen and pelvis with IV contrast  Clinical Indication: left abdominal pain. History of left iliac vein thrombosis.    Impression:  Moderate amount of stool in the rectum. Colonic diverticulosis without evidence for diverticulitis. The appendix appears normal. No bowel obstruction or grossly thickened bowel wall.    Filling defects are again seen in the left common iliac vein and left internal iliac vein, compatible with deep vein thrombosis. Apparent external compression of the left common iliac vein by the left common iliac artery.    Stable 3.4 cm cystic lesion in the adnexal regions, likely representing a left ovarian cyst.          Xray Abdomen 1 View PORTABLE -Routine (07.07.20 @ 10:49)   EXAM:  XR ABDOMEN PORTABLE ROUTINE 1V                            PROCEDURE DATE:  07/07/2020    INTERPRETATION:  Abdominal x-ray    Indication: Abdominal pain.  Single supine AP view of the abdomen is acquired without a previous abdominal x-ray for comparison.  Impression: Surgical clips in the right upper quadrant abdomen.  Paucity of small bowel gas.  No gross evidence for organomegaly.    INTERPRETATION:  CLINICAL INFORMATION: Left ovarian cystic lesion on CT  LMP: CT 7/1  COMPARISON: CT same day  TECHNIQUE:   Endovaginal pelvic sonogram as per order. Transabdominal pelvic sonogram was also performed as part of our standard protocol. Color and Spectral Doppler was performed.    FINDINGS:  Uterus: Hysterectomy  Right ovary: Not visualized.   Left ovary: 3.5 x 4 x 3.5 cm. 3.3 cm unilocular cyst. No septations or vascularized mural nodules.  Fluid: None.    IMPRESSION:  3.3 cm simple left ovarian cyst. 6-12 months follow-up ultrasound is recommended.

## 2020-07-07 NOTE — DIETITIAN INITIAL EVALUATION ADULT. - OTHER INFO
Pt visited. Pt seen for LOS. Pt is alert and Verbal. Pt is confused. D/W PCA pt is Fed by staff. Pt eating Good. . Unable to obtained any information. Pt is on Dysphagia 3 soft Honey liquids . H/O CVA.  Pt may Benefit  from  SLP eval for diet appropriateness.

## 2020-07-07 NOTE — DIETITIAN INITIAL EVALUATION ADULT. - PROBLEM SELECTOR PLAN 1
Patient presented with episode of gastric pain and vomiting which resolved while admitted on ED   Abdominal CT scan yielded partial thrombosis of the left common and internal iliac vein. Patient was placed on heparin drip full dose which switched to full dose Lovenox later in afternoon  - vascular consult appreciated  -Doppler negative for LE DVTs  - cw full dose Lovenox  - patient on clear liquid  - advance diet to soft AM

## 2020-07-07 NOTE — PROGRESS NOTE ADULT - PROBLEM SELECTOR PLAN 4
- Started on Miralax  x3 days   - Senna 2 tablets HS  - added trial of simethicone on 07/07 for gas and discomfort  - abdominal XR notable only for paucity of gas in the small bowel and post-surgical clips in RUQ - Started on Miralax  x3 days   - Senna 2 tablets HS  - added trial of simethicone on 07/07 for gas and discomfort  - abdominal XR notable only for paucity of gas in the small bowel and post-surgical clips in RUQ  - diverticulosis on repeat CT

## 2020-07-07 NOTE — PROGRESS NOTE ADULT - ATTENDING COMMENTS
Patient is a 74y old  Female who presents with a chief complaint of Abdominal Pain and Vomiting (07 Jul 2020 13:11)    Patient was seen and examined at bedside today  Denies any abd pain     REVIEW OF SYSTEMS: denies fever, chills, SOB, palpitations, chest pain, abdominal pain, nausea, vomiting, diarrhea, constipation, dizziness    PHYSICAL EXAM:  GENERAL: NAD, well-groomed, well-developed  NERVOUS SYSTEM:  Alert & Oriented, Good concentration; no focal deficit   CHEST/LUNG: Clear to auscultation bilaterally; No rales, rhonchi, wheezing, or rubs  HEART: Regular rate and rhythm; No murmurs, rubs, or gallops  ABDOMEN: Soft, Nontender, Nondistended; Bowel sounds present  EXTREMITIES:  2+ Peripheral Pulses, No clubbing, cyanosis, or edema    LABS:                        11.5   4.32  )-----------( 233      ( 07 Jul 2020 06:10 )             36.0       141  |  105  |  13  ----------------------------<  107<H>  3.5   |  28  |  0.57    Assessment and plan:   1. 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 Simple cyst in Ovary   3. Recent COVID infection   4. H/O CVA  5. Constipation   6. Homocystinemia     Plan:  Cont Lovenox, can be discharged on NOAC   Homocysteine elevated, Dr Limon on board   Will need age appropriate screening for malignancy   Vascular surgery consult appreciated, follow up as outpatient  PT eval noted   Bowel regimen for constipation   CM and SW for safe disposition

## 2020-07-07 NOTE — PROGRESS NOTE ADULT - PROBLEM SELECTOR PLAN 1
Patient presented with episode of gastric pain and vomiting which resolved while admitted on ED   Abdominal CT scan yielded partial thrombosis of the left common and internal iliac vein. Patient is on therapeutic Lovenox to reduce the need for needlesticks.  -Doppler negative for LE DVTs  - vascular consult is appreciated, and they recommended AC and OP follow up in 2 months  -Heme/onc consult is appreciated,  - CA -125 normal, US vaginal shows ovarian cyst  - diet has been advanced from liquids to soft diet  - Patient is medically stable for discharge with eliquis 10mg BID for 7 days and 5mg BID later on  -Not a candidate for surgical intervention or stent placement  - per heme-onc : no need for further thrombophilia work up  - will f/u with patient's PCP to ensure age appropriate screenings have been performed Patient presented with episode of gastric pain and vomiting which resolved while admitted on ED   Abdominal CT scan yielded partial thrombosis of the left common and internal iliac vein. Patient is on therapeutic Lovenox to reduce the need for needle sticks.  -Doppler negative for LE DVTs  - vascular consult is appreciated, and they recommended AC and OP follow up in 2 months  -Heme/onc consult is appreciated,  - CA -125 normal, US vaginal shows ovarian cyst  - diet has been advanced from liquids to soft diet  - Patient is medically stable for discharge with eliquis 10mg BID for 7 days and 5mg BID later on  -Not a candidate for surgical intervention or stent placement  - per heme-onc : no need for further thrombophilia work up  - will f/u with patient's PCP to ensure age appropriate screenings have been performed Patient presented with episode of gastric pain and vomiting which resolved while admitted on ED   Abdominal CT scan yielded partial thrombosis of the left common and internal iliac vein. Patient is on therapeutic Lovenox to reduce the need for needle sticks.  -Doppler negative for LE DVTs  - vascular consult is appreciated, and they recommended AC and OP follow up in 2 months  -Heme/onc consult is appreciated,  - CA -125 normal, US vaginal shows ovarian cyst  - diet has been advanced from liquids to soft diet  - Patient is medically stable for discharge with eliquis 10mg BID for 7 days and 5mg BID later on  -Not a candidate for surgical intervention or stent placement  - per heme-onc : no need for further thrombophilia work up  - will f/u with patient's PCP to ensure age appropriate screenings have been performed  - repeat CT abdomen/pelvis: no improvement in left illiac vein clot and signs of stool with diverticulosis  - consulted vasc : believe that the pain is not due to the clot and do not recommend CT angio exam at this time. Doubt mesenteric ischemia as the pain is not prost-prandial and only overnight.  - Heme/onc following: would like a repeat vascular consult as the repeat CT shows no improvement in clot with a week of anticoagulation.  - f/u with vascular

## 2020-07-07 NOTE — DIETITIAN INITIAL EVALUATION ADULT. - PROBLEM SELECTOR PLAN 2
patient presented with elevated troponin , EKG NSR with nonspecific ST-T segment changes  Patient was on full dose heparin for new DVt switched to full dose lovenox  - trend troponin   -c/w full dose Lovenox  - f/u Echo  - telemetry  ****cardiology Dr Hand

## 2020-07-07 NOTE — CHART NOTE - NSCHARTNOTEFT_GEN_A_CORE
Patient was complaining of constant abdominal pain, described as pressure in nature in her Left lower quadrant. Tylenol was given around 10-11pm and helped somewhat. At 3am patient woke up again with the same pain, she mentions its "not out this world" but constant. Given her Liver enzymes are trending up, will give Motrin for now if pain persists.     Discussed with seniors.    Primary team to follow.

## 2020-07-07 NOTE — PROGRESS NOTE ADULT - ASSESSMENT
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 On evaluation patient is poor historian, AOX2 (self, place), forgetful. Patient currently denies generalized abdominal pain or nausea. On admission, uptrending Trops and EKG changes was s/o NSTEMI. Trops and EKG normalized, likely changes were due to demand ischemia. CT abdomen and pelvis demonstrated features of May Thurner syndrome for which AC has been started (initially full dose Heparin drip, then transitioned to apixaban).     Patient reported some abdominal pain overnight on 7/6 and was given Motrin. No abdominal pain currently. Abdominal XR imaging notable for paucity of gas in small bowel and RUQ post-surgical clips. Will trial with a dose of simethicone to relieve gas and discomfort. Need to contact patient's PCP to investigate if age appropriate screening such as mammography and colonoscopy have been performed. Per Heme/Onc - CA-19 negative, no need for further thrombophilia work up. Pending authorization to [facility] 74y F from home, lives with her son, bedridden for the past 3 months, with PMHx of Dementia , CVA with right sided hemiparesis and facial droop, EMS was called by grandson for abdominal pain and one episode of vomiting at 2am. On evaluation patient is poor historian, AOX2 (self, place), forgetful. Patient currently denies generalized abdominal pain or nausea. On admission, uptrending Trops and EKG changes was s/o NSTEMI. Trops and EKG normalized, likely changes were due to demand ischemia. CT abdomen and pelvis demonstrated features of May Thurner syndrome for which AC has been started (initially full dose Heparin drip, then transitioned to apixaban).     Patient reported some abdominal pain overnight on 7/6 and was given Motrin. No abdominal pain currently. Abdominal XR imaging notable for paucity of gas in small bowel and RUQ post-surgical clips. Will trial with a dose of simethicone to relieve gas and discomfort. Need to contact patient's PCP to investigate if age appropriate screening such as mammography and colonoscopy have been performed. Per Heme/Onc - CA-19 negative, no need for further thrombophilia work up. Pending authorization to [facility].     Patient continues to have episodes of pain the her left lower quadrant in the evenings. Repeat CT abdomen pelvis was ordered for r/o of progression of clot or other intra-abdominal etiology for the pain.

## 2020-07-07 NOTE — PROGRESS NOTE ADULT - PROBLEM SELECTOR PLAN 2
Patient presented with elevated troponin , EKG NSR with nonspecific ST-T segment changes s/o NSTEMI. Likely demand ischemia, NSTEMI ruled out  - troponin trended down  - c/w Eliquis 10mg BID  - Reviewed Echo normal EF 54 % (07/02)

## 2020-07-08 LAB
ALBUMIN SERPL ELPH-MCNC: 3.3 G/DL — LOW (ref 3.5–5)
ALP SERPL-CCNC: 54 U/L — SIGNIFICANT CHANGE UP (ref 40–120)
ALT FLD-CCNC: 41 U/L DA — SIGNIFICANT CHANGE UP (ref 10–60)
ANION GAP SERPL CALC-SCNC: 12 MMOL/L — SIGNIFICANT CHANGE UP (ref 5–17)
AST SERPL-CCNC: 26 U/L — SIGNIFICANT CHANGE UP (ref 10–40)
BILIRUB SERPL-MCNC: 0.8 MG/DL — SIGNIFICANT CHANGE UP (ref 0.2–1.2)
BUN SERPL-MCNC: 12 MG/DL — SIGNIFICANT CHANGE UP (ref 7–18)
CALCIUM SERPL-MCNC: 9.3 MG/DL — SIGNIFICANT CHANGE UP (ref 8.4–10.5)
CHLORIDE SERPL-SCNC: 105 MMOL/L — SIGNIFICANT CHANGE UP (ref 96–108)
CO2 SERPL-SCNC: 25 MMOL/L — SIGNIFICANT CHANGE UP (ref 22–31)
CREAT SERPL-MCNC: 0.69 MG/DL — SIGNIFICANT CHANGE UP (ref 0.5–1.3)
GLUCOSE BLDC GLUCOMTR-MCNC: 116 MG/DL — HIGH (ref 70–99)
GLUCOSE SERPL-MCNC: 127 MG/DL — HIGH (ref 70–99)
POTASSIUM SERPL-MCNC: 3.3 MMOL/L — LOW (ref 3.5–5.3)
POTASSIUM SERPL-SCNC: 3.3 MMOL/L — LOW (ref 3.5–5.3)
PROT SERPL-MCNC: 6.3 G/DL — SIGNIFICANT CHANGE UP (ref 6–8.3)
SODIUM SERPL-SCNC: 142 MMOL/L — SIGNIFICANT CHANGE UP (ref 135–145)

## 2020-07-08 PROCEDURE — 99233 SBSQ HOSP IP/OBS HIGH 50: CPT | Mod: GC

## 2020-07-08 PROCEDURE — 74177 CT ABD & PELVIS W/CONTRAST: CPT | Mod: 26

## 2020-07-08 RX ORDER — ACETAMINOPHEN 500 MG
1000 TABLET ORAL ONCE
Refills: 0 | Status: COMPLETED | OUTPATIENT
Start: 2020-07-08 | End: 2020-07-08

## 2020-07-08 RX ORDER — POLYETHYLENE GLYCOL 3350 17 G/17G
17 POWDER, FOR SOLUTION ORAL DAILY
Refills: 0 | Status: DISCONTINUED | OUTPATIENT
Start: 2020-07-08 | End: 2020-07-09

## 2020-07-08 RX ORDER — POTASSIUM CHLORIDE 20 MEQ
20 PACKET (EA) ORAL ONCE
Refills: 0 | Status: COMPLETED | OUTPATIENT
Start: 2020-07-08 | End: 2020-07-08

## 2020-07-08 RX ORDER — SODIUM CHLORIDE 9 MG/ML
1000 INJECTION INTRAMUSCULAR; INTRAVENOUS; SUBCUTANEOUS
Refills: 0 | Status: DISCONTINUED | OUTPATIENT
Start: 2020-07-08 | End: 2020-07-09

## 2020-07-08 RX ORDER — KETOROLAC TROMETHAMINE 30 MG/ML
15 SYRINGE (ML) INJECTION ONCE
Refills: 0 | Status: DISCONTINUED | OUTPATIENT
Start: 2020-07-08 | End: 2020-07-08

## 2020-07-08 RX ADMIN — Medication 0.12 MILLIGRAM(S): at 06:13

## 2020-07-08 RX ADMIN — Medication 10 MILLIGRAM(S): at 15:51

## 2020-07-08 RX ADMIN — Medication 1 MILLIGRAM(S): at 08:22

## 2020-07-08 RX ADMIN — SERTRALINE 25 MILLIGRAM(S): 25 TABLET, FILM COATED ORAL at 22:32

## 2020-07-08 RX ADMIN — SODIUM CHLORIDE 75 MILLILITER(S): 9 INJECTION INTRAMUSCULAR; INTRAVENOUS; SUBCUTANEOUS at 08:23

## 2020-07-08 RX ADMIN — Medication 400 MILLIGRAM(S): at 05:21

## 2020-07-08 RX ADMIN — Medication 25 MILLIGRAM(S): at 06:13

## 2020-07-08 RX ADMIN — Medication 25 MILLIGRAM(S): at 17:39

## 2020-07-08 RX ADMIN — APIXABAN 10 MILLIGRAM(S): 2.5 TABLET, FILM COATED ORAL at 06:13

## 2020-07-08 RX ADMIN — Medication 1 MILLIGRAM(S): at 17:39

## 2020-07-08 RX ADMIN — Medication 20 MILLIEQUIVALENT(S): at 17:38

## 2020-07-08 RX ADMIN — ATORVASTATIN CALCIUM 40 MILLIGRAM(S): 80 TABLET, FILM COATED ORAL at 22:32

## 2020-07-08 RX ADMIN — Medication 15 MILLIGRAM(S): at 06:04

## 2020-07-08 RX ADMIN — PANTOPRAZOLE SODIUM 40 MILLIGRAM(S): 20 TABLET, DELAYED RELEASE ORAL at 06:13

## 2020-07-08 RX ADMIN — Medication 81 MILLIGRAM(S): at 12:28

## 2020-07-08 RX ADMIN — APIXABAN 10 MILLIGRAM(S): 2.5 TABLET, FILM COATED ORAL at 17:39

## 2020-07-08 RX ADMIN — Medication 3 MILLIGRAM(S): at 22:33

## 2020-07-08 NOTE — PROGRESS NOTE ADULT - PROBLEM SELECTOR PROBLEM 4
Constipation, unspecified constipation type
Dementia
Constipation, unspecified constipation type

## 2020-07-08 NOTE — PROGRESS NOTE ADULT - PROBLEM SELECTOR PLAN 4
- Started on Miralax  x3 days   - Senna 2 tablets HS  - added trial of simethicone on 07/07 for gas and discomfort  - abdominal XR notable only for paucity of gas in the small bowel and post-surgical clips in RUQ  - diverticulosis on repeat CT

## 2020-07-08 NOTE — PROGRESS NOTE ADULT - PROBLEM SELECTOR PROBLEM 3
CVA, old, hemiparesis

## 2020-07-08 NOTE — PROGRESS NOTE ADULT - PROBLEM SELECTOR PLAN 5
AAOX1-2  Family contacted    Management / S W input for safe discharge
IMPROVE VTE Individual Risk Assessment    RISK                                                                Points  [  ] Previous VTE                                                  3  [  ] Thrombophilia                                               2  [  ] Lower limb paralysis                                      2        (unable to hold up >15 seconds)    [  ] Current Cancer                                              2         (within 6 months)  [x  ] Immobilization > 24 hrs                                1  [  ] ICU/CCU stay > 24 hours                              1  [  x] Age > 60                                                      1  IMPROVE VTE Score _________
AAOX1-2  - Family contacted   - Case Management / Social Work input for safe discharge  - waiting for acceptance at facility and authorization
AAOX1-2  Family contacted    Management / S W input for safe discharge  waiting for acceptance at facility and auth
AAOX1-2  - Family contacted   - Case Management / Social Work input for safe discharge  - waiting for acceptance at facility and authorization

## 2020-07-08 NOTE — PROGRESS NOTE ADULT - SUBJECTIVE AND OBJECTIVE BOX
had pain at left side of abd last night  fine today  no fever, N/V/D    MEDICATIONS  (STANDING):  apixaban 10 milliGRAM(s) Oral every 12 hours  aspirin  chewable 81 milliGRAM(s) Oral daily  atorvastatin 40 milliGRAM(s) Oral at bedtime  digoxin     Tablet 0.125 milliGRAM(s) Oral daily  ergocalciferol 15224 Unit(s) Oral <User Schedule>  folic acid 1 milliGRAM(s) Oral <User Schedule>  melatonin 3 milliGRAM(s) Oral at bedtime  metoprolol tartrate 25 milliGRAM(s) Oral two times a day  ondansetron Injectable 4 milliGRAM(s) IV Push once  pantoprazole    Tablet 40 milliGRAM(s) Oral before breakfast  senna 2 Tablet(s) Oral at bedtime  sertraline 25 milliGRAM(s) Oral at bedtime  sodium chloride 0.9%. 1000 milliLiter(s) (75 mL/Hr) IV Continuous <Continuous>    MEDICATIONS  (PRN):  acetaminophen   Tablet .. 650 milliGRAM(s) Oral every 4 hours PRN Mild Pain (1 - 3)  polyethylene glycol 3350 17 Gram(s) Oral daily PRN Constipation      Allergies    No Known Allergies    Intolerances        Vital Signs Last 24 Hrs  T(C): 37.1 (08 Jul 2020 13:23), Max: 37.1 (08 Jul 2020 13:23)  T(F): 98.7 (08 Jul 2020 13:23), Max: 98.7 (08 Jul 2020 13:23)  HR: 99 (08 Jul 2020 13:23) (67 - 105)  BP: 156/73 (08 Jul 2020 13:23) (140/90 - 156/73)  BP(mean): --  RR: 17 (08 Jul 2020 13:23) (17 - 18)  SpO2: 99% (08 Jul 2020 13:23) (98% - 99%)    PHYSICAL EXAM  General: adult in NAD  HEENT: clear oropharynx, anicteric sclera, pink conjunctiva  Neck: supple  CV: normal S1/S2 with no murmur rubs or gallops  Lungs: positive air movement b/l ant lungs,clear to auscultation, no wheezes, no rales  Abdomen: soft non-tender non-distended, no hepatosplenomegaly  Ext: no clubbing cyanosis or edema  Skin: no rashes and no petechiae  Neuro: alert and oriented X 4, no focal deficits  LABS:                          11.5   4.32  )-----------( 233      ( 07 Jul 2020 06:10 )             36.0         Mean Cell Volume : 86.7 fl  Mean Cell Hemoglobin : 27.7 pg  Mean Cell Hemoglobin Concentration : 31.9 gm/dL  Auto Neutrophil # : x  Auto Lymphocyte # : x  Auto Monocyte # : x  Auto Eosinophil # : x  Auto Basophil # : x  Auto Neutrophil % : x  Auto Lymphocyte % : x  Auto Monocyte % : x  Auto Eosinophil % : x  Auto Basophil % : x    Serial CBC  Hematocrit 36.0  Hemoglobin 11.5  Plat 233  RBC 4.15  WBC 4.32  Serial CBC  Hematocrit 41.6  Hemoglobin 12.9  Plat 246  RBC 4.70  WBC 5.36  Serial CBC  Hematocrit 37.5  Hemoglobin 11.8  Plat 221  RBC 4.24  WBC 3.86    07-08    142  |  105  |  12  ----------------------------<  127<H>  3.3<L>   |  25  |  0.69    Ca    9.3      08 Jul 2020 07:00    TPro  6.3  /  Alb  3.3<L>  /  TBili  0.8  /  DBili  x   /  AST  26  /  ALT  41  /  AlkPhos  54  07-08          Vitamin B12, Serum: 355 pg/mL (07-03 @ 14:12)  Folate, Serum: 3.4 ng/mL (07-03 @ 14:12)            BLOOD SMEAR INTERPRETATION:       RADIOLOGY & ADDITIONAL STUDIES:  < from: CT Abdomen and Pelvis w/ IV Cont (07.08.20 @ 11:59) >  INTERPRETATION:  CT of the abdomen and pelvis with IV contrast    Clinical Indication: left abdominal pain. History of left iliac vein thrombosis.    Technique: Axial multidetector CT images of the abdomen and pelvis are acquired following the administration of IV contrast (90 cc Omnipaque-350 administered, 10 cc discarded).    Comparison: 7/1/2020.    Findings: Limited sections through the lung bases demonstrate mild bilateral atelectasis.    The examination is limited by streaky artifact. The liver, gallbladder, common bile duct, pancreas, spleen, and adrenals appear grossly unremarkable. Bilateral renal cysts measuring up to 2.2 cm on the right. Tiny hypodense lesion in the right kidney, too small to characterize. No evidence for a ureteral calculus. No hydronephrosis.    Moderate amount of stool in the rectum. Colonic diverticulosis without evidence for diverticulitis. The appendix appears normal. No bowel obstruction or grossly thickened bowel wall. Apparent hemostat clip is again noted in the gastric antrum.    No evidence for free air, ascites, or enlarged lymph node.    Filling defects are again seen in the left common iliac vein and left internal iliac vein, compatible with deep vein thrombosis. Apparent external compression of the left common iliac vein by the left common iliac artery.    The urinary bladder is underdistended; it appears grossly unremarkable. The uterus is not visualized, probably surgically absent. There is a stable 3.4 cm cystic lesion in the adnexal regions, likely representing a left ovarian cyst.    Impression:    Moderate amount of stool in the rectum. Colonic diverticulosis without evidence for diverticulitis. The appendix appears normal. No bowel obstruction or grossly thickened bowel wall.    Filling defects are again seen in the left common iliac vein and left internal iliac vein, compatible with deep vein thrombosis. Apparent external compression of the left common iliac vein by the left common iliac artery.    Stable 3.4 cm cystic lesion in the adnexal regions, likely representing a left ovarian cyst.      < end of copied text >

## 2020-07-08 NOTE — PROGRESS NOTE ADULT - PROBLEM SELECTOR PROBLEM 1
Thrombosis of vein

## 2020-07-08 NOTE — PROGRESS NOTE ADULT - ASSESSMENT
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 On evaluation patient is poor historian, AOX2 (self, place), forgetful. Patient currently denies generalized abdominal pain or nausea. On admission, uptrending Trops and EKG changes was s/o NSTEMI. Trops and EKG normalized, likely changes were due to demand ischemia. CT abdomen and pelvis demonstrated features of May Thurner syndrome for which AC has been started (initially full dose Heparin drip, then transitioned to apixaban).     Patient reported some abdominal pain overnight on 7/6 and was given Motrin. No abdominal pain currently. Abdominal XR imaging notable for paucity of gas in small bowel and RUQ post-surgical clips. Will trial with a dose of simethicone to relieve gas and discomfort. Need to contact patient's PCP to investigate if age appropriate screening such as mammography and colonoscopy have been performed. Per Heme/Onc - CA-19 negative, no need for further thrombophilia work up. Pending authorization to [facility].     Patient continues to have episodes of pain the her left lower quadrant in the evenings.  Repeat CT abdomen pelvis was ordered for r/o of progression of clot or other intra-abdominal etiology for the pain.

## 2020-07-08 NOTE — CHART NOTE - NSCHARTNOTEFT_GEN_A_CORE
MICHELLE Rivera notified us that patient was in a lot of pain. Upon examination patient is restless, with flexed legs, rolling around in the bed in excruciating pain, pointing to her left epigastric and left inguinal quadrant. On palpation abdomen is soft, non distended, and patient does not react to deep palpation. She denies any n/v/d. It's noted that patient is not receiving any pain medicine during the day, but has been complaining of pain during the night. Please consider a standing order for pain.     Primary team to follow. MICHELLE Rivera notified us that patient was in a lot of pain. Upon examination patient is restless, with flexed legs, rolling around in the bed in excruciating pain, pointing to her left epigastric and left inguinal quadrant. On palpation abdomen is soft, non distended, and patient does not react to deep palpation. She denies any n/v/d. We believe the pain is originating secondary to the Common Iliac Vein Thrombosis.  It's noted that patient is not receiving any pain medicine during the day, but has been complaining of pain during the night. Please consider a routine order for pain.     Primary team to follow.    Addendum:  ~30 min after administration patients pain had not subside. After discussion with senior residents, it was decided the patient would benefit from another pain medication. Toradol 15mg IV push was given. MICHELLE Rivera notified us that patient was in a lot of pain. Upon examination patient is restless, with flexed legs, rolling around in the bed in excruciating pain, pointing to her left epigastric and left inguinal quadrant. On palpation abdomen is soft, non distended, and patient does not react to deep palpation. She denies any n/v/d. We believe the pain is originating secondary to the Common Iliac Vein Thrombosis.  It's noted that patient is not receiving any PRN pain medicine during the day, but has been complaining of pain during the night. Patient might benefit of Pain Management consult in the am.     Primary team to follow.    Addendum:  ~30 min after administration patients pain had not subside. After discussion with senior residents, it was decided the patient would benefit from another pain medication. Toradol 15mg IV push was given.

## 2020-07-08 NOTE — PROGRESS NOTE ADULT - PROBLEM SELECTOR PLAN 1
Patient presented with episode of gastric pain and vomiting which resolved while admitted on ED   Abdominal CT scan yielded partial thrombosis of the left common and internal iliac vein. Patient is on therapeutic Lovenox to reduce the need for needle sticks.  -Doppler negative for LE DVTs  - vascular consult is appreciated, and they recommended AC and OP follow up in 2 months  -Heme/onc consult is appreciated,  - CA -125 normal, US vaginal shows ovarian cyst  - diet has been advanced from liquids to soft diet  - Patient is medically stable for discharge with eliquis 10mg BID for 7 days and 5mg BID later on  -Not a candidate for surgical intervention or stent placement  - per heme-onc : no need for further thrombophilia work up  - will f/u with patient's PCP to ensure age appropriate screenings have been performed  - repeat CT abdomen/pelvis: no improvement in left illiac vein clot and signs of stool with diverticulosis  - consulted vasc : believe that the pain is not due to the clot and do not recommend CT angio exam at this time. Doubt mesenteric ischemia as the pain is not prost-prandial and only overnight.  - Heme/onc following: would like a repeat vascular consult as the repeat CT shows no improvement in clot with a week of anticoagulation.  - f/u with vascular

## 2020-07-08 NOTE — PROGRESS NOTE ADULT - ATTENDING COMMENTS
Patient is a 74y old  Female who presents with a chief complaint of Abdominal Pain and Vomiting (08 Jul 2020 16:15)    Patient was seen and examined at bedside today   used [id number]  Overnight patient was complaining of abd pain, when asked this morning patient denies any pain at this moment, reports had experienced this pain at home before, which will resolve by itself     INTERVAL HPI/OVERNIGHT EVENTS:  T(C): 37.1 (07-08-20 @ 13:23), Max: 37.1 (07-08-20 @ 13:23)  HR: 99 (07-08-20 @ 13:23) (67 - 105)  BP: 156/73 (07-08-20 @ 13:23) (140/90 - 156/73)  RR: 17 (07-08-20 @ 13:23) (17 - 18)  SpO2: 99% (07-08-20 @ 13:23) (98% - 99%)  Wt(kg): --  I&O's Summary      REVIEW OF SYSTEMS: denies fever, chills, SOB, palpitations, chest pain, abdominal pain, nausea, vomiting, diarrhea, constipation, dizziness      PHYSICAL EXAM:  GENERAL: NAD  NERVOUS SYSTEM:  Alert & Oriented X2, Good concentration; right sided residual weakness from CVA  CHEST/LUNG: Clear to auscultation bilaterally; No rales, rhonchi, wheezing, or rubs  HEART: Regular rate and rhythm; No murmurs, rubs, or gallops  ABDOMEN: Soft, Nontender, Nondistended; Bowel sounds present  EXTREMITIES:  2+ Peripheral Pulses, No clubbing, cyanosis, or edema  SKIN: No rashes or lesions  LABS:                        11.5   4.32  )-----------( 233      ( 07 Jul 2020 06:10 )             36.0     142  |  105  |  12  ----------------------------<  127<H>  3.3<L>   |  25  |  0.69    Assessment and plan:  1. Patient is a 74y old  Female who presents with a chief complaint of Abdominal Pain and Vomiting (08 Jul 2020 16:15)    Patient was seen and examined at bedside today   used [id number]  Overnight patient was complaining of abd pain, when asked this morning patient denies any pain at this moment, reports had experienced this pain at home before, which will resolve by itself     INTERVAL HPI/OVERNIGHT EVENTS:  T(C): 37.1 (07-08-20 @ 13:23), Max: 37.1 (07-08-20 @ 13:23)  HR: 99 (07-08-20 @ 13:23) (67 - 105)  BP: 156/73 (07-08-20 @ 13:23) (140/90 - 156/73)  RR: 17 (07-08-20 @ 13:23) (17 - 18)  SpO2: 99% (07-08-20 @ 13:23) (98% - 99%)  Wt(kg): --  I&O's Summary      REVIEW OF SYSTEMS: denies fever, chills, SOB, palpitations, chest pain, abdominal pain, nausea, vomiting, diarrhea, constipation, dizziness      PHYSICAL EXAM:  GENERAL: NAD  NERVOUS SYSTEM:  Alert & Oriented X2, Good concentration; right sided residual weakness from CVA  CHEST/LUNG: Clear to auscultation bilaterally; No rales, rhonchi, wheezing, or rubs  HEART: Regular rate and rhythm; No murmurs, rubs, or gallops  ABDOMEN: Soft, Nontender, Nondistended; Bowel sounds present  EXTREMITIES:  2+ Peripheral Pulses, No clubbing, cyanosis, or edema  SKIN: No rashes or lesions  LABS:                        11.5   4.32  )-----------( 233      ( 07 Jul 2020 06:10 )             36.0     142  |  105  |  12  ----------------------------<  127<H>  3.3<L>   |  25  |  0.69    Assessment and plan:  1. 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 NSTEMI   3. CVA with RSH   4. Dementia   5. Constipation   6. Abd pain   7. Possible Paroxysmal Afib (on digoxin and metoprolol)  8. Simple ovarian cyst     Plan:   Repeat CT abd did not show any new intraabdominal pathology,   Abd pain not post prandial  Cont bowel regimen  Cont Lovenox, will discharge on NOAC   Cont Aspirin and statin   Will need outpatient follow up for Ovarian cyst   CM on board for [facility] placement

## 2020-07-08 NOTE — PROGRESS NOTE ADULT - ASSESSMENT
· Assessment		  74 year old lady presented with N/V and abdominal pain.. CT showed a partial thrombus at left common iliac vein.    Problem/Recommendation - 1:  Problem: Thrombosis of vein. Recommendation: May-Thurner syndrome  she had covid infection recently  the clot can be from the pressure caused by common iliac artery but the hypercoagulability caused by COVID probably play a role  she can be on NOAC  vascular surgeon does not think surgical intervention is necessary  but I think putting in a stent should be considered.  otherwise she needs to be on AC for the rest of her life.  there is no need to do thrombophilia w/u.  the clot still present after one week of AC  the clot from May-Thurner syndrome does not respond to AC well  need vascular to see her again    Problem/Recommendation - 2:  ·  Problem: Ovarian cyst.  Recommendation: needs to be investigated  transvaginal US showed a simple cyst   ca 125. is normal  follow up in 6 m

## 2020-07-08 NOTE — PROGRESS NOTE ADULT - SUBJECTIVE AND OBJECTIVE BOX
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 She is currently receiving AC for incidentally discovered May Thurner syndrome, and has been ruled negative for NSTEMI. In view of her progressive needs, PT eval was done and discharge to subacute rehab was recommended. Patient has been started on Zoloft for depressed affect. She was able to tolerate a pureed/thickened honey consistency diet.      INTERVAL HPI/OVERNIGHT EVENTS:   Patient had another event of left inguinal and left epigastric abdominal pain for which the night team gave a dose of IV Toradol which improved the pain. Patient assessed in AM and is without pain. She described her pain as located in her left thigh in the bone and muscle region. No tenderness in abdomen to palpation. Denies any pain. Not associated with meals. Pain is concerning enough to warrant repeat CT abdomen/pelvis.       REVIEW OF SYSTEMS:  CONSTITUTIONAL: No fever, weight loss, or fatigue  RESPIRATORY: No cough, wheezing, chills or hemoptysis; No shortness of breath  CARDIOVASCULAR: No chest pain, palpitations, dizziness, or leg swelling  GASTROINTESTINAL: No abdominal pain. No nausea, vomiting, or hematemesis; No diarrhea or constipation. No melena or hematochezia.  GENITOURINARY: No dysuria or hematuria, urinary frequency  MSK: + left upper thigh discomfort  NEUROLOGICAL: No headaches, + dementia, + chronic right hemiparesis + chronic loss of strength, no  tremors  SKIN: No itching, burning, rashes, or lesions     Vital Signs Last 24 Hrs  T(C): 37.1 (08 Jul 2020 13:23), Max: 37.1 (08 Jul 2020 13:23)  T(F): 98.7 (08 Jul 2020 13:23), Max: 98.7 (08 Jul 2020 13:23)  HR: 99 (08 Jul 2020 13:23) (67 - 105)  BP: 156/73 (08 Jul 2020 13:23) (140/90 - 156/73)  BP(mean): --  RR: 17 (08 Jul 2020 13:23) (17 - 18)  SpO2: 99% (08 Jul 2020 13:23) (98% - 99%)    PHYSICAL EXAMINATION:  GENERAL: NAD, well built with chronic stable facial droop  HEAD:  Atraumatic, Normocephalic  EYES:  conjunctiva and sclera clear  NECK: Supple, No JVD, Normal thyroid  CHEST/LUNG: Clear to auscultation. Clear to auscultation bilaterally; No rales, rhonchi, wheezing, or rubs  HEART: Regular rate and rhythm; No murmurs, rubs, or gallops  ABDOMEN: Soft, Nontender, Nondistended; Bowel sounds present all four quadrants  NERVOUS SYSTEM:  Alert & Oriented X2, no new neurological deficit  EXTREMITIES:  2+ Peripheral Pulses, No clubbing, cyanosis, or edema, (+) contractures on right lower extremity  SKIN: warm dry, intact                          11.5   4.32  )-----------( 233      ( 07 Jul 2020 06:10 )             36.0     07-08    142  |  105  |  12  ----------------------------<  127<H>  3.3<L>   |  25  |  0.69    Ca    9.3      08 Jul 2020 07:00    TPro  6.3  /  Alb  3.3<L>  /  TBili  0.8  /  DBili  x   /  AST  26  /  ALT  41  /  AlkPhos  54  07-08    LIVER FUNCTIONS - ( 08 Jul 2020 07:00 )  Alb: 3.3 g/dL / Pro: 6.3 g/dL / ALK PHOS: 54 U/L / ALT: 41 U/L DA / AST: 26 U/L / GGT: x                   CAPILLARY BLOOD GLUCOSE      RADIOLOGY & ADDITIONAL TESTS: PGY-1 Progress Note discussed with attending    PAGER #: [[phone]] TILL 5:00 PM  PLEASE CONTACT ON CALL TEAM:   - On Call Team (Please refer to Lucy) FROM 5:00 PM - 8:30PM  - Nightfloat Team FROM 8:30 -7:30 AM    CHIEF COMPLAINT & BRIEF HOSPITAL COURSE:  75 y/o female with PMH of 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 She is currently receiving AC for incidentally discovered May Thurner syndrome, and has been ruled negative for NSTEMI. In view of her progressive needs, PT eval was done and discharge to subacute rehab was recommended. Patient has been started on Zoloft for depressed affect. She was able to tolerate a pureed/thickened honey consistency diet.      INTERVAL HPI/OVERNIGHT EVENTS:   Patient had another event of left inguinal and left epigastric abdominal pain for which the night team gave a dose of IV Toradol which improved the pain. Patient assessed in AM and is without pain. She described her pain as located in her left thigh in the bone and muscle region. No tenderness in abdomen to palpation. Denies any pain. Not associated with meals. Pain is concerning enough to warrant repeat CT abdomen/pelvis.       REVIEW OF SYSTEMS:  CONSTITUTIONAL: No fever, weight loss, or fatigue  RESPIRATORY: No cough, wheezing, chills or hemoptysis; No shortness of breath  CARDIOVASCULAR: No chest pain, palpitations, dizziness, or leg swelling  GASTROINTESTINAL: No abdominal pain. No nausea, vomiting, or hematemesis; No diarrhea or constipation. No melena or hematochezia.  GENITOURINARY: No dysuria or hematuria, urinary frequency  MSK: + left upper thigh discomfort  NEUROLOGICAL: No headaches, + dementia, + chronic right hemiparesis + chronic loss of strength, no  tremors  SKIN: No itching, burning, rashes, or lesions     Vital Signs Last 24 Hrs  T(C): 37.1 (08 Jul 2020 13:23), Max: 37.1 (08 Jul 2020 13:23)  T(F): 98.7 (08 Jul 2020 13:23), Max: 98.7 (08 Jul 2020 13:23)  HR: 99 (08 Jul 2020 13:23) (67 - 105)  BP: 156/73 (08 Jul 2020 13:23) (140/90 - 156/73)  BP(mean): --  RR: 17 (08 Jul 2020 13:23) (17 - 18)  SpO2: 99% (08 Jul 2020 13:23) (98% - 99%)    PHYSICAL EXAMINATION:  GENERAL: NAD, well built with chronic stable facial droop  HEAD:  Atraumatic, Normocephalic  EYES:  conjunctiva and sclera clear  NECK: Supple, No JVD, Normal thyroid  CHEST/LUNG: Clear to auscultation. Clear to auscultation bilaterally; No rales, rhonchi, wheezing, or rubs  HEART: Regular rate and rhythm; No murmurs, rubs, or gallops  ABDOMEN: Soft, Nontender, Nondistended; Bowel sounds present all four quadrants  NERVOUS SYSTEM:  Alert & Oriented X2, no new neurological deficit  EXTREMITIES:  2+ Peripheral Pulses, No clubbing, cyanosis, or edema, (+) contractures on right lower extremity  SKIN: warm dry, intact                          11.5   4.32  )-----------( 233      ( 07 Jul 2020 06:10 )             36.0     07-08    142  |  105  |  12  ----------------------------<  127<H>  3.3<L>   |  25  |  0.69    Ca    9.3      08 Jul 2020 07:00    TPro  6.3  /  Alb  3.3<L>  /  TBili  0.8  /  DBili  x   /  AST  26  /  ALT  41  /  AlkPhos  54  07-08    LIVER FUNCTIONS - ( 08 Jul 2020 07:00 )  Alb: 3.3 g/dL / Pro: 6.3 g/dL / ALK PHOS: 54 U/L / ALT: 41 U/L DA / AST: 26 U/L / GGT: x             CAPILLARY BLOOD GLUCOSE  POCT Blood Glucose.: 116 mg/dL (07.08.20 @ 12:09)    RADIOLOGY & ADDITIONAL TESTS:    EXAM:  CT ABDOMEN AND PELVIS IC                        PROCEDURE DATE:  07/08/2020    INTERPRETATION:  CT of the abdomen and pelvis with IV contrast    Clinical Indication: left abdominal pain. History of left iliac vein thrombosis.    Technique: Axial multidetector CT images of the abdomen and pelvis are acquired following the administration of IV contrast (90 cc Omnipaque-350 administered, 10 cc discarded).    Comparison: 7/1/2020.    Findings: Limited sections through the lung bases demonstrate mild bilateral atelectasis.    The examination is limited by streaky artifact. The liver, gallbladder, common bile duct, pancreas, spleen, and adrenals appear grossly unremarkable. Bilateral renal cysts measuring up to 2.2 cm on the right. Tiny hypodense lesion in the right kidney, too small to characterize. No evidence for a ureteral calculus. No hydronephrosis.    Moderate amount of stool in the rectum. Colonic diverticulosis without evidence for diverticulitis. The appendix appears normal. No bowel obstruction or grossly thickened bowel wall. Apparent hemostat clip is again noted in the gastric antrum.    No evidence for free air, ascites, or enlarged lymph node.    Filling defects are again seen in the left common iliac vein and left internal iliac vein, compatible with deep vein thrombosis. Apparent external compression of the left common iliac vein by the left common iliac artery.    The urinary bladder is underdistended; it appears grossly unremarkable. The uterus is not visualized, probably surgically absent. There is a stable 3.4 cm cystic lesion in the adnexal regions, likely representing a left ovarian cyst.    Impression:    Moderate amount of stool in the rectum. Colonic diverticulosis without evidence for diverticulitis. The appendix appears normal. No bowel obstruction or grossly thickened bowel wall.    Filling defects are again seen in the left common iliac vein and left internal iliac vein, compatible with deep vein thrombosis. Apparent external compression of the left common iliac vein by the left common iliac artery.    Stable 3.4 cm cystic lesion in the adnexal regions, likely representing a left ovarian cyst.

## 2020-07-08 NOTE — PROGRESS NOTE ADULT - REASON FOR ADMISSION
Abdominal Pain and Vomiting

## 2020-07-09 ENCOUNTER — TRANSCRIPTION ENCOUNTER (OUTPATIENT)
Age: 75
End: 2020-07-09

## 2020-07-09 VITALS
RESPIRATION RATE: 17 BRPM | TEMPERATURE: 98 F | OXYGEN SATURATION: 98 % | HEART RATE: 83 BPM | SYSTOLIC BLOOD PRESSURE: 148 MMHG | DIASTOLIC BLOOD PRESSURE: 68 MMHG

## 2020-07-09 LAB
ANION GAP SERPL CALC-SCNC: 8 MMOL/L — SIGNIFICANT CHANGE UP (ref 5–17)
BUN SERPL-MCNC: 13 MG/DL — SIGNIFICANT CHANGE UP (ref 7–18)
CALCIUM SERPL-MCNC: 9.1 MG/DL — SIGNIFICANT CHANGE UP (ref 8.4–10.5)
CHLORIDE SERPL-SCNC: 107 MMOL/L — SIGNIFICANT CHANGE UP (ref 96–108)
CO2 SERPL-SCNC: 27 MMOL/L — SIGNIFICANT CHANGE UP (ref 22–31)
CREAT SERPL-MCNC: 0.52 MG/DL — SIGNIFICANT CHANGE UP (ref 0.5–1.3)
GLUCOSE SERPL-MCNC: 88 MG/DL — SIGNIFICANT CHANGE UP (ref 70–99)
HCT VFR BLD CALC: 36.2 % — SIGNIFICANT CHANGE UP (ref 34.5–45)
HGB BLD-MCNC: 11.5 G/DL — SIGNIFICANT CHANGE UP (ref 11.5–15.5)
MCHC RBC-ENTMCNC: 27.7 PG — SIGNIFICANT CHANGE UP (ref 27–34)
MCHC RBC-ENTMCNC: 31.8 GM/DL — LOW (ref 32–36)
MCV RBC AUTO: 87.2 FL — SIGNIFICANT CHANGE UP (ref 80–100)
NRBC # BLD: 0 /100 WBCS — SIGNIFICANT CHANGE UP (ref 0–0)
PLATELET # BLD AUTO: 220 K/UL — SIGNIFICANT CHANGE UP (ref 150–400)
POTASSIUM SERPL-MCNC: 3.6 MMOL/L — SIGNIFICANT CHANGE UP (ref 3.5–5.3)
POTASSIUM SERPL-SCNC: 3.6 MMOL/L — SIGNIFICANT CHANGE UP (ref 3.5–5.3)
RBC # BLD: 4.15 M/UL — SIGNIFICANT CHANGE UP (ref 3.8–5.2)
RBC # FLD: 14.1 % — SIGNIFICANT CHANGE UP (ref 10.3–14.5)
SARS-COV-2 RNA SPEC QL NAA+PROBE: SIGNIFICANT CHANGE UP
SODIUM SERPL-SCNC: 142 MMOL/L — SIGNIFICANT CHANGE UP (ref 135–145)
WBC # BLD: 4.05 K/UL — SIGNIFICANT CHANGE UP (ref 3.8–10.5)
WBC # FLD AUTO: 4.05 K/UL — SIGNIFICANT CHANGE UP (ref 3.8–10.5)

## 2020-07-09 PROCEDURE — 85730 THROMBOPLASTIN TIME PARTIAL: CPT

## 2020-07-09 PROCEDURE — 84484 ASSAY OF TROPONIN QUANT: CPT

## 2020-07-09 PROCEDURE — 83090 ASSAY OF HOMOCYSTEINE: CPT

## 2020-07-09 PROCEDURE — 93005 ELECTROCARDIOGRAM TRACING: CPT

## 2020-07-09 PROCEDURE — 71045 X-RAY EXAM CHEST 1 VIEW: CPT

## 2020-07-09 PROCEDURE — 86304 IMMUNOASSAY TUMOR CA 125: CPT

## 2020-07-09 PROCEDURE — 82746 ASSAY OF FOLIC ACID SERUM: CPT

## 2020-07-09 PROCEDURE — 93970 EXTREMITY STUDY: CPT

## 2020-07-09 PROCEDURE — 82607 VITAMIN B-12: CPT

## 2020-07-09 PROCEDURE — 74177 CT ABD & PELVIS W/CONTRAST: CPT

## 2020-07-09 PROCEDURE — 84100 ASSAY OF PHOSPHORUS: CPT

## 2020-07-09 PROCEDURE — U0003: CPT

## 2020-07-09 PROCEDURE — 80053 COMPREHEN METABOLIC PANEL: CPT

## 2020-07-09 PROCEDURE — 76830 TRANSVAGINAL US NON-OB: CPT

## 2020-07-09 PROCEDURE — 97162 PT EVAL MOD COMPLEX 30 MIN: CPT

## 2020-07-09 PROCEDURE — 84443 ASSAY THYROID STIM HORMONE: CPT

## 2020-07-09 PROCEDURE — 76856 US EXAM PELVIC COMPLETE: CPT

## 2020-07-09 PROCEDURE — 82550 ASSAY OF CK (CPK): CPT

## 2020-07-09 PROCEDURE — 80048 BASIC METABOLIC PNL TOTAL CA: CPT

## 2020-07-09 PROCEDURE — 86803 HEPATITIS C AB TEST: CPT

## 2020-07-09 PROCEDURE — 82553 CREATINE MB FRACTION: CPT

## 2020-07-09 PROCEDURE — 97110 THERAPEUTIC EXERCISES: CPT

## 2020-07-09 PROCEDURE — 99239 HOSP IP/OBS DSCHRG MGMT >30: CPT | Mod: GC

## 2020-07-09 PROCEDURE — 85027 COMPLETE CBC AUTOMATED: CPT

## 2020-07-09 PROCEDURE — 83921 ORGANIC ACID SINGLE QUANT: CPT

## 2020-07-09 PROCEDURE — 86769 SARS-COV-2 COVID-19 ANTIBODY: CPT

## 2020-07-09 PROCEDURE — 83036 HEMOGLOBIN GLYCOSYLATED A1C: CPT

## 2020-07-09 PROCEDURE — 99285 EMERGENCY DEPT VISIT HI MDM: CPT | Mod: 25

## 2020-07-09 PROCEDURE — 74018 RADEX ABDOMEN 1 VIEW: CPT

## 2020-07-09 PROCEDURE — 93306 TTE W/DOPPLER COMPLETE: CPT

## 2020-07-09 PROCEDURE — 83735 ASSAY OF MAGNESIUM: CPT

## 2020-07-09 PROCEDURE — 96374 THER/PROPH/DIAG INJ IV PUSH: CPT

## 2020-07-09 PROCEDURE — 82306 VITAMIN D 25 HYDROXY: CPT

## 2020-07-09 PROCEDURE — 83690 ASSAY OF LIPASE: CPT

## 2020-07-09 PROCEDURE — 36415 COLL VENOUS BLD VENIPUNCTURE: CPT

## 2020-07-09 PROCEDURE — 82962 GLUCOSE BLOOD TEST: CPT

## 2020-07-09 PROCEDURE — 85610 PROTHROMBIN TIME: CPT

## 2020-07-09 RX ORDER — APIXABAN 2.5 MG/1
1 TABLET, FILM COATED ORAL
Qty: 60 | Refills: 0
Start: 2020-07-09 | End: 2020-08-07

## 2020-07-09 RX ORDER — POLYETHYLENE GLYCOL 3350 17 G/17G
17 POWDER, FOR SOLUTION ORAL
Qty: 0 | Refills: 0 | DISCHARGE
Start: 2020-07-09

## 2020-07-09 RX ORDER — SENNA PLUS 8.6 MG/1
2 TABLET ORAL
Qty: 0 | Refills: 0 | DISCHARGE
Start: 2020-07-09

## 2020-07-09 RX ADMIN — APIXABAN 10 MILLIGRAM(S): 2.5 TABLET, FILM COATED ORAL at 05:48

## 2020-07-09 RX ADMIN — PANTOPRAZOLE SODIUM 40 MILLIGRAM(S): 20 TABLET, DELAYED RELEASE ORAL at 05:49

## 2020-07-09 RX ADMIN — Medication 1 MILLIGRAM(S): at 10:16

## 2020-07-09 RX ADMIN — Medication 25 MILLIGRAM(S): at 05:49

## 2020-07-09 RX ADMIN — Medication 0.12 MILLIGRAM(S): at 05:48

## 2020-07-09 NOTE — DISCHARGE NOTE NURSING/CASE MANAGEMENT/SOCIAL WORK - PATIENT PORTAL LINK FT
You can access the FollowMyHealth Patient Portal offered by [facility] by registering at the following website: [email or web address]. By joining [facility]’s FollowMyHealth portal, you will also be able to view your health information using other applications (apps) compatible with our system.

## 2020-12-09 ENCOUNTER — INPATIENT (INPATIENT)
Facility: HOSPITAL | Age: 75
LOS: 5 days | Discharge: HOME | DRG: 884 | End: 2020-12-15
Attending: STUDENT IN AN ORGANIZED HEALTH CARE EDUCATION/TRAINING PROGRAM | Admitting: STUDENT IN AN ORGANIZED HEALTH CARE EDUCATION/TRAINING PROGRAM
Payer: MEDICAID

## 2020-12-09 VITALS
OXYGEN SATURATION: 97 % | TEMPERATURE: 98 F | SYSTOLIC BLOOD PRESSURE: 152 MMHG | DIASTOLIC BLOOD PRESSURE: 87 MMHG | HEIGHT: 64 IN | RESPIRATION RATE: 16 BRPM | WEIGHT: 125 LBS | HEART RATE: 92 BPM

## 2020-12-09 PROCEDURE — 71045 X-RAY EXAM CHEST 1 VIEW: CPT | Mod: 26

## 2020-12-09 RX ORDER — SODIUM CHLORIDE 9 MG/ML
1000 INJECTION INTRAMUSCULAR; INTRAVENOUS; SUBCUTANEOUS ONCE
Refills: 0 | Status: COMPLETED | OUTPATIENT
Start: 2020-12-09 | End: 2020-12-09

## 2020-12-09 RX ADMIN — SODIUM CHLORIDE 1000 MILLILITER(S): 9 INJECTION INTRAMUSCULAR; INTRAVENOUS; SUBCUTANEOUS at 23:52

## 2020-12-09 NOTE — ED ADULT TRIAGE NOTE - CHIEF COMPLAINT QUOTE
BIBA, ems states pt's family said pt not eating or drinking and increasing confusion and may have abd pain, pt is demented at baseline, poor historian, h/o CVA w/ right sided weakness, right arm fx 2 months ago from fall

## 2020-12-10 DIAGNOSIS — Z86.718 PERSONAL HISTORY OF OTHER VENOUS THROMBOSIS AND EMBOLISM: ICD-10-CM

## 2020-12-10 DIAGNOSIS — Z29.9 ENCOUNTER FOR PROPHYLACTIC MEASURES, UNSPECIFIED: ICD-10-CM

## 2020-12-10 DIAGNOSIS — R62.7 ADULT FAILURE TO THRIVE: ICD-10-CM

## 2020-12-10 DIAGNOSIS — F32.9 MAJOR DEPRESSIVE DISORDER, SINGLE EPISODE, UNSPECIFIED: ICD-10-CM

## 2020-12-10 DIAGNOSIS — I63.9 CEREBRAL INFARCTION, UNSPECIFIED: ICD-10-CM

## 2020-12-10 LAB
A1C WITH ESTIMATED AVERAGE GLUCOSE RESULT: 4.7 % — SIGNIFICANT CHANGE UP (ref 4–5.6)
ALBUMIN SERPL ELPH-MCNC: 3.1 G/DL — LOW (ref 3.5–5)
ALBUMIN SERPL ELPH-MCNC: 3.8 G/DL — SIGNIFICANT CHANGE UP (ref 3.5–5)
ALP SERPL-CCNC: 61 U/L — SIGNIFICANT CHANGE UP (ref 40–120)
ALP SERPL-CCNC: 74 U/L — SIGNIFICANT CHANGE UP (ref 40–120)
ALT FLD-CCNC: 26 U/L DA — SIGNIFICANT CHANGE UP (ref 10–60)
ALT FLD-CCNC: 29 U/L DA — SIGNIFICANT CHANGE UP (ref 10–60)
ANION GAP SERPL CALC-SCNC: 10 MMOL/L — SIGNIFICANT CHANGE UP (ref 5–17)
ANION GAP SERPL CALC-SCNC: 8 MMOL/L — SIGNIFICANT CHANGE UP (ref 5–17)
APPEARANCE UR: CLEAR — SIGNIFICANT CHANGE UP
APTT BLD: 31.8 SEC — SIGNIFICANT CHANGE UP (ref 27.5–35.5)
AST SERPL-CCNC: 17 U/L — SIGNIFICANT CHANGE UP (ref 10–40)
AST SERPL-CCNC: 20 U/L — SIGNIFICANT CHANGE UP (ref 10–40)
BASOPHILS # BLD AUTO: 0.02 K/UL — SIGNIFICANT CHANGE UP (ref 0–0.2)
BASOPHILS # BLD AUTO: 0.02 K/UL — SIGNIFICANT CHANGE UP (ref 0–0.2)
BASOPHILS NFR BLD AUTO: 0.6 % — SIGNIFICANT CHANGE UP (ref 0–2)
BASOPHILS NFR BLD AUTO: 0.7 % — SIGNIFICANT CHANGE UP (ref 0–2)
BILIRUB DIRECT SERPL-MCNC: 0.2 MG/DL — SIGNIFICANT CHANGE UP (ref 0–0.2)
BILIRUB INDIRECT FLD-MCNC: 0.6 MG/DL — SIGNIFICANT CHANGE UP (ref 0.2–1)
BILIRUB SERPL-MCNC: 0.8 MG/DL — SIGNIFICANT CHANGE UP (ref 0.2–1.2)
BILIRUB UR-MCNC: NEGATIVE — SIGNIFICANT CHANGE UP
BUN SERPL-MCNC: 13 MG/DL — SIGNIFICANT CHANGE UP (ref 7–18)
BUN SERPL-MCNC: 16 MG/DL — SIGNIFICANT CHANGE UP (ref 7–18)
CALCIUM SERPL-MCNC: 10 MG/DL — SIGNIFICANT CHANGE UP (ref 8.4–10.5)
CALCIUM SERPL-MCNC: 9.3 MG/DL — SIGNIFICANT CHANGE UP (ref 8.4–10.5)
CHLORIDE SERPL-SCNC: 102 MMOL/L — SIGNIFICANT CHANGE UP (ref 96–108)
CHLORIDE SERPL-SCNC: 109 MMOL/L — HIGH (ref 96–108)
CHOLEST SERPL-MCNC: 180 MG/DL — SIGNIFICANT CHANGE UP
CO2 SERPL-SCNC: 25 MMOL/L — SIGNIFICANT CHANGE UP (ref 22–31)
CO2 SERPL-SCNC: 28 MMOL/L — SIGNIFICANT CHANGE UP (ref 22–31)
COLOR SPEC: YELLOW — SIGNIFICANT CHANGE UP
CREAT SERPL-MCNC: 0.53 MG/DL — SIGNIFICANT CHANGE UP (ref 0.5–1.3)
CREAT SERPL-MCNC: 0.76 MG/DL — SIGNIFICANT CHANGE UP (ref 0.5–1.3)
DIFF PNL FLD: ABNORMAL
EOSINOPHIL # BLD AUTO: 0.07 K/UL — SIGNIFICANT CHANGE UP (ref 0–0.5)
EOSINOPHIL # BLD AUTO: 0.08 K/UL — SIGNIFICANT CHANGE UP (ref 0–0.5)
EOSINOPHIL NFR BLD AUTO: 2.5 % — SIGNIFICANT CHANGE UP (ref 0–6)
EOSINOPHIL NFR BLD AUTO: 2.5 % — SIGNIFICANT CHANGE UP (ref 0–6)
ERYTHROCYTE [SEDIMENTATION RATE] IN BLOOD: 6 MM/HR — SIGNIFICANT CHANGE UP (ref 0–20)
ESTIMATED AVERAGE GLUCOSE: 88 MG/DL — SIGNIFICANT CHANGE UP (ref 68–114)
FOLATE SERPL-MCNC: >20 NG/ML — SIGNIFICANT CHANGE UP
GLUCOSE SERPL-MCNC: 102 MG/DL — HIGH (ref 70–99)
GLUCOSE SERPL-MCNC: 81 MG/DL — SIGNIFICANT CHANGE UP (ref 70–99)
GLUCOSE UR QL: NEGATIVE — SIGNIFICANT CHANGE UP
HCT VFR BLD CALC: 40.5 % — SIGNIFICANT CHANGE UP (ref 34.5–45)
HCT VFR BLD CALC: 46.9 % — HIGH (ref 34.5–45)
HDLC SERPL-MCNC: 39 MG/DL — LOW
HGB BLD-MCNC: 12.7 G/DL — SIGNIFICANT CHANGE UP (ref 11.5–15.5)
HGB BLD-MCNC: 14.5 G/DL — SIGNIFICANT CHANGE UP (ref 11.5–15.5)
IMM GRANULOCYTES NFR BLD AUTO: 0 % — SIGNIFICANT CHANGE UP (ref 0–1.5)
IMM GRANULOCYTES NFR BLD AUTO: 0.3 % — SIGNIFICANT CHANGE UP (ref 0–1.5)
INR BLD: 1.21 RATIO — HIGH (ref 0.88–1.16)
KETONES UR-MCNC: ABNORMAL
LACTATE SERPL-SCNC: 0.9 MMOL/L — SIGNIFICANT CHANGE UP (ref 0.7–2)
LEUKOCYTE ESTERASE UR-ACNC: NEGATIVE — SIGNIFICANT CHANGE UP
LIDOCAIN IGE QN: 155 U/L — SIGNIFICANT CHANGE UP (ref 73–393)
LIPID PNL WITH DIRECT LDL SERPL: 124 MG/DL — HIGH
LYMPHOCYTES # BLD AUTO: 1.08 K/UL — SIGNIFICANT CHANGE UP (ref 1–3.3)
LYMPHOCYTES # BLD AUTO: 1.11 K/UL — SIGNIFICANT CHANGE UP (ref 1–3.3)
LYMPHOCYTES # BLD AUTO: 33.2 % — SIGNIFICANT CHANGE UP (ref 13–44)
LYMPHOCYTES # BLD AUTO: 40.4 % — SIGNIFICANT CHANGE UP (ref 13–44)
MAGNESIUM SERPL-MCNC: 2 MG/DL — SIGNIFICANT CHANGE UP (ref 1.6–2.6)
MCHC RBC-ENTMCNC: 29.5 PG — SIGNIFICANT CHANGE UP (ref 27–34)
MCHC RBC-ENTMCNC: 29.8 PG — SIGNIFICANT CHANGE UP (ref 27–34)
MCHC RBC-ENTMCNC: 30.9 GM/DL — LOW (ref 32–36)
MCHC RBC-ENTMCNC: 31.4 GM/DL — LOW (ref 32–36)
MCV RBC AUTO: 95.1 FL — SIGNIFICANT CHANGE UP (ref 80–100)
MCV RBC AUTO: 95.3 FL — SIGNIFICANT CHANGE UP (ref 80–100)
MONOCYTES # BLD AUTO: 0.23 K/UL — SIGNIFICANT CHANGE UP (ref 0–0.9)
MONOCYTES # BLD AUTO: 0.29 K/UL — SIGNIFICANT CHANGE UP (ref 0–0.9)
MONOCYTES NFR BLD AUTO: 8.4 % — SIGNIFICANT CHANGE UP (ref 2–14)
MONOCYTES NFR BLD AUTO: 8.9 % — SIGNIFICANT CHANGE UP (ref 2–14)
NEUTROPHILS # BLD AUTO: 1.32 K/UL — LOW (ref 1.8–7.4)
NEUTROPHILS # BLD AUTO: 1.77 K/UL — LOW (ref 1.8–7.4)
NEUTROPHILS NFR BLD AUTO: 48 % — SIGNIFICANT CHANGE UP (ref 43–77)
NEUTROPHILS NFR BLD AUTO: 54.5 % — SIGNIFICANT CHANGE UP (ref 43–77)
NITRITE UR-MCNC: NEGATIVE — SIGNIFICANT CHANGE UP
NON HDL CHOLESTEROL: 141 MG/DL — HIGH
NRBC # BLD: 0 /100 WBCS — SIGNIFICANT CHANGE UP (ref 0–0)
NRBC # BLD: 0 /100 WBCS — SIGNIFICANT CHANGE UP (ref 0–0)
NT-PROBNP SERPL-SCNC: 25 PG/ML — SIGNIFICANT CHANGE UP (ref 0–450)
PH UR: 6 — SIGNIFICANT CHANGE UP (ref 5–8)
PHOSPHATE SERPL-MCNC: 2.8 MG/DL — SIGNIFICANT CHANGE UP (ref 2.5–4.5)
PLATELET # BLD AUTO: 162 K/UL — SIGNIFICANT CHANGE UP (ref 150–400)
PLATELET # BLD AUTO: 179 K/UL — SIGNIFICANT CHANGE UP (ref 150–400)
POTASSIUM SERPL-MCNC: 3.3 MMOL/L — LOW (ref 3.5–5.3)
POTASSIUM SERPL-MCNC: 3.6 MMOL/L — SIGNIFICANT CHANGE UP (ref 3.5–5.3)
POTASSIUM SERPL-SCNC: 3.3 MMOL/L — LOW (ref 3.5–5.3)
POTASSIUM SERPL-SCNC: 3.6 MMOL/L — SIGNIFICANT CHANGE UP (ref 3.5–5.3)
PROCALCITONIN SERPL-MCNC: 0.04 NG/ML — SIGNIFICANT CHANGE UP (ref 0.02–0.1)
PROT SERPL-MCNC: 6 G/DL — SIGNIFICANT CHANGE UP (ref 6–8.3)
PROT SERPL-MCNC: 7 G/DL — SIGNIFICANT CHANGE UP (ref 6–8.3)
PROT UR-MCNC: NEGATIVE — SIGNIFICANT CHANGE UP
PROTHROM AB SERPL-ACNC: 14.3 SEC — HIGH (ref 10.6–13.6)
RBC # BLD: 4.26 M/UL — SIGNIFICANT CHANGE UP (ref 3.8–5.2)
RBC # BLD: 4.92 M/UL — SIGNIFICANT CHANGE UP (ref 3.8–5.2)
RBC # FLD: 14.3 % — SIGNIFICANT CHANGE UP (ref 10.3–14.5)
RBC # FLD: 14.4 % — SIGNIFICANT CHANGE UP (ref 10.3–14.5)
SARS-COV-2 IGG SERPL QL IA: NEGATIVE — SIGNIFICANT CHANGE UP
SARS-COV-2 IGM SERPL IA-ACNC: 1.02 INDEX — SIGNIFICANT CHANGE UP
SARS-COV-2 RNA SPEC QL NAA+PROBE: SIGNIFICANT CHANGE UP
SODIUM SERPL-SCNC: 140 MMOL/L — SIGNIFICANT CHANGE UP (ref 135–145)
SODIUM SERPL-SCNC: 142 MMOL/L — SIGNIFICANT CHANGE UP (ref 135–145)
SP GR SPEC: 1.02 — SIGNIFICANT CHANGE UP (ref 1.01–1.02)
TRIGL SERPL-MCNC: 87 MG/DL — SIGNIFICANT CHANGE UP
TROPONIN I SERPL-MCNC: 0.02 NG/ML — SIGNIFICANT CHANGE UP (ref 0–0.04)
TROPONIN I SERPL-MCNC: 0.04 NG/ML — SIGNIFICANT CHANGE UP (ref 0–0.04)
TSH SERPL-MCNC: 0.41 UU/ML — SIGNIFICANT CHANGE UP (ref 0.34–4.82)
TSH SERPL-MCNC: 0.54 UU/ML — SIGNIFICANT CHANGE UP (ref 0.34–4.82)
UROBILINOGEN FLD QL: NEGATIVE — SIGNIFICANT CHANGE UP
VIT B12 SERPL-MCNC: 568 PG/ML — SIGNIFICANT CHANGE UP (ref 232–1245)
WBC # BLD: 2.75 K/UL — LOW (ref 3.8–10.5)
WBC # BLD: 3.25 K/UL — LOW (ref 3.8–10.5)
WBC # FLD AUTO: 2.75 K/UL — LOW (ref 3.8–10.5)
WBC # FLD AUTO: 3.25 K/UL — LOW (ref 3.8–10.5)

## 2020-12-10 PROCEDURE — 99222 1ST HOSP IP/OBS MODERATE 55: CPT

## 2020-12-10 PROCEDURE — 99285 EMERGENCY DEPT VISIT HI MDM: CPT

## 2020-12-10 PROCEDURE — 70450 CT HEAD/BRAIN W/O DYE: CPT | Mod: 26

## 2020-12-10 RX ORDER — PANTOPRAZOLE SODIUM 20 MG/1
40 TABLET, DELAYED RELEASE ORAL
Refills: 0 | Status: DISCONTINUED | OUTPATIENT
Start: 2020-12-10 | End: 2020-12-12

## 2020-12-10 RX ORDER — DIGOXIN 250 MCG
1 TABLET ORAL
Qty: 0 | Refills: 0 | DISCHARGE

## 2020-12-10 RX ORDER — METOPROLOL TARTRATE 50 MG
25 TABLET ORAL
Refills: 0 | Status: DISCONTINUED | OUTPATIENT
Start: 2020-12-10 | End: 2020-12-15

## 2020-12-10 RX ORDER — LANOLIN ALCOHOL/MO/W.PET/CERES
5 CREAM (GRAM) TOPICAL AT BEDTIME
Refills: 0 | Status: DISCONTINUED | OUTPATIENT
Start: 2020-12-10 | End: 2020-12-14

## 2020-12-10 RX ORDER — SERTRALINE 25 MG/1
25 TABLET, FILM COATED ORAL DAILY
Refills: 0 | Status: DISCONTINUED | OUTPATIENT
Start: 2020-12-10 | End: 2020-12-15

## 2020-12-10 RX ORDER — POTASSIUM CHLORIDE 20 MEQ
40 PACKET (EA) ORAL ONCE
Refills: 0 | Status: COMPLETED | OUTPATIENT
Start: 2020-12-10 | End: 2020-12-10

## 2020-12-10 RX ORDER — ACETAMINOPHEN 500 MG
2 TABLET ORAL
Qty: 0 | Refills: 0 | DISCHARGE

## 2020-12-10 RX ORDER — APIXABAN 2.5 MG/1
5 TABLET, FILM COATED ORAL EVERY 12 HOURS
Refills: 0 | Status: DISCONTINUED | OUTPATIENT
Start: 2020-12-10 | End: 2020-12-15

## 2020-12-10 RX ORDER — METOPROLOL TARTRATE 50 MG
1 TABLET ORAL
Qty: 0 | Refills: 0 | DISCHARGE

## 2020-12-10 RX ORDER — ATORVASTATIN CALCIUM 80 MG/1
1 TABLET, FILM COATED ORAL
Qty: 0 | Refills: 0 | DISCHARGE

## 2020-12-10 RX ORDER — FOLIC ACID 0.8 MG
1 TABLET ORAL DAILY
Refills: 0 | Status: DISCONTINUED | OUTPATIENT
Start: 2020-12-10 | End: 2020-12-15

## 2020-12-10 RX ORDER — SENNA PLUS 8.6 MG/1
2 TABLET ORAL AT BEDTIME
Refills: 0 | Status: DISCONTINUED | OUTPATIENT
Start: 2020-12-10 | End: 2020-12-15

## 2020-12-10 RX ADMIN — Medication 25 MILLIGRAM(S): at 18:01

## 2020-12-10 RX ADMIN — PANTOPRAZOLE SODIUM 40 MILLIGRAM(S): 20 TABLET, DELAYED RELEASE ORAL at 08:20

## 2020-12-10 RX ADMIN — APIXABAN 5 MILLIGRAM(S): 2.5 TABLET, FILM COATED ORAL at 18:00

## 2020-12-10 RX ADMIN — APIXABAN 5 MILLIGRAM(S): 2.5 TABLET, FILM COATED ORAL at 05:50

## 2020-12-10 RX ADMIN — Medication 25 MILLIGRAM(S): at 05:50

## 2020-12-10 RX ADMIN — Medication 1 MILLIGRAM(S): at 13:36

## 2020-12-10 RX ADMIN — SERTRALINE 25 MILLIGRAM(S): 25 TABLET, FILM COATED ORAL at 13:36

## 2020-12-10 RX ADMIN — Medication 40 MILLIEQUIVALENT(S): at 05:50

## 2020-12-10 NOTE — H&P ADULT - HISTORY OF PRESENT ILLNESS
74 year old female with medical history of iliac vein thrombosis and CVA  in past was sent to ED because patient ws not eating for 2 days . Patient is AAO x 2 but is confused and doesnot know why she is on hospital exactly . Spoke to patients Grand son he told that [ateint has base line dementia but for past two days she had not been eating any thing so they thought patient is getting dehydrated and needs care. grand son denied any acute illness , fever , cough or any problems. Off note Gran son also mentioned that patient was at some nursing home there she had a fall and R arm got fractures and she was sent to nursing hospital and was discharged home .      GOC : full code 75 year old female with medical history of iliac vein thrombosis and CVA  in past was sent to ED because patient ws not eating for 2 days . Patient is AAO x 2 but is confused and doesnot know why she is on hospital exactly . Spoke to patients Grand son he told that [ateint has base line dementia but for past two days she had not been eating any thing so they thought patient is getting dehydrated and needs care. grand son denied any acute illness , fever , cough or any problems. Off note Gran son also mentioned that patient was at some nursing home there she had a fall and R arm got fractures and she was sent to nursing hospital and was discharged home .      GOC : full code

## 2020-12-10 NOTE — SWALLOW BEDSIDE ASSESSMENT ADULT - SWALLOW EVAL: DIAGNOSIS
Pt p/w mild oral dysphagia c/b poor bolus formation and multiple swallows w/ no s/s of penetration/aspiration.

## 2020-12-10 NOTE — PHYSICAL THERAPY INITIAL EVALUATION ADULT - ACTIVE RANGE OF MOTION EXAMINATION, REHAB EVAL
bilateral  lower extremity Active ROM was WFL (within functional limits)/Left UE Active ROM was WFL (within functional limits)/Right UE N/A due to awaiting to have x=ray to be done

## 2020-12-10 NOTE — PROGRESS NOTE ADULT - SUBJECTIVE AND OBJECTIVE BOX
PGY-1 Progress Note discussed with attending    PAGER #: [[phone]] TILL 5:00 PM  PLEASE CONTACT ON CALL TEAM:  - On Call Team (Please refer to Lucy) FROM 5:00 PM - 8:30PM  - Nightfloat Team FROM 8:30 -7:30 AM    CHIEF COMPLAINT & BRIEF HOSPITAL COURSE:    INTERVAL HPI/OVERNIGHT EVENTS:       REVIEW OF SYSTEMS:  CONSTITUTIONAL: No fever, weight loss, or fatigue  RESPIRATORY: No cough, wheezing, chills or hemoptysis; No shortness of breath  CARDIOVASCULAR: No chest pain, palpitations, dizziness, or leg swelling  GASTROINTESTINAL: No abdominal pain. No nausea, vomiting, or hematemesis; No diarrhea or constipation. No melena or hematochezia.  GENITOURINARY: No dysuria or hematuria, urinary frequency  NEUROLOGICAL: No headaches, memory loss, loss of strength, numbness, or tremors  SKIN: No itching, burning, rashes, or lesions     Vital Signs Last 24 Hrs  T(C): 36.1 (10 Dec 2020 08:22), Max: 36.7 (09 Dec 2020 23:16)  T(F): 97 (10 Dec 2020 08:22), Max: 98.1 (09 Dec 2020 23:16)  HR: 77 (10 Dec 2020 11:00) (74 - 92)  BP: 157/78 (10 Dec 2020 11:00) (132/65 - 157/78)  BP(mean): --  RR: 17 (10 Dec 2020 08:22) (16 - 18)  SpO2: 98% (10 Dec 2020 11:00) (97% - 100%)    PHYSICAL EXAMINATION:  GENERAL: NAD, well built  HEAD:  Atraumatic, Normocephalic  EYES:  conjunctiva and sclera clear  NECK: Supple, No JVD, Normal thyroid  CHEST/LUNG: Clear to auscultation. Clear to percussion bilaterally; No rales, rhonchi, wheezing, or rubs  HEART: Regular rate and rhythm; No murmurs, rubs, or gallops  ABDOMEN: Soft, Nontender, Nondistended; Bowel sounds present  NERVOUS SYSTEM:  Alert & Oriented X3,    EXTREMITIES:  2+ Peripheral Pulses, No clubbing, cyanosis, or edema  SKIN: warm dry                          12.7   3.25  )-----------( 162      ( 10 Dec 2020 07:38 )             40.5     12-10    142  |  109<H>  |  13  ----------------------------<  81  3.6   |  25  |  0.53    Ca    9.3      10 Dec 2020 07:38  Phos  2.8     12-10  Mg     2.0     12-10    TPro  6.0  /  Alb  3.1<L>  /  TBili  0.8  /  DBili  x   /  AST  20  /  ALT  26  /  AlkPhos  61  12-10    LIVER FUNCTIONS - ( 10 Dec 2020 07:38 )  Alb: 3.1 g/dL / Pro: 6.0 g/dL / ALK PHOS: 61 U/L / ALT: 26 U/L DA / AST: 20 U/L / GGT: x           CARDIAC MARKERS ( 10 Dec 2020 07:38 )  0.039 ng/mL / x     / x     / x     / x      CARDIAC MARKERS ( 10 Dec 2020 00:11 )  0.024 ng/mL / x     / x     / x     / x          PT/INR - ( 10 Dec 2020 00:11 )   PT: 14.3 sec;   INR: 1.21 ratio         PTT - ( 10 Dec 2020 00:11 )  PTT:31.8 sec    CAPILLARY BLOOD GLUCOSE      RADIOLOGY & ADDITIONAL TESTS:                   PGY-1 Progress Note discussed with attending    PAGER #: [[phone]] TILL 5:00 PM  PLEASE CONTACT ON CALL TEAM:  - On Call Team (Please refer to Lucy) FROM 5:00 PM - 8:30PM  - Nightfloat Team FROM 8:30 -7:30 AM    CHIEF COMPLAINT & BRIEF HOSPITAL COURSE: 75 year old female with medical history of iliac vein thrombosis and CVA  in past was sent to ED because patient ws not eating for 2 days . Patient is AAO x 0.  Spoke to patients Grand son he told that patient has base line dementia , she had stroke in March , in July she had iliac vein thrombosis, [facility] [facility]she was there for 3 months and had fracture of right arm . she was send to [facility] and from hospital she was discharged at home with HHA 5hr/day for 5 days as family doesn't want to send her to NH or [facility]. Her HHA was increased to 7 hr/ day for a week. Maryan is the daughter who takes care of mom mostly Pt is bedbound , moved to chair sometimes by family.     Primary team spoke to the daughter Maryan Phone #[phone]. She mentioned that she wants to give all update of her mom treatment to the grandson. Pt is under his grandson insurance but for any consent contact Daughter Maryan.      INTERVAL HPI/OVERNIGHT EVENTS: Pt examined at bedside this am . AAOX0.       REVIEW OF SYSTEMS:  Unable to assess.    Vital Signs Last 24 Hrs  T(C): 36.1 (10 Dec 2020 08:22), Max: 36.7 (09 Dec 2020 23:16)  T(F): 97 (10 Dec 2020 08:22), Max: 98.1 (09 Dec 2020 23:16)  HR: 77 (10 Dec 2020 11:00) (74 - 92)  BP: 157/78 (10 Dec 2020 11:00) (132/65 - 157/78)  BP(mean): --  RR: 17 (10 Dec 2020 08:22) (16 - 18)  SpO2: 98% (10 Dec 2020 11:00) (97% - 100%)    PHYSICAL EXAMINATION:  GENERAL: NAD, lying comfortably on bed  HEAD:  Atraumatic, Normocephalic  EYES:  conjunctiva and sclera clear  NECK: Supple, No JVD, Normal thyroid  CHEST/LUNG: Clear to auscultation.   HEART: Regular rate and rhythm; No murmurs, rubs, or gallops  ABDOMEN: Soft, Nontender, Nondistended; Bowel sounds present  NERVOUS SYSTEM:  Alert & Oriented X0, residual right side weakness after stroke   EXTREMITIES:  2+ Peripheral Pulses, No clubbing, cyanosis, or edema  SKIN: warm dry                          12.7   3.25  )-----------( 162      ( 10 Dec 2020 07:38 )             40.5     12-10    142  |  109<H>  |  13  ----------------------------<  81  3.6   |  25  |  0.53    Ca    9.3      10 Dec 2020 07:38  Phos  2.8     12-10  Mg     2.0     12-10    TPro  6.0  /  Alb  3.1<L>  /  TBili  0.8  /  DBili  x   /  AST  20  /  ALT  26  /  AlkPhos  61  12-10    LIVER FUNCTIONS - ( 10 Dec 2020 07:38 )  Alb: 3.1 g/dL / Pro: 6.0 g/dL / ALK PHOS: 61 U/L / ALT: 26 U/L DA / AST: 20 U/L / GGT: x           CARDIAC MARKERS ( 10 Dec 2020 07:38 )  0.039 ng/mL / x     / x     / x     / x      CARDIAC MARKERS ( 10 Dec 2020 00:11 )  0.024 ng/mL / x     / x     / x     / x          PT/INR - ( 10 Dec 2020 00:11 )   PT: 14.3 sec;   INR: 1.21 ratio         PTT - ( 10 Dec 2020 00:11 )  PTT:31.8 sec    CAPILLARY BLOOD GLUCOSE      RADIOLOGY & ADDITIONAL TESTS:                   PGY-1 Progress Note discussed with attending    PAGER #: [[phone]] TILL 5:00 PM  PLEASE CONTACT ON CALL TEAM:  - On Call Team (Please refer to Lucy) FROM 5:00 PM - 8:30PM  - Nightfloat Team FROM 8:30 -7:30 AM    CHIEF COMPLAINT & BRIEF HOSPITAL COURSE: 75 year old female with medical history of iliac vein thrombosis and CVA  in past was sent to ED because patient ws not eating for 2 days . Patient is AAO x 0.  Spoke to patients Grand son he told that patient has base line dementia , she had stroke in March , in July she had iliac vein thrombosis, [facility] [place] .she was there for 3 months and had fracture of right arm . she was send to [facility] and from hospital she was discharged at home with HHA 5hr/day for 5 days as family doesn't want to send her to NH or [facility]. Her HHA was increased to 7 hr/ day for a week. Maryan is the daughter who takes care of mom mostly Pt is bedbound , moved to chair sometimes by family.     Primary team spoke to the daughter Maryan Phone #[phone]. She mentioned that she wants to give all update of her mom treatment to the grandson. Pt is under his grandson insurance but for any consent form contact Daughter Maryan.      INTERVAL HPI/OVERNIGHT EVENTS: Pt examined at bedside this am . AAOX0.       REVIEW OF SYSTEMS:  Unable to assess.    Vital Signs Last 24 Hrs  T(C): 36.1 (10 Dec 2020 08:22), Max: 36.7 (09 Dec 2020 23:16)  T(F): 97 (10 Dec 2020 08:22), Max: 98.1 (09 Dec 2020 23:16)  HR: 77 (10 Dec 2020 11:00) (74 - 92)  BP: 157/78 (10 Dec 2020 11:00) (132/65 - 157/78)  BP(mean): --  RR: 17 (10 Dec 2020 08:22) (16 - 18)  SpO2: 98% (10 Dec 2020 11:00) (97% - 100%)    PHYSICAL EXAMINATION:  GENERAL: NAD, lying comfortably on bed  HEAD:  Atraumatic, Normocephalic  EYES:  conjunctiva and sclera clear  NECK: Supple, No JVD, Normal thyroid  CHEST/LUNG: Clear to auscultation.   HEART: Regular rate and rhythm; No murmurs, rubs, or gallops  ABDOMEN: Soft, Nontender, Nondistended; Bowel sounds present  NERVOUS SYSTEM:  Alert & Oriented X0, residual right side weakness after stroke   EXTREMITIES:  2+ Peripheral Pulses, No clubbing, cyanosis, or edema  SKIN: warm dry                          12.7   3.25  )-----------( 162      ( 10 Dec 2020 07:38 )             40.5     12-10    142  |  109<H>  |  13  ----------------------------<  81  3.6   |  25  |  0.53    Ca    9.3      10 Dec 2020 07:38  Phos  2.8     12-10  Mg     2.0     12-10    TPro  6.0  /  Alb  3.1<L>  /  TBili  0.8  /  DBili  x   /  AST  20  /  ALT  26  /  AlkPhos  61  12-10    LIVER FUNCTIONS - ( 10 Dec 2020 07:38 )  Alb: 3.1 g/dL / Pro: 6.0 g/dL / ALK PHOS: 61 U/L / ALT: 26 U/L DA / AST: 20 U/L / GGT: x           CARDIAC MARKERS ( 10 Dec 2020 07:38 )  0.039 ng/mL / x     / x     / x     / x      CARDIAC MARKERS ( 10 Dec 2020 00:11 )  0.024 ng/mL / x     / x     / x     / x          PT/INR - ( 10 Dec 2020 00:11 )   PT: 14.3 sec;   INR: 1.21 ratio         PTT - ( 10 Dec 2020 00:11 )  PTT:31.8 sec    CAPILLARY BLOOD GLUCOSE      RADIOLOGY & ADDITIONAL TESTS:

## 2020-12-10 NOTE — PHYSICAL THERAPY INITIAL EVALUATION ADULT - LEVEL OF INDEPENDENCE: STAIR NEGOTIATION, REHAB EVAL
Not assessed at this time as Pt. does not require stairs to access/negotiate home environment/unable to perform

## 2020-12-10 NOTE — ED PROVIDER NOTE - OBJECTIVE STATEMENT
75 year old female PMh CVA with residual right sided weakness, dementia coming in with worsening confusion, not eating or drinking for a couple days as per family and EMS. pt unable to provide further history but denies all questions asked. 75 year old female PMh CVA with residual right sided weakness, dementia coming in with worsening confusion, not eating or drinking for a couple days as per family and EMS. pt unable to provide further history but denies all questions asked. attempted to call emergency contact but no answer and unable to leave voicemail as it is not set up- no further collateral obtained.

## 2020-12-10 NOTE — H&P ADULT - ATTENDING COMMENTS
Patient is a 75 year old female with a PMH of Dementia, h/o CVA in 3/2020 with Right-sided Residual Weakness, Left common and internal iliac vein thrombosis due to May Thurner syndrome, h/o COVID Infection 7/1/2020, Constipation, h/o Fall ~2 months ago who was BIBEMS due to failure to thrive.  ( ID: [id number])  Patient is awake and alert, and although she is able to participate in interview, she is a poor historian.  Therefore, a significant amount of information was obtained from medical records.  As per ED Attending note "worsening confusion, not eating or drinking for a couple days as per family and EMS."    T(C): 36.4 (12-10-20 @ 03:19), Max: 36.7 (12-09-20 @ 23:16)  T(F): 97.6 (12-10-20 @ 03:19), Max: 98.1 (12-09-20 @ 23:16)  HR: 81 (12-10-20 @ 03:19) (81 - 92)  BP: 137/65 (12-10-20 @ 03:19) (137/65 - 152/87)  RR: 18 (12-10-20 @ 03:19) (16 - 18)  SpO2: 100% (12-10-20 @ 03:19) (97% - 100%)  Wt(kg): --    P/E: As above MAR    A/P:    Failure to Thrive:  -SIRS Criteria= 2 (WBC<4000, HR>90) + no clear source of infection  -COVID= Not Detected  -Urinalysis is clean  -Chest film, though portable and rotated, does not appear to demonstrate any densities possibly suggestive of a pneumonia  -Currently awaiting CT Head in ED  -TTE (7/2/2020) identified Trace mitral regurgitation.  Probably trileaflet aortic valve is not well seen. Trivial aortic regurgitation.  LVEF= 54% by  biplane.  Grade I diastolic dysfunction (Impaired relaxation).  Trace tricuspid regurgitation.  Trace pulmonic insufficiency is noted.  -Troponin= 0.024  -Will send at least a second troponin with simultaneous acquisition of EKG  -Will send ESR, CRP, Procalcitonin to further evaluate for any possible source of infection  -Will send Thyroid Panel, Thiamine, Folate, B12, Hemoglobin A1c with AM labs  -Will correct all electrolyte derangements (including glucose) as necessary  -Will continue to provide all additional supportive care as necessary  -Speech and Swallow evaluation  -Will maintain the Head of the Bed elevated to at least 30-45 degrees  -Fall Precautions, Aspiration Precautions  -Will need to ask Rehab to evaluate patient  -Will certainly need to involve  in order to determine if it would be in patient's best interests to be discharged home with HHA vs Assisted Living Facility    Hypokalemia:  -Will replete as necessary and recheck    May-Thurner Syndrome:  -CT Abdomen/Pelvis with IV contrast (7/8/2020) identified a filling defects are again seen in the left common iliac vein and left internal iliac vein, compatible with deep vein thrombosis. Apparent external compression of the left common iliac vein by the left common iliac artery.  -Will continue with patient's Eliquis after CTH does not identify any findings suspicious for ICH    Left Ovarian Cyst:  -CT Abdomen/Pelvis with IV contrast (7/8/2020) identified a stable 3.4 cm cystic lesion in the adnexal regions, likely representing a left ovarian cyst.  Transvaginal Ultrasound (7/5/2018) identified a left ovary 3.5 x 4 x 3.5 cm. 3.3 cm unilocular cyst. No septations or vascularized mural nodules.  -Patient should follow-up with OB/GYN as an outpatient after discharge    Bilateral Renal Cysts:  -CT Abdomen/Pelvis with IV contrast (7/8/2020) identified bilateral renal cysts measuring up to 2.2 cm on the right. Tiny hypodense lesion in the right kidney, too small to characterize.  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  ( ID: [id number])  Patient is awake and alert, and although she is able to participate in interview, she is a poor historian.  Therefore, a significant amount of information was obtained from medical records.  As per ED Attending note "worsening confusion, not eating or drinking for a couple days as per family and EMS."    T(C): 36.4 (12-10-20 @ 03:19), Max: 36.7 (12-09-20 @ 23:16)  T(F): 97.6 (12-10-20 @ 03:19), Max: 98.1 (12-09-20 @ 23:16)  HR: 81 (12-10-20 @ 03:19) (81 - 92)  BP: 137/65 (12-10-20 @ 03:19) (137/65 - 152/87)  RR: 18 (12-10-20 @ 03:19) (16 - 18)  SpO2: 100% (12-10-20 @ 03:19) (97% - 100%)  Wt(kg): --    P/E: As above MAR    A/P:    Failure to Thrive:  -SIRS Criteria= 2 (WBC<4000, HR>90) + no clear source of infection  -COVID= Not Detected  -Urinalysis is clean  -Chest film, though portable and rotated, does not appear to demonstrate any densities possibly suggestive of a pneumonia  -Currently awaiting CT Head in ED  -TTE (7/2/2020) identified trace mitral regurgitation.  Probably trileaflet aortic valve is not well seen.  Trivial aortic regurgitation.  LVEF= 54% by biplane.  Grade I diastolic dysfunction (Impaired relaxation).  Trace tricuspid regurgitation.  Trace pulmonic insufficiency is noted.  -Troponin= 0.024  -Will send at least a second troponin with simultaneous acquisition of EKG  -Will send ESR, CRP, Procalcitonin to further evaluate for any possible source of infection  -Will send Thyroid Panel, Thiamine, Folate, B12, Hemoglobin A1c with AM labs  -Will correct all electrolyte derangements (including glucose) as necessary  -Will continue to provide all additional supportive care as necessary  -Speech and Swallow evaluation  -Will maintain the Head of the Bed elevated to at least 30-45 degrees  -Fall Precautions, Aspiration Precautions  -Will need to ask Rehab to evaluate patient  -Will certainly need to involve  in order to determine if it would be in patient's best interests to be discharged home with HHA vs Assisted Living Facility    Hypokalemia:  -Will replete as necessary and recheck    May-Thurner Syndrome:  -CT Abdomen/Pelvis with IV contrast (7/8/2020) identified a filling defects are again seen in the left common iliac vein and left internal iliac vein, compatible with deep vein thrombosis. Apparent external compression of the left common iliac vein by the left common iliac artery.  -Will continue with patient's Eliquis after CTH does not identify any findings suspicious for ICH    Left Ovarian Cyst:  -CT Abdomen/Pelvis with IV contrast (7/8/2020) identified a stable 3.4 cm cystic lesion in the adnexal regions, likely representing a left ovarian cyst.  Transvaginal Ultrasound (7/5/2018) identified a left ovary 3.5 x 4 x 3.5 cm. 3.3 cm unilocular cyst. No septations or vascularized mural nodules.  -Patient should follow-up with OB/GYN as an outpatient after discharge    Bilateral Renal Cysts:  -CT Abdomen/Pelvis with IV contrast (7/8/2020) identified bilateral renal cysts measuring up to 2.2 cm on the right. Tiny hypodense lesion in the right kidney, too small to characterize.  -Patient should follow-up with Urology as an outpatient after discharge    Constipation:  -Will resume patient's home medications    Depression:  -Will resume patient's home medication    GI/DVT PPx:  -Eliquis  -Pepcid

## 2020-12-10 NOTE — ED PROVIDER NOTE - CLINICAL SUMMARY MEDICAL DECISION MAKING FREE TEXT BOX
75 year old female demented with worsening confusion and not eating/drinking. vitals WNL. PE as above.  labs, ua, cxr, fluid bolus, ecg, likely admission

## 2020-12-10 NOTE — ED ADULT NURSE NOTE - NSIMPLEMENTINTERV_GEN_ALL_ED
Implemented All Fall Risk Interventions:  [place] to call system. Call bell, personal items and telephone within reach. Instruct patient to call for assistance. Room bathroom lighting operational. Non-slip footwear when patient is off stretcher. Physically safe environment: no spills, clutter or unnecessary equipment. Stretcher in lowest position, wheels locked, appropriate side rails in place. Provide visual cue, wrist band, yellow gown, etc. Monitor gait and stability. Monitor for mental status changes and reorient to person, place, and time. Review medications for side effects contributing to fall risk. Reinforce activity limits and safety measures with patient and family.

## 2020-12-10 NOTE — PROGRESS NOTE ADULT - PROBLEM SELECTOR PLAN 2
Patient has hx of iliac vein thrombosis. CT scan on july 2020 showed .  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  Patient on Eliquis 5 mg bid  will continue . -Patient has hx of iliac vein thrombosis. CT scan on July 2020 showed .  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  -Patient on Eliquis 5 mg bid  will continue .

## 2020-12-10 NOTE — H&P ADULT - PROBLEM SELECTOR PLAN 2
Patient has hx of iliac vein thrombosis. CT scan on july 2020 showed .  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  Patient on Eliquis 5 mg bid  will continue .

## 2020-12-10 NOTE — PROGRESS NOTE ADULT - ASSESSMENT
75 year old female with medical history of iliac vein thrombosis and CVA  in past was sent to ED because patient ws not eating for 2 days . Patient being admitted  for Failure to thrive.

## 2020-12-10 NOTE — SWALLOW BEDSIDE ASSESSMENT ADULT - ASR SWALLOW ASPIRATION MONITOR
gurgly voice/upper respiratory infection/oral hygiene/position upright (90Y)/fever/throat clearing/pneumonia/change of breathing pattern/cough

## 2020-12-10 NOTE — H&P ADULT - PROBLEM SELECTOR PLAN 4
RISK                                                          Points  [] Previous VTE                                           3  [] Thrombophilia                                        2  [] Lower limb paralysis                              2   [] Current Cancer                                       2   [x] Immobilization > 24 hrs                        1  [] ICU/CCU stay > 24 hours                       1  [x] Age > 60                                                   1    Eliquis 5 mg bid

## 2020-12-10 NOTE — PROGRESS NOTE ADULT - PROBLEM SELECTOR PLAN 3
Patient takes sertraline 25 mg at home . will continue with home meds -Patient takes sertraline 25 mg at home . will continue with home meds

## 2020-12-10 NOTE — SWALLOW BEDSIDE ASSESSMENT ADULT - SLP GENERAL OBSERVATIONS
Pt AA+Ox2 (confused; repeated questions; unable to answer orientation questions 2/2 dementia diagnosis); HOB 90 degrees. Pt p/w mild R sided facial droop causing impaired labial coordination, and impaired lingual protrusion. Pt reported having dentures but not with her. Pt reported preferring puree diet as it is "easiest to chew".  used for eval ([id number]).

## 2020-12-10 NOTE — ED PROVIDER NOTE - PROGRESS NOTE DETAILS
labs are unremarkable. ua no uti. no infiltrate on cxr. attempted to call fam but no answer and cant leave voicemail. will admit for failure to thrive.

## 2020-12-10 NOTE — PROGRESS NOTE ADULT - PROBLEM SELECTOR PLAN 1
patient came in with not eating for two days.   Labs showed leukopenia  and hypokalemia. Potassium replaced.  nutrition consult in am.  PT eval .  Follow CT head . -patient came in with not eating for two days.   -Labs showed leukopenia  and hypokalemia. Potassium replaced.  -nutrition consulted  - PT >home  -CT head neg  - Speech and Swallow > nectar thin liquid -patient came in with not eating for two days.   -Labs showed leukopenia  and hypokalemia. Potassium replaced.  -nutrition consulted  - PT >home  -CT head neg  -Pt with hx of old fracture in right arm, f/u with right arm xray  - Speech and Swallow > nectar thin liquid

## 2020-12-10 NOTE — H&P ADULT - PROBLEM SELECTOR PLAN 1
patient came in with not eating for two days.   Labs showed leukopenia  and hypokalemia. Potassium replaced.  nutrition consult in am.  PT eval .  Follow CT head .

## 2020-12-10 NOTE — SWALLOW BEDSIDE ASSESSMENT ADULT - SLP PERTINENT HISTORY OF CURRENT PROBLEM
75 year old female with medical history of iliac vein thrombosis and CVA. Pt admitted for not eating past 2 days prior to admit. Patient is AAO x 2 but confused and does not know why she is in the hospital. Spoke to patient's grandson who reported the pt has baseline dementia but for past two days she had not been eating anything. Pt's grandson did not report any acute illness, fever, cough or any problems. Off note grandson also mentioned that patient was at a nursing home where she had a fall and R arm got fractures and she was sent to nursing hospital and was discharged home prior to being admitted to [facility].

## 2020-12-10 NOTE — PHYSICAL THERAPY INITIAL EVALUATION ADULT - GENERAL OBSERVATIONS, REHAB EVAL
Consult received,EMR, radiology and labs reviewed. Patient received supine in bed, NAD, follows commands in [place], Patient agreed to EVALUATION from Physical Therapist.

## 2020-12-10 NOTE — H&P ADULT - NSHPPHYSICALEXAM_GEN_ALL_CORE
Vital Signs Last 24 Hrs  T(C): 36.4 (10 Dec 2020 03:19), Max: 36.7 (09 Dec 2020 23:16)  T(F): 97.6 (10 Dec 2020 03:19), Max: 98.1 (09 Dec 2020 23:16)  HR: 81 (10 Dec 2020 03:19) (81 - 92)  BP: 137/65 (10 Dec 2020 03:19) (137/65 - 152/87)  BP(mean): --  RR: 18 (10 Dec 2020 03:19) (16 - 18)  SpO2: 100% (10 Dec 2020 03:19) (97% - 100%)    GENERAL: Elderly female anxious and confused  HEAD:  Atraumatic, Normocephalic  EYES: EOMI, PERRLA, conjunctiva and sclera clear  NECK: Supple, No JVD  CHEST/LUNG: Clear to auscultation bilaterally; No wheeze; No crackles; No accessory muscles used  HEART: Regular rate and rhythm; No murmurs;   ABDOMEN: Soft, Nontender, Nondistended; Bowel sounds present; No guarding  EXTREMITIES:  2+ Peripheral Pulses, No cyanosis or edema  PSYCH:  Normal Affect  NEUROLOGY: AAO X 2 , no new deficits  SKIN: No rashes or lesions

## 2020-12-11 ENCOUNTER — TRANSCRIPTION ENCOUNTER (OUTPATIENT)
Age: 75
End: 2020-12-11

## 2020-12-11 DIAGNOSIS — Z71.89 OTHER SPECIFIED COUNSELING: ICD-10-CM

## 2020-12-11 LAB
ALBUMIN SERPL ELPH-MCNC: 3 G/DL — LOW (ref 3.5–5)
ALP SERPL-CCNC: 67 U/L — SIGNIFICANT CHANGE UP (ref 40–120)
ALT FLD-CCNC: 35 U/L DA — SIGNIFICANT CHANGE UP (ref 10–60)
ANION GAP SERPL CALC-SCNC: 9 MMOL/L — SIGNIFICANT CHANGE UP (ref 5–17)
AST SERPL-CCNC: 25 U/L — SIGNIFICANT CHANGE UP (ref 10–40)
BILIRUB SERPL-MCNC: 0.7 MG/DL — SIGNIFICANT CHANGE UP (ref 0.2–1.2)
BUN SERPL-MCNC: 10 MG/DL — SIGNIFICANT CHANGE UP (ref 7–18)
CALCIUM SERPL-MCNC: 9.7 MG/DL — SIGNIFICANT CHANGE UP (ref 8.4–10.5)
CHLORIDE SERPL-SCNC: 109 MMOL/L — HIGH (ref 96–108)
CO2 SERPL-SCNC: 24 MMOL/L — SIGNIFICANT CHANGE UP (ref 22–31)
CREAT SERPL-MCNC: 0.58 MG/DL — SIGNIFICANT CHANGE UP (ref 0.5–1.3)
CRP SERPL-MCNC: <0.1 MG/DL — SIGNIFICANT CHANGE UP (ref 0–0.4)
GLUCOSE SERPL-MCNC: 87 MG/DL — SIGNIFICANT CHANGE UP (ref 70–99)
HCT VFR BLD CALC: 40.2 % — SIGNIFICANT CHANGE UP (ref 34.5–45)
HGB BLD-MCNC: 12.7 G/DL — SIGNIFICANT CHANGE UP (ref 11.5–15.5)
MAGNESIUM SERPL-MCNC: 1.7 MG/DL — SIGNIFICANT CHANGE UP (ref 1.6–2.6)
MCHC RBC-ENTMCNC: 30 PG — SIGNIFICANT CHANGE UP (ref 27–34)
MCHC RBC-ENTMCNC: 31.6 GM/DL — LOW (ref 32–36)
MCV RBC AUTO: 95 FL — SIGNIFICANT CHANGE UP (ref 80–100)
NRBC # BLD: 0 /100 WBCS — SIGNIFICANT CHANGE UP (ref 0–0)
PHOSPHATE SERPL-MCNC: 2.7 MG/DL — SIGNIFICANT CHANGE UP (ref 2.5–4.5)
PLATELET # BLD AUTO: 157 K/UL — SIGNIFICANT CHANGE UP (ref 150–400)
POTASSIUM SERPL-MCNC: 3.4 MMOL/L — LOW (ref 3.5–5.3)
POTASSIUM SERPL-SCNC: 3.4 MMOL/L — LOW (ref 3.5–5.3)
PROT SERPL-MCNC: 6.1 G/DL — SIGNIFICANT CHANGE UP (ref 6–8.3)
RBC # BLD: 4.23 M/UL — SIGNIFICANT CHANGE UP (ref 3.8–5.2)
RBC # FLD: 14.4 % — SIGNIFICANT CHANGE UP (ref 10.3–14.5)
SODIUM SERPL-SCNC: 142 MMOL/L — SIGNIFICANT CHANGE UP (ref 135–145)
WBC # BLD: 3.63 K/UL — LOW (ref 3.8–10.5)
WBC # FLD AUTO: 3.63 K/UL — LOW (ref 3.8–10.5)

## 2020-12-11 PROCEDURE — 99233 SBSQ HOSP IP/OBS HIGH 50: CPT | Mod: GC

## 2020-12-11 PROCEDURE — 73030 X-RAY EXAM OF SHOULDER: CPT | Mod: 26,RT

## 2020-12-11 PROCEDURE — 73090 X-RAY EXAM OF FOREARM: CPT | Mod: 26,LT

## 2020-12-11 RX ORDER — POTASSIUM CHLORIDE 20 MEQ
40 PACKET (EA) ORAL ONCE
Refills: 0 | Status: COMPLETED | OUTPATIENT
Start: 2020-12-11 | End: 2020-12-11

## 2020-12-11 RX ADMIN — APIXABAN 5 MILLIGRAM(S): 2.5 TABLET, FILM COATED ORAL at 05:49

## 2020-12-11 RX ADMIN — Medication 40 MILLIEQUIVALENT(S): at 12:05

## 2020-12-11 RX ADMIN — SERTRALINE 25 MILLIGRAM(S): 25 TABLET, FILM COATED ORAL at 12:06

## 2020-12-11 RX ADMIN — APIXABAN 5 MILLIGRAM(S): 2.5 TABLET, FILM COATED ORAL at 17:26

## 2020-12-11 RX ADMIN — PANTOPRAZOLE SODIUM 40 MILLIGRAM(S): 20 TABLET, DELAYED RELEASE ORAL at 05:50

## 2020-12-11 RX ADMIN — Medication 1 MILLIGRAM(S): at 12:06

## 2020-12-11 RX ADMIN — Medication 25 MILLIGRAM(S): at 05:50

## 2020-12-11 RX ADMIN — Medication 25 MILLIGRAM(S): at 17:26

## 2020-12-11 RX ADMIN — SENNA PLUS 2 TABLET(S): 8.6 TABLET ORAL at 22:02

## 2020-12-11 NOTE — CHART NOTE - FINDINGS AS BASED ON:
Comprehensive nutrition assessment and consultation/Food acceptance and intake status from observations by staff/Intervention secondary to interdisciplinary rounds

## 2020-12-11 NOTE — DISCHARGE NOTE PROVIDER - NSDCHHHOMEBOUND_GEN_ALL_CORE
Requires supervison due to deteriorating mental status.../Bed bound/Stroke/TBI (neurological/cognitive impairment)

## 2020-12-11 NOTE — DIETITIAN INITIAL EVALUATION ADULT. - PERTINENT MEDS FT
MEDICATIONS  (STANDING):  apixaban 5 milliGRAM(s) Oral every 12 hours  folic acid 1 milliGRAM(s) Oral daily  metoprolol tartrate 25 milliGRAM(s) Oral two times a day  pantoprazole    Tablet 40 milliGRAM(s) Oral before breakfast  potassium chloride   Powder 40 milliEquivalent(s) Oral once  senna 2 Tablet(s) Oral at bedtime  sertraline 25 milliGRAM(s) Oral daily    MEDICATIONS  (PRN):  melatonin 5 milliGRAM(s) Oral at bedtime PRN Sleep

## 2020-12-11 NOTE — PROGRESS NOTE ADULT - SUBJECTIVE AND OBJECTIVE BOX
PGY-1 Progress Note discussed with attending    PAGER #: [[phone]] TILL 5:00 PM  PLEASE CONTACT ON CALL TEAM:  - On Call Team (Please refer to Lucy) FROM 5:00 PM - 8:30PM  - Nightfloat Team FROM 8:30 -7:30 AM    CHIEF COMPLAINT & BRIEF HOSPITAL COURSE:    INTERVAL HPI/OVERNIGHT EVENTS:       REVIEW OF SYSTEMS:  CONSTITUTIONAL: No fever, weight loss, or fatigue  RESPIRATORY: No cough, wheezing, chills or hemoptysis; No shortness of breath  CARDIOVASCULAR: No chest pain, palpitations, dizziness, or leg swelling  GASTROINTESTINAL: No abdominal pain. No nausea, vomiting, or hematemesis; No diarrhea or constipation. No melena or hematochezia.  GENITOURINARY: No dysuria or hematuria, urinary frequency  NEUROLOGICAL: No headaches, memory loss, loss of strength, numbness, or tremors  SKIN: No itching, burning, rashes, or lesions     Vital Signs Last 24 Hrs  T(C): 36.4 (11 Dec 2020 16:03), Max: 36.9 (10 Dec 2020 23:54)  T(F): 97.6 (11 Dec 2020 16:03), Max: 98.4 (10 Dec 2020 23:54)  HR: 58 (11 Dec 2020 16:03) (58 - 66)  BP: 128/64 (11 Dec 2020 16:03) (117/58 - 133/70)  BP(mean): --  RR: 18 (11 Dec 2020 16:03) (18 - 18)  SpO2: 100% (11 Dec 2020 16:03) (99% - 100%)    PHYSICAL EXAMINATION:  GENERAL: NAD, well built  HEAD:  Atraumatic, Normocephalic  EYES:  conjunctiva and sclera clear  NECK: Supple, No JVD, Normal thyroid  CHEST/LUNG: Clear to auscultation. Clear to percussion bilaterally; No rales, rhonchi, wheezing, or rubs  HEART: Regular rate and rhythm; No murmurs, rubs, or gallops  ABDOMEN: Soft, Nontender, Nondistended; Bowel sounds present  NERVOUS SYSTEM:  Alert & Oriented X3,    EXTREMITIES:  2+ Peripheral Pulses, No clubbing, cyanosis, or edema  SKIN: warm dry                          12.7   3.63  )-----------( 157      ( 11 Dec 2020 07:42 )             40.2     12-11    142  |  109<H>  |  10  ----------------------------<  87  3.4<L>   |  24  |  0.58    Ca    9.7      11 Dec 2020 07:42  Phos  2.7     12-11  Mg     1.7     12-11    TPro  6.1  /  Alb  3.0<L>  /  TBili  0.7  /  DBili  x   /  AST  25  /  ALT  35  /  AlkPhos  67  12-11    LIVER FUNCTIONS - ( 11 Dec 2020 07:42 )  Alb: 3.0 g/dL / Pro: 6.1 g/dL / ALK PHOS: 67 U/L / ALT: 35 U/L DA / AST: 25 U/L / GGT: x           CARDIAC MARKERS ( 10 Dec 2020 07:38 )  0.039 ng/mL / x     / x     / x     / x      CARDIAC MARKERS ( 10 Dec 2020 00:11 )  0.024 ng/mL / x     / x     / x     / x          PT/INR - ( 10 Dec 2020 00:11 )   PT: 14.3 sec;   INR: 1.21 ratio         PTT - ( 10 Dec 2020 00:11 )  PTT:31.8 sec    CAPILLARY BLOOD GLUCOSE      RADIOLOGY & ADDITIONAL TESTS:                   PGY-1 Progress Note discussed with attending    PAGER #: [[phone]] TILL 5:00 PM  PLEASE CONTACT ON CALL TEAM:  - On Call Team (Please refer to Lucy) FROM 5:00 PM - 8:30PM  - Nightfloat Team FROM 8:30 -7:30 AM    CHIEF COMPLAINT & BRIEF HOSPITAL COURSE: 75 year old female with medical history of iliac vein thrombosis, CVA  in past was sent to ED because patient ws not eating for 2 days . Patient is AAO x 0.  Spoke to patients Grand son he told that patient has base line dementia , she had stroke in March , in July she had iliac vein thrombosis, went to [facility] .she was there for 3 months and had fracture of right arm . she was send to [facility] and from hospital she was discharged at home with HHA 5hr/day for 5 days as family doesn't want to send her to NH or [facility]. Her HHA was increased to 7 hr/ 7days in wk. Maryan is the daughter who takes care of mom mostly Pt is bedbound , moved to chair sometimes by family.     Primary team spoke to the daughter Maryan Phone #[phone]. She mentioned that for day to day update contact grandson. Pt is under her grandson insurance but for any consent form contact to the Daughter Maryan.        INTERVAL HPI/OVERNIGHT EVENTS: No active issue overnight. Pt assessed at bedside through the  ID: [id number]. AAOX0. Was able to say her name.       REVIEW OF SYSTEMS:  Unable to assess , Pt speaks very slow ,  was unable to understand few of her answers    Vital Signs Last 24 Hrs  T(C): 36.4 (11 Dec 2020 16:03), Max: 36.9 (10 Dec 2020 23:54)  T(F): 97.6 (11 Dec 2020 16:03), Max: 98.4 (10 Dec 2020 23:54)  HR: 58 (11 Dec 2020 16:03) (58 - 66)  BP: 128/64 (11 Dec 2020 16:03) (117/58 - 133/70)  BP(mean): --  RR: 18 (11 Dec 2020 16:03) (18 - 18)  SpO2: 100% (11 Dec 2020 16:03) (99% - 100%)    PHYSICAL EXAMINATION:  GENERAL: NAD,   HEAD:  Atraumatic, Normocephalic  EYES:  conjunctiva and sclera clear  NECK: Supple, No JVD, Normal thyroid  CHEST/LUNG: Clear to auscultation. Clear to percussion bilaterally; No rales, rhonchi, wheezing, or rubs  HEART: Regular rate and rhythm; No murmurs, rubs, or gallops  ABDOMEN: Soft, Nontender, Nondistended; Bowel sounds present  NERVOUS SYSTEM:  Alert & Oriented X0, right sided weakness , 0/5 strength   EXTREMITIES:  2+ Peripheral Pulses, No clubbing, cyanosis, or edema  SKIN: warm dry                          12.7   3.63  )-----------( 157      ( 11 Dec 2020 07:42 )             40.2     12-11    142  |  109<H>  |  10  ----------------------------<  87  3.4<L>   |  24  |  0.58    Ca    9.7      11 Dec 2020 07:42  Phos  2.7     12-11  Mg     1.7     12-11    TPro  6.1  /  Alb  3.0<L>  /  TBili  0.7  /  DBili  x   /  AST  25  /  ALT  35  /  AlkPhos  67  12-11    LIVER FUNCTIONS - ( 11 Dec 2020 07:42 )  Alb: 3.0 g/dL / Pro: 6.1 g/dL / ALK PHOS: 67 U/L / ALT: 35 U/L DA / AST: 25 U/L / GGT: x           CARDIAC MARKERS ( 10 Dec 2020 07:38 )  0.039 ng/mL / x     / x     / x     / x      CARDIAC MARKERS ( 10 Dec 2020 00:11 )  0.024 ng/mL / x     / x     / x     / x          PT/INR - ( 10 Dec 2020 00:11 )   PT: 14.3 sec;   INR: 1.21 ratio         PTT - ( 10 Dec 2020 00:11 )  PTT:31.8 sec    CAPILLARY BLOOD GLUCOSE      RADIOLOGY & ADDITIONAL TESTS:

## 2020-12-11 NOTE — PROGRESS NOTE ADULT - PROBLEM SELECTOR PLAN 1
-patient came in with not eating for two days.   -Labs showed leukopenia  and hypokalemia. Potassium replaced.  -nutrition consulted  - PT >home  -CT head neg  -Pt with hx of old fracture in right arm, f/u with right arm xray  - Speech and Swallow > nectar thin liquid -patient came in with not eating for two days.   -Labs showed leukopenia  and hypokalemia. Potassium replaced.  -nutrition consulted  - PT >home  -CT head neg  -Pt with loss of appetite likely in the setting of worsening dementia  -Pt with hx of old fracture in right arm, xray showed right humeral neck fracture,   - Speech and Swallow > pureed thin liquid  -Ortho consulted

## 2020-12-11 NOTE — CONSULT NOTE ADULT - SUBJECTIVE AND OBJECTIVE BOX
JACEY BROWN  [id number]    Orthopedic Consult:  Right proximal humerus fracture     Orthopedic Diagnosis: Right proximal humerus fracture     JACEY [id number]  HPI:  76 y/o F, from home, RHD, with a PMHx of CVA (march 2020) with residual right sided UE&LE weakness admitted to [facility] for failure to thrive, ortho consulted for right proximal humerus fx. Patient is confused, unable to obtain a reliable history from patient, GrandRemigio Downing was contacted who provided the history. He states that in March of this year the patient had a stroke with right sided residual weakness, was sent to rehab and subsequently had a fall while at rehab in the beginning of October. He states that after the fall patient was brought into [place] [facility] for evaluation, was diagnosed with a "fracture" and was discharged back home with a sling for her right arm.     PAST MEDICAL & SURGICAL HISTORY:  H/O iliac vein thrombosis    FAMILY HISTORY:      Social History:  Patient is from home denies tobacco, Alcohol or illcit drug abuse. (10 Dec 2020 02:58)    Allergies    No Known Allergies    Intolerances    Home Medications:  folic acid 1 mg oral tablet: 1 tab(s) orally  (10 Dec 2020 03:40)  metoprolol tartrate 50 mg oral tablet: 1 tab(s) orally 2 times a day (10 Dec 2020 03:40)  pantoprazole 40 mg oral delayed release tablet: 1 tab(s) orally once a day (10 Dec 2020 03:40)  senna oral tablet: 2 tab(s) orally once a day (at bedtime) (10 Dec 2020 03:40)  sertraline 25 mg oral tablet: 1 tab(s) orally once a day (at bedtime) (10 Dec 2020 03:40)      Vital Signs Last 24 Hrs  T(C): 36.4 (11 Dec 2020 16:03), Max: 36.9 (10 Dec 2020 23:54)  T(F): 97.6 (11 Dec 2020 16:03), Max: 98.4 (10 Dec 2020 23:54)  HR: 58 (11 Dec 2020 16:03) (58 - 66)  BP: 128/64 (11 Dec 2020 16:03) (117/58 - 133/70)  BP(mean): --  RR: 18 (11 Dec 2020 16:03) (18 - 18)  SpO2: 100% (11 Dec 2020 16:03) (99% - 100%)  I&O's Summary      Physical Exam:  General: NAD, resting comfortably  Musculoskeletal:  Right shoulder: Skin warm and pink. No ecchymosis. No abrasions/lesions. No obvious deformity noted. Shoulder NTTP. Patient unable to perform active ROM of right shoulder/elbow/wrist/hand 2/2 stroke.  Upper extremities: Compartments soft and compressible. 2+ radial pulses b/l.    Labs:                        12.7   3.63  )-----------( 157      ( 11 Dec 2020 07:42 )             40.2     12-11    142  |  109<H>  |  10  ----------------------------<  87  3.4<L>   |  24  |  0.58    Ca    9.7      11 Dec 2020 07:42  Phos  2.7     12-11  Mg     1.7     12-11    TPro  6.1  /  Alb  3.0<L>  /  TBili  0.7  /  DBili  x   /  AST  25  /  ALT  35  /  AlkPhos  67  12-11    PT/INR - ( 10 Dec 2020 00:11 )   PT: 14.3 sec;   INR: 1.21 ratio         PTT - ( 10 Dec 2020 00:11 )  PTT:31.8 sec    Radiology:  EXAM:  FOREARM RIGHT                        EXAM:  SHOULDER RIGHT (MINIMUM 2V)                          PROCEDURE DATE:  12/11/2020      INTERPRETATION:  CLINICAL STATEMENT: Right arm fracture    TECHNIQUE: AP and Y views of right shoulder.2 views right forearm    COMPARISON: Chest x-ray 12/9/2020    FINDINGS:  Suggestion of fracture humeral head/neck region on the Y view. CT right shoulder recommended for better evaluation    No dislocation. Questionable subluxation.    The acromioclavicular joint is intact.      IMPRESSION:  Findings as described above.      Impression: Patient is a 75yFemale with Right proximal humerus fracture   Plan:  - Recommendation: [X ] Conservative management [  ] Surgical intervention  - Pain management  - DVT prophylaxis  - Daily PT - WBAT to RUE   > Patient placed in sling for comfort  - Patient is to Follow-Up with Dr. Osman in office at [phone]   -  Case d/w Dr. Osman

## 2020-12-11 NOTE — DIETITIAN INITIAL EVALUATION ADULT. - PERTINENT LABORATORY DATA
12-11 Na142 mmol/L Glu 87 mg/dL K+ 3.4 mmol/L<L> Cr  0.58 mg/dL BUN 10 mg/dL 12-11 Phos 2.7 mg/dL 12-11 Alb 3.0 g/dL<L> 12-10 Chol 180 mg/dL LDL --    HDL 39 mg/dL<L> Trig 87 mg/dL

## 2020-12-11 NOTE — DISCHARGE NOTE PROVIDER - HOSPITAL COURSE
5 year old female with medical history of iliac vein thrombosis on Eliquis , dementia and CVA (right sided residual weakness)  in past, from home had HHA 7hr/day 7 days in a week, was sent to ED because patient was not eating for 2 days . Patient is AAO x 1. Pt had leukopenia on admission 2.75 improved to 3.63 later in the course. Pt with normal folate and vitamin B12. Pt was assessed by in house physical therapy which recommended home without any skilful PT. Speech and Swallow recommended diet pureed, thin liquid. Pt with loss of appetite , assessed by dietician , only took few spoons of food during assessment.      75 year old female with medical history of iliac vein thrombosis on Eliquis , dementia and CVA (right sided residual weakness)  in past, from home had HHA 7hr/day 7 days in a week, was sent to ED because patient was not eating for 2 days . Patient is AAO x 1. Pt had leukopenia on admission 2.75 improved to 3.63 later in the course. Pt with normal folate and vitamin B12. Pt was assessed by in house physical therapy which recommended home without any skilful PT. Speech and Swallow recommended diet pureed, thin liquid. Pt with loss of appetite , assessed by dietician , only took few spoons of food during assessment.   Patient was noted to have right humerus fracture, ortho was consulted.      75 year old female with medical history of iliac vein thrombosis on Eliquis , dementia and CVA (right sided residual weakness)  in past, from home had HHA 7hr/day 7 days in a week, was sent to ED because patient was not eating for 2 days . Patient is AAO x 1. Pt had leukopenia on admission 2.75 normalized later in the course. Pt with normal folate and vitamin B12. Pt was assessed by in house physical therapy which recommended home without any skilful PT. Speech and Swallow recommended diet pureed, thin liquid. Pt with loss of appetite , assessed by dietician , only took few spoons of food during assessment.   Patient was noted to have right humerus fracture, ortho was consulted which recommended conservative management.      76 y/o F with medical history of iliac vein thrombosis on Eliquis, dementia and CVA (right sided residual weakness) in past, from home had HHA 7hr/day 7 days in a week, was sent to ED because patient was not eating for 2 days . Patient is AAO x 1. Pt had leukopenia on admission 2.75 normalized later in the course. Pt with normal folate and vitamin B12. Pt was assessed by in house physical therapy which recommended home without any skilful PT. Speech and Swallow recommended diet pureed, thin liquid. Pt with loss of appetite , assessed by dietician , only took few spoons of food during assessment.   Patient was noted to have right humerus fracture, ortho was consulted which recommended conservative management.     ___ with medical history significant for ___  was brought in s/p ____ . Patient reports ___ . She is AAOx __  and responding appropriately to questions.   ED COURSE:  INPATIENT W/U:  __ workup was      PT evaluated and recommended that ____  She had ___ insight about her health and she gave consent for her cousin to make health related decisions.   Palliative was consulted, and [facility] conversation with family resulted in ___ .     Given patient's improved clinical status and current hemodynamic stability, decision was made to discharge after discussing with attending physician.   Please refer to patient's complete medical chart with documents for a full hospital course, for this is only a brief summary.     76 y/o F with medical history of iliac vein thrombosis on Eliquis, dementia and CVA (right sided residual weakness) in past, from home had HHA 7hr/day 7 days in a week, was sent to ED because patient was not eating for 2 days . Patient is AAO x 1. Pt had leukopenia on admission 2.75 normalized later in the course. Pt with normal folate and vitamin B12. Assessed by Speech and Swallow who recommended diet pureed, thin liquid. Pt has loss of appetite, and was assessed by dietician. She only took a few spoons of food during assessment. Patient was noted to have a right humerus fracture, and ortho was consulted which recommended conservative management. She was investigated and found to have FTT likely due to progression of dementia and GOC conversation resulted in full code status. She was continued on eliquis for blood thinner for her iliac vein thrombosis. She had poor insight about her health and her daughter was her HCP for health related decisions. Given patient's improved clinical status and current hemodynamic stability, decision was made to discharge after discussing with attending physician. Please refer to patient's complete medical chart with documents for a full hospital course, for this is only a brief summary.    Due to severe progression of dementia, patient requires heavily involved care including monitoring feeds for dysphagia/ aspiration prevention, frequent turning to prevent skin breakdown, and wound care to prevent infection.             76 y/o F with medical history of iliac vein thrombosis on Eliquis, dementia and CVA (right sided residual weakness) in past, from home had HHA 7hr/day 7 days in a week, was sent to ED because patient was not eating for 2 days . Patient is AAO x 1. Pt had leukopenia on admission 2.75 normalized later in the course. Pt with normal folate and vitamin B12. Assessed by Speech and Swallow who recommended diet pureed, thin liquid. Pt has loss of appetite, and was assessed by dietician. She only took a few spoons of food during assessment. Patient was noted to have a right humerus fracture, and ortho was consulted which recommended conservative management. She was investigated and found to have FTT likely due to progression of dementia and GOC conversation resulted in full code status. She was continued on eliquis for blood thinner for her iliac vein thrombosis. She had poor insight about her health and her daughter was her HCP for health related decisions.     Due to severe progression of dementia, patient requires heavily involved care including monitoring feeds for dysphagia/ aspiration prevention, frequent turning to prevent skin breakdown, and wound care to prevent infection.      Given patient's improved clinical status and current hemodynamic stability, decision was made to discharge after discussing with attending physician. Patient to be discharged home with home health aid services. Please refer to patient's complete medical chart with documents for a full hospital course, for this is only a brief summary.

## 2020-12-11 NOTE — DIETITIAN INITIAL EVALUATION ADULT. - OTHER INFO
Patient from home lives with family. Visited pt. very lethargic, d/w PCA reports pt. with poor po intake, consumed ~4-teaspoons of scr. eggs/oatmeal at breakfast today & refusing, contacted daughter - Maryan, reports pt. usually with "normal" appetite but past 2-3 very poor oral intake, refusing solid foods or liquids foods & became more confused & weaker, stated pt. need feeding assistance & on "mashed/pureed meals at home, denies nausea/vomiting or diarrhea PTA. Per daughter, noted pt. loss ~40 Lbs >4months ago & recently d/gilbert from nursing home. Speech/Swallow evaluation conducted on 12/10 & recommendation noted, d/w PCA, encourage po intake/hydration, bruised skin, no edema, d/w MD. Patient from home lives with family. Visited pt. very lethargic, d/w PCA reports pt. with poor po intake, consumed ~4-teaspoons of scr. eggs/oatmeal at breakfast today & refusing, contacted daughter - Maryan, reports pt. usually with "normal" appetite but past 2-3 very poor oral intake, refusing solid foods or liquids & became more confused & weaker, stated pt. need feeding assistance & on "mashed/pureed meals at home, denies nausea/vomiting or diarrhea PTA. Per daughter, noted pt. loss ~40 Lbs >4months ago & recently d/gilbert from nursing home. Speech/Swallow evaluation conducted on 12/10 & recommendation noted, d/w PCA, encourage po intake/hydration, bruised skin, no edema, d/w MD. Patient from home lives with family. Visited pt. very lethargic, d/w PCA reports pt. with poor po intake, consumed ~4-teaspoons of scr. eggs/oatmeal at breakfast today & refusing, contacted daughter - Maryan, reports pt. usually with "normal" appetite but past 2-3 very poor oral intake, refusing solid foods or liquids & became more confused with weakness, stated pt. need feeding assistance & on "mashed/pureed meals at home, denies nausea/vomiting or diarrhea PTA. Per daughter, noted pt. loss ~40 Lbs >4months ago & recently d/gilbert from nursing home. Speech/Swallow evaluation conducted on 12/10 & recommendation noted, d/w PCA, encourage po intake/hydration, bruised skin, no edema, d/w MD.

## 2020-12-11 NOTE — DISCHARGE NOTE PROVIDER - NSDCCPCAREPLAN_GEN_ALL_CORE_FT
PRINCIPAL DISCHARGE DIAGNOSIS  Diagnosis: Failure to thrive in adult  Assessment and Plan of Treatment: You came with not eating and drinking for 2 days. Your BCx and Ucx were negative. Your CT head was negative for any bleeding. Most likely it was due to your worsening dementia.      SECONDARY DISCHARGE DIAGNOSES  Diagnosis: Fracture of anatomical neck of right humerus  Assessment and Plan of Treatment: You had fracture of right arm in nursing home previously. In house ortho doctor saw you and recommended for conservatove management.    Diagnosis: H/O iliac vein thrombosis  Assessment and Plan of Treatment: Continue to take your home medications    Diagnosis: CVA (cerebral vascular accident)  Assessment and Plan of Treatment: Continue to take your home medication    Diagnosis: Depression  Assessment and Plan of Treatment: Continue to take your home medication     PRINCIPAL DISCHARGE DIAGNOSIS  Diagnosis: Failure to thrive in adult  Assessment and Plan of Treatment: You came with not eating and drinking for 2 days. Your blood culture, and Urine Culture were negative. Your CT head was negative for any bleeding. This is likely due to worsening of your dementia. A goals of care conversation was started and resulted in a full code status .      SECONDARY DISCHARGE DIAGNOSES  Diagnosis: Fracture of anatomical neck of right humerus  Assessment and Plan of Treatment: You had a fracture of your right arm in the nursing home previously. You were seen by orthopedics, and recommended for conservative management. Your arm was placed in a sling and you were treated for the pain.    Diagnosis: CVA (cerebral vascular accident)  Assessment and Plan of Treatment: You previously had a stroke which is a blood clot in your brain cutting off blood supply to your neurons. You have residual deficits from this stroke, and difficulty eating food/ swallowing/ and are bedbound. You need involved care to assist you in feeding and turning in order to prevent skin breakdown and infections. You have a home health aide and may need increased hours of services in order to meet the full extent of your care.    Diagnosis: H/O iliac vein thrombosis  Assessment and Plan of Treatment: You previously had a iliac vein blood clot due to compression of the vein and stagnation of blood flow. For this, you were started on blood thinner medication called Eliquis.  Please continue to take your home medications, and seek medical attention if you have signs of excessive bleeding such as blood in your urine or darkening of your stool.    Diagnosis: Depression  Assessment and Plan of Treatment: You have depression which is a mood disorder which results in insomnia, loss of interest, guilt, loss of energy, loss of concentration, loss of appetite, and general slowing. You were previously started on medications which you are advised to continue. Please seek medical attention if you have thoughts of suicide.        PRINCIPAL DISCHARGE DIAGNOSIS  Diagnosis: Failure to thrive in adult  Assessment and Plan of Treatment: You came with not eating and drinking for 2 days. Your blood culture, and Urine Culture were negative. Your CT head was negative for any bleeding. This is likely due to worsening of your dementia. A goals of care conversation was started and resulted in a full code status .  Due to severe progression of dementia, you require heavily involved care including monitoring feeds for dysphagia/ aspiration prevention, frequent turning to prevent skin breakdown, and wound care to prevent infection.      SECONDARY DISCHARGE DIAGNOSES  Diagnosis: Fracture of anatomical neck of right humerus  Assessment and Plan of Treatment: You had a fracture of your right arm in the nursing home previously. You were seen by orthopedics, and recommended for conservative management. Your arm was placed in a sling and you were treated for the pain.    Diagnosis: CVA (cerebral vascular accident)  Assessment and Plan of Treatment: You previously had a stroke which is a blood clot in your brain cutting off blood supply to your neurons. You have residual deficits from this stroke, and difficulty eating food/ swallowing/ and are bedbound. You need involved care to assist you in feeding and turning in order to prevent skin breakdown and infections. You require heavily involved care including monitoring feeds for dysphagia/ aspiration prevention, frequent turning to prevent skin breakdown, and wound care to prevent infection.  You have a home health aide and may need increased hours of services in order to meet the full extent of your care.    Diagnosis: H/O iliac vein thrombosis  Assessment and Plan of Treatment: You previously had a iliac vein blood clot due to compression of the vein and stagnation of blood flow. For this, you were started on blood thinner medication called Eliquis.  Please continue to take your home medications, and seek medical attention if you have signs of excessive bleeding such as blood in your urine or darkening of your stool.    Diagnosis: Depression  Assessment and Plan of Treatment: You have depression which is a mood disorder which results in insomnia, loss of interest, guilt, loss of energy, loss of concentration, loss of appetite, and general slowing. You were previously started on medications which you are advised to continue. Please seek medical attention if you have thoughts of suicide.

## 2020-12-11 NOTE — CHART NOTE - NSCHARTNOTEFT_GEN_A_CORE
Primary team spoke to the daughter Maryan Phone #[phone] though  ID: [id number] and discussed the patient current condition. She has loss of appetite most likely due to her worsening dementia. Discussed that since pt has dementia she would benefited if show would be in a familiar environment with a family person who can feed her. Other option is PEG tube which has its own risk and benefits. Daughter endorsed that she would discuss with the family (her siblings). Dr. Pryor has been consulted today. Daughter was informed that Palliative team will reach out to her next week for further discussion.

## 2020-12-11 NOTE — CHART NOTE - TREATMENT: THE FOLLOWING DIET HAS BEEN RECOMMENDED
Diet, Dysphagia 1 Pureed-Thin Liquids:   Supplement Feeding Modality:  Oral  Ensure Enlive Servings Per Day:  1       Frequency:  Three Times a day (12-11-20 @ 11:23) [Pending Verification By Attending]  Diet, Dysphagia 1 Pureed-Thin Liquids (12-10-20 @ 16:15) [Active]

## 2020-12-11 NOTE — DIETITIAN INITIAL EVALUATION ADULT. - PHYSICAL ASSESSMENT ORBITAL
Post-Care Instructions: I reviewed with the patient in detail post-care instructions. Patient is to wear sunprotection, and avoid picking at any of the treated lesions. Pt may apply Vaseline to crusted or scabbing areas. Medical Necessity Clause: This procedure was medically necessary because the lesions that were treated were: Include Z78.9 (Other Specified Conditions Influencing Health Status) As An Associated Diagnosis?: No Anesthesia Volume In Cc: 0.5 Total Number Of Lesions Treated: 4 Consent: The patient's consent was obtained including but not limited to risks of crusting, scabbing, blistering, scarring, darker or lighter pigmentary change, recurrence, incomplete removal and infection. Detail Level: Zone Medical Necessity Information: It is in your best interest to select a reason for this procedure from the list below. All of these items fulfill various CMS LCD requirements except the new and changing color options. moderate

## 2020-12-11 NOTE — DIETITIAN INITIAL EVALUATION ADULT. - SIGNS/SYMPTOMS
Poor oral intake x2-3days w/25% wt.loss >4months, muscle loss, intake <15%, per MD failure to thrive

## 2020-12-11 NOTE — DIETITIAN INITIAL EVALUATION ADULT. - FACTORS AFF FOOD INTAKE
weakness/lethargic, CVA, h/o iliac vein thrombosis, failure to thrive/other (specify)/change in mental status/difficulty with food procurement/preparation/difficulty chewing/difficulty feeding self/persistent lack of appetite

## 2020-12-11 NOTE — DISCHARGE NOTE PROVIDER - NSDCMRMEDTOKEN_GEN_ALL_CORE_FT
apixaban 5 mg oral tablet: 1 tab(s) orally 2 times a day ,   folic acid 1 mg oral tablet: 1 tab(s) orally   metoprolol tartrate 50 mg oral tablet: 1 tab(s) orally 2 times a day  pantoprazole 40 mg oral delayed release tablet: 1 tab(s) orally once a day  senna oral tablet: 2 tab(s) orally once a day (at bedtime)  sertraline 25 mg oral tablet: 1 tab(s) orally once a day (at bedtime)

## 2020-12-12 LAB
ANION GAP SERPL CALC-SCNC: 7 MMOL/L — SIGNIFICANT CHANGE UP (ref 5–17)
BUN SERPL-MCNC: 13 MG/DL — SIGNIFICANT CHANGE UP (ref 7–18)
CALCIUM SERPL-MCNC: 9.5 MG/DL — SIGNIFICANT CHANGE UP (ref 8.4–10.5)
CHLORIDE SERPL-SCNC: 110 MMOL/L — HIGH (ref 96–108)
CO2 SERPL-SCNC: 27 MMOL/L — SIGNIFICANT CHANGE UP (ref 22–31)
CREAT SERPL-MCNC: 0.54 MG/DL — SIGNIFICANT CHANGE UP (ref 0.5–1.3)
GLUCOSE SERPL-MCNC: 89 MG/DL — SIGNIFICANT CHANGE UP (ref 70–99)
POTASSIUM SERPL-MCNC: 3.5 MMOL/L — SIGNIFICANT CHANGE UP (ref 3.5–5.3)
POTASSIUM SERPL-SCNC: 3.5 MMOL/L — SIGNIFICANT CHANGE UP (ref 3.5–5.3)
SODIUM SERPL-SCNC: 144 MMOL/L — SIGNIFICANT CHANGE UP (ref 135–145)

## 2020-12-12 PROCEDURE — 99232 SBSQ HOSP IP/OBS MODERATE 35: CPT

## 2020-12-12 RX ORDER — PANTOPRAZOLE SODIUM 20 MG/1
40 TABLET, DELAYED RELEASE ORAL
Refills: 0 | Status: DISCONTINUED | OUTPATIENT
Start: 2020-12-12 | End: 2020-12-15

## 2020-12-12 RX ADMIN — SERTRALINE 25 MILLIGRAM(S): 25 TABLET, FILM COATED ORAL at 11:44

## 2020-12-12 RX ADMIN — Medication 25 MILLIGRAM(S): at 06:00

## 2020-12-12 RX ADMIN — APIXABAN 5 MILLIGRAM(S): 2.5 TABLET, FILM COATED ORAL at 18:08

## 2020-12-12 RX ADMIN — Medication 1 MILLIGRAM(S): at 11:44

## 2020-12-12 RX ADMIN — PANTOPRAZOLE SODIUM 40 MILLIGRAM(S): 20 TABLET, DELAYED RELEASE ORAL at 06:00

## 2020-12-12 RX ADMIN — Medication 25 MILLIGRAM(S): at 18:08

## 2020-12-12 RX ADMIN — APIXABAN 5 MILLIGRAM(S): 2.5 TABLET, FILM COATED ORAL at 06:00

## 2020-12-12 RX ADMIN — SENNA PLUS 2 TABLET(S): 8.6 TABLET ORAL at 21:23

## 2020-12-12 NOTE — PROGRESS NOTE ADULT - SUBJECTIVE AND OBJECTIVE BOX
Patient is a 75y old  Female who presents with a chief complaint of Failure to thrive (11 Dec 2020 16:55)      INTERVAL HPI/OVERNIGHT EVENTS:  No overnight events. decreased oral intake    T(C): 36.2 (12-12-20 @ 08:02), Max: 36.5 (12-12-20 @ 00:28)  HR: 56 (12-12-20 @ 08:02) (56 - 65)  BP: 134/60 (12-12-20 @ 08:02) (128/64 - 136/62)  RR: 16 (12-12-20 @ 08:02) (16 - 18)  SpO2: 99% (12-12-20 @ 08:02) (99% - 100%)  Wt(kg): --  I&O's Summary      REVIEW OF SYSTEMS: unable to obtain meaningfully secondary to dementia    MEDICATIONS  (STANDING):  apixaban 5 milliGRAM(s) Oral every 12 hours  folic acid 1 milliGRAM(s) Oral daily  metoprolol tartrate 25 milliGRAM(s) Oral two times a day  pantoprazole   Suspension 40 milliGRAM(s) Oral before breakfast  senna 2 Tablet(s) Oral at bedtime  sertraline 25 milliGRAM(s) Oral daily    MEDICATIONS  (PRN):  melatonin 5 milliGRAM(s) Oral at bedtime PRN Sleep      PHYSICAL EXAM:  GENERAL: NAD, elderly female  HEAD:  Atraumatic, Normocephalic  NERVOUS SYSTEM:  Alert & Oriented X1-2,  right facial droop, right upper extremity in sling right side residual weakness,   CHEST/LUNG: Clear to percussion bilaterally; No rales, rhonchi, wheezing, or rubs  HEART: Regular rate and rhythm; No murmurs, rubs, or gallops  ABDOMEN: Soft, Nontender, Nondistended; Bowel sounds present  EXTREMITIES:  2+ Peripheral Pulses, No clubbing, cyanosis, or edema  LYMPH: No lymphadenopathy noted  SKIN: No rashes or lesions  LABS:                        12.7   3.63  )-----------( 157      ( 11 Dec 2020 07:42 )             40.2     12-12    144  |  110<H>  |  13  ----------------------------<  89  3.5   |  27  |  0.54    Ca    9.5      12 Dec 2020 07:44  Phos  2.7     12-11  Mg     1.7     12-11    TPro  6.1  /  Alb  3.0<L>  /  TBili  0.7  /  DBili  x   /  AST  25  /  ALT  35  /  AlkPhos  67  12-11        CAPILLARY BLOOD GLUCOSE

## 2020-12-12 NOTE — PROGRESS NOTE ADULT - ASSESSMENT
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 Up dated pts current clinical status, explained her poor overall prognosis and dementia trajectory. Initiated goals of care conversation, Full code  will follow up with her

## 2020-12-13 LAB — GLUCOSE BLDC GLUCOMTR-MCNC: 135 MG/DL — HIGH (ref 70–99)

## 2020-12-13 PROCEDURE — 99232 SBSQ HOSP IP/OBS MODERATE 35: CPT | Mod: GC

## 2020-12-13 RX ADMIN — Medication 1 MILLIGRAM(S): at 10:52

## 2020-12-13 RX ADMIN — SERTRALINE 25 MILLIGRAM(S): 25 TABLET, FILM COATED ORAL at 10:52

## 2020-12-13 RX ADMIN — Medication 25 MILLIGRAM(S): at 06:10

## 2020-12-13 RX ADMIN — Medication 5 MILLIGRAM(S): at 22:02

## 2020-12-13 RX ADMIN — SENNA PLUS 2 TABLET(S): 8.6 TABLET ORAL at 22:02

## 2020-12-13 RX ADMIN — APIXABAN 5 MILLIGRAM(S): 2.5 TABLET, FILM COATED ORAL at 06:10

## 2020-12-13 RX ADMIN — PANTOPRAZOLE SODIUM 40 MILLIGRAM(S): 20 TABLET, DELAYED RELEASE ORAL at 06:10

## 2020-12-13 NOTE — PROGRESS NOTE ADULT - PROBLEM SELECTOR PLAN 2
-Patient has hx of iliac vein thrombosis. CT scan on July 2020 showed .  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  -Patient on Eliquis 5 mg bid  will continue .

## 2020-12-13 NOTE — PROGRESS NOTE ADULT - SUBJECTIVE AND OBJECTIVE BOX
PGY-1 Progress Note discussed with attending    PAGER #: [[phone]] TILL 5:00 PM  PLEASE CONTACT ON CALL TEAM:  - On Call Team (Please refer to Lucy) FROM 5:00 PM - 8:30PM  - Nightfloat Team FROM 8:30 -7:30 AM    CHIEF COMPLAINT & BRIEF HOSPITAL COURSE:  75 year old female with medical history of iliac vein thrombosis, CVA  in past was sent to ED because patient ws not eating for 2 days . Patient is AAO x 0.  Spoke to patients Grand son he told that patient has base line dementia , she had stroke in March , in July she had iliac vein thrombosis, went to [facility] [place] .she was there for 3 months and had fracture of right arm . she was send to [facility] and from hospital she was discharged at home with HHA 5hr/day for 5 days as family doesn't want to send her to NH or [facility]. Her HHA was increased to 7 hr/ 7days in wk. Maryan is the daughter who takes care of mom mostly Pt is bedbound , moved to chair sometimes by family.     Primary team spoke to the daughter Maryan Phone #[phone]. She mentioned that for day to day update contact grandson. Pt is under her grandson insurance but for any consent form contact to the Daughter Maryan.      INTERVAL HPI/OVERNIGHT EVENTS:   Patient was examined while _ was lying in bed, Aox3, responding appropriately to questions, in NAD. _ has normal bowel and bladder movements, and denies any other acute complaints.     REVIEW OF SYSTEMS:  Unable to elicit     MEDICATIONS  (STANDING):  apixaban 5 milliGRAM(s) Oral every 12 hours  folic acid 1 milliGRAM(s) Oral daily  metoprolol tartrate 25 milliGRAM(s) Oral two times a day  pantoprazole   Suspension 40 milliGRAM(s) Oral before breakfast  senna 2 Tablet(s) Oral at bedtime  sertraline 25 milliGRAM(s) Oral daily    MEDICATIONS  (PRN):  melatonin 5 milliGRAM(s) Oral at bedtime PRN Sleep      Vital Signs Last 24 Hrs  T(C): 37.1 (12 Dec 2020 23:34), Max: 37.1 (12 Dec 2020 23:34)  T(F): 98.8 (12 Dec 2020 23:34), Max: 98.8 (12 Dec 2020 23:34)  HR: 71 (12 Dec 2020 23:34) (56 - 92)  BP: 138/72 (12 Dec 2020 23:34) (134/60 - 144/81)  BP(mean): --  RR: 16 (12 Dec 2020 23:34) (16 - 16)  SpO2: 98% (12 Dec 2020 23:34) (98% - 99%)    PHYSICAL EXAMINATION:  GENERAL: NAD, well built  HEAD:  Atraumatic, Normocephalic  EYES:  conjunctiva and sclera clear  NECK: Supple, No JVD, Normal thyroid  CHEST/LUNG: Clear to auscultation. Clear to percussion bilaterally; No rales, rhonchi, wheezing, or rubs  HEART: Regular rate and rhythm; No murmurs, rubs, or gallops  ABDOMEN: Soft, Nontender, Nondistended; Bowel sounds present  NERVOUS SYSTEM:  Alert & Oriented X0, right side Upper and lower extremities 0/5 strength, rest normal tone, power, reflexes.   EXTREMITIES:  2+ Peripheral Pulses, No clubbing, cyanosis, or edema  SKIN: warm dry                          12.7   3.63  )-----------( 157      ( 11 Dec 2020 07:42 )             40.2     12-12    144  |  110<H>  |  13  ----------------------------<  89  3.5   |  27  |  0.54    Ca    9.5      12 Dec 2020 07:44  Phos  2.7     12-11  Mg     1.7     12-11    TPro  6.1  /  Alb  3.0<L>  /  TBili  0.7  /  DBili  x   /  AST  25  /  ALT  35  /  AlkPhos  67  12-11    LIVER FUNCTIONS - ( 11 Dec 2020 07:42 )  Alb: 3.0 g/dL / Pro: 6.1 g/dL / ALK PHOS: 67 U/L / ALT: 35 U/L DA / AST: 25 U/L / GGT: x                   CAPILLARY BLOOD GLUCOSE      RADIOLOGY & ADDITIONAL TESTS:                   PGY-1 Progress Note discussed with attending    PAGER #: [[phone]] TILL 5:00 PM  PLEASE CONTACT ON CALL TEAM:  - On Call Team (Please refer to Lucy) FROM 5:00 PM - 8:30PM  - Nightfloat Team FROM 8:30 -7:30 AM      INTERVAL HPI/OVERNIGHT EVENTS:    ID [id number]  Patient was examined while she was lying in bed, Aox3, responding appropriately to questions, in NAD with her R shoulder in a sling. She has normal bowel and bladder movements, and denies any other acute complaints. She does complain of slight headache and upper abdominal pain.     REVIEW OF SYSTEMS:  CONSTITUTIONAL: No fever, weight loss, or fatigue  RESPIRATORY: No cough, wheezing, chills or hemoptysis; No shortness of breath  CARDIOVASCULAR: No chest pain, palpitations, dizziness, or leg swelling  GASTROINTESTINAL: Mild epigastric pain. No nausea, vomiting, or hematemesis; No diarrhea or constipation. No melena or hematochezia.  GENITOURINARY: No dysuria or hematuria, urinary frequency  NEUROLOGICAL: Mild headache +, No memory loss, loss of strength, numbness, or tremors  SKIN: No itching, burning, rashes, or lesions     MEDICATIONS  (STANDING):  apixaban 5 milliGRAM(s) Oral every 12 hours  folic acid 1 milliGRAM(s) Oral daily  metoprolol tartrate 25 milliGRAM(s) Oral two times a day  pantoprazole   Suspension 40 milliGRAM(s) Oral before breakfast  senna 2 Tablet(s) Oral at bedtime  sertraline 25 milliGRAM(s) Oral daily    MEDICATIONS  (PRN):  melatonin 5 milliGRAM(s) Oral at bedtime PRN Sleep      Vital Signs Last 24 Hrs  T(C): 37.1 (12 Dec 2020 23:34), Max: 37.1 (12 Dec 2020 23:34)  T(F): 98.8 (12 Dec 2020 23:34), Max: 98.8 (12 Dec 2020 23:34)  HR: 71 (12 Dec 2020 23:34) (56 - 92)  BP: 138/72 (12 Dec 2020 23:34) (134/60 - 144/81)  BP(mean): --  RR: 16 (12 Dec 2020 23:34) (16 - 16)  SpO2: 98% (12 Dec 2020 23:34) (98% - 99%)    PHYSICAL EXAMINATION:  GENERAL: NAD, well built  HEAD:  Atraumatic, Normocephalic  EYES:  conjunctiva and sclera clear  NECK: Supple, No JVD, Normal thyroid  CHEST/LUNG: Clear to auscultation. Clear to percussion bilaterally; No rales, rhonchi, wheezing, or rubs  HEART: Regular rate and rhythm; No murmurs, rubs, or gallops  ABDOMEN: Soft, Nontender, Nondistended; Bowel sounds present  NERVOUS SYSTEM:  Alert & Oriented X2-3, right side Upper and lower extremities 0/5 strength, rest normal tone, power, reflexes.   EXTREMITIES:  2+ Peripheral Pulses, No clubbing, cyanosis, or edema, R shoulder in sling  SKIN: warm dry                          12.7   3.63  )-----------( 157      ( 11 Dec 2020 07:42 )             40.2     12-12    144  |  110<H>  |  13  ----------------------------<  89  3.5   |  27  |  0.54    Ca    9.5      12 Dec 2020 07:44  Phos  2.7     12-11  Mg     1.7     12-11    TPro  6.1  /  Alb  3.0<L>  /  TBili  0.7  /  DBili  x   /  AST  25  /  ALT  35  /  AlkPhos  67  12-11    LIVER FUNCTIONS - ( 11 Dec 2020 07:42 )  Alb: 3.0 g/dL / Pro: 6.1 g/dL / ALK PHOS: 67 U/L / ALT: 35 U/L DA / AST: 25 U/L / GGT: x                   CAPILLARY BLOOD GLUCOSE      RADIOLOGY & ADDITIONAL TESTS:

## 2020-12-13 NOTE — PROGRESS NOTE ADULT - ATTENDING COMMENTS
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 Up dated pts current clinical status, explained her poor overall prognosis and dementia trajectory. Initiated goals of care conversation, Full code  will follow up with her  DC planning, Disposition: Home

## 2020-12-13 NOTE — PROGRESS NOTE ADULT - PROBLEM SELECTOR PLAN 1
-patient came in with not eating for two days.   -Labs showed leukopenia  and hypokalemia. Potassium replaced.  -nutrition consulted  - PT >home  -CT head neg  -Pt with loss of appetite likely in the setting of worsening dementia  -Pt with hx of old fracture in right arm, xray showed right humeral neck fracture,   - Speech and Swallow > pureed thin liquid  -Ortho consulted

## 2020-12-14 LAB
ALBUMIN SERPL ELPH-MCNC: 3.2 G/DL — LOW (ref 3.5–5)
ALP SERPL-CCNC: 68 U/L — SIGNIFICANT CHANGE UP (ref 40–120)
ALT FLD-CCNC: 38 U/L DA — SIGNIFICANT CHANGE UP (ref 10–60)
ANION GAP SERPL CALC-SCNC: 7 MMOL/L — SIGNIFICANT CHANGE UP (ref 5–17)
AST SERPL-CCNC: 26 U/L — SIGNIFICANT CHANGE UP (ref 10–40)
BILIRUB SERPL-MCNC: 0.6 MG/DL — SIGNIFICANT CHANGE UP (ref 0.2–1.2)
BUN SERPL-MCNC: 24 MG/DL — HIGH (ref 7–18)
CALCIUM SERPL-MCNC: 9.4 MG/DL — SIGNIFICANT CHANGE UP (ref 8.4–10.5)
CHLORIDE SERPL-SCNC: 108 MMOL/L — SIGNIFICANT CHANGE UP (ref 96–108)
CO2 SERPL-SCNC: 29 MMOL/L — SIGNIFICANT CHANGE UP (ref 22–31)
CREAT SERPL-MCNC: 0.61 MG/DL — SIGNIFICANT CHANGE UP (ref 0.5–1.3)
GLUCOSE SERPL-MCNC: 91 MG/DL — SIGNIFICANT CHANGE UP (ref 70–99)
HCT VFR BLD CALC: 42.6 % — SIGNIFICANT CHANGE UP (ref 34.5–45)
HGB BLD-MCNC: 13.2 G/DL — SIGNIFICANT CHANGE UP (ref 11.5–15.5)
MAGNESIUM SERPL-MCNC: 2 MG/DL — SIGNIFICANT CHANGE UP (ref 1.6–2.6)
MCHC RBC-ENTMCNC: 29.7 PG — SIGNIFICANT CHANGE UP (ref 27–34)
MCHC RBC-ENTMCNC: 31 GM/DL — LOW (ref 32–36)
MCV RBC AUTO: 95.9 FL — SIGNIFICANT CHANGE UP (ref 80–100)
NRBC # BLD: 0 /100 WBCS — SIGNIFICANT CHANGE UP (ref 0–0)
PHOSPHATE SERPL-MCNC: 2.7 MG/DL — SIGNIFICANT CHANGE UP (ref 2.5–4.5)
PLATELET # BLD AUTO: 165 K/UL — SIGNIFICANT CHANGE UP (ref 150–400)
POTASSIUM SERPL-MCNC: 3.3 MMOL/L — LOW (ref 3.5–5.3)
POTASSIUM SERPL-SCNC: 3.3 MMOL/L — LOW (ref 3.5–5.3)
PROT SERPL-MCNC: 6.4 G/DL — SIGNIFICANT CHANGE UP (ref 6–8.3)
RBC # BLD: 4.44 M/UL — SIGNIFICANT CHANGE UP (ref 3.8–5.2)
RBC # FLD: 14.5 % — SIGNIFICANT CHANGE UP (ref 10.3–14.5)
SODIUM SERPL-SCNC: 144 MMOL/L — SIGNIFICANT CHANGE UP (ref 135–145)
WBC # BLD: 3.78 K/UL — LOW (ref 3.8–10.5)
WBC # FLD AUTO: 3.78 K/UL — LOW (ref 3.8–10.5)

## 2020-12-14 PROCEDURE — 99232 SBSQ HOSP IP/OBS MODERATE 35: CPT | Mod: GC

## 2020-12-14 RX ORDER — POTASSIUM CHLORIDE 20 MEQ
40 PACKET (EA) ORAL EVERY 6 HOURS
Refills: 0 | Status: COMPLETED | OUTPATIENT
Start: 2020-12-14 | End: 2020-12-14

## 2020-12-14 RX ADMIN — Medication 25 MILLIGRAM(S): at 05:57

## 2020-12-14 RX ADMIN — APIXABAN 5 MILLIGRAM(S): 2.5 TABLET, FILM COATED ORAL at 05:57

## 2020-12-14 RX ADMIN — APIXABAN 5 MILLIGRAM(S): 2.5 TABLET, FILM COATED ORAL at 17:43

## 2020-12-14 RX ADMIN — PANTOPRAZOLE SODIUM 40 MILLIGRAM(S): 20 TABLET, DELAYED RELEASE ORAL at 05:56

## 2020-12-14 RX ADMIN — SERTRALINE 25 MILLIGRAM(S): 25 TABLET, FILM COATED ORAL at 13:36

## 2020-12-14 RX ADMIN — Medication 40 MILLIEQUIVALENT(S): at 17:43

## 2020-12-14 RX ADMIN — Medication 25 MILLIGRAM(S): at 17:43

## 2020-12-14 RX ADMIN — Medication 1 MILLIGRAM(S): at 13:37

## 2020-12-14 RX ADMIN — Medication 40 MILLIEQUIVALENT(S): at 13:37

## 2020-12-14 NOTE — PROGRESS NOTE ADULT - ATTENDING COMMENTS
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      A/P  Failure to thrive -oral intake is satisfactory  Leukopenia improving- no evidence of infection  Severe protein calorie malnutrition  Debility  Dementia  H/o Iliac vein thrombosis secondary to May Thurner syndrome  Recent CVA  March 20202 with residual weakness  Hypokalemia s/p repletion  Right humeral fracture on a sling    Plan  Patient is medically stable to discharge home with [facility]  Goals of care: Full code

## 2020-12-14 NOTE — PROGRESS NOTE ADULT - PROBLEM SELECTOR PLAN 6
-Daughter Maryan was reached out for goals of care on day 1 of admission. wanted her to be Full Code  -Today GOC was discussed regarding PEG tube, daughter will discuss with the family  -Palliative consulted today

## 2020-12-14 NOTE — PROGRESS NOTE ADULT - SUBJECTIVE AND OBJECTIVE BOX
PGY-1 Progress Note discussed with attending    PAGER #: [[phone]] TILL 5:00 PM  PLEASE CONTACT ON CALL TEAM:  - On Call Team (Please refer to Lucy) FROM 5:00 PM - 8:30PM  - Nightfloat Team FROM 8:30 -7:30 AM    CHIEF COMPLAINT & BRIEF HOSPITAL COURSE:  75 year old female with medical history of iliac vein thrombosis, CVA  in past was sent to ED because patient ws not eating for 2 days . Patient is AAO x 0.  Spoke to patients Grand son he told that patient has base line dementia , she had stroke in March , in July she had iliac vein thrombosis, went to [facility] [place] .she was there for 3 months and had fracture of right arm . she was send to [facility] and from hospital she was discharged at home with HHA 5hr/day for 5 days as family doesn't want to send her to NH or [facility]. Her HHA was increased to 7 hr/ 7days in wk. Maryan is the daughter who takes care of mom mostly Pt is bedbound , moved to chair sometimes by family.     Primary team spoke to the daughter Maryan Phone #[phone]. She mentioned that for day to day update contact grandson. Pt is under her grandson insurance but for any consent form contact to the Daughter Maryan.      INTERVAL HPI/OVERNIGHT EVENTS:   Patient was examined while  was lying in bed, Aox3, responding appropriately to questions, in NAD. _ has normal bowel and bladder movements, and denies any other acute complaints.   Medically stable for discharge     REVIEW OF SYSTEMS:  CONSTITUTIONAL: No fever, weight loss, or fatigue  RESPIRATORY: No cough, wheezing, chills or hemoptysis; No shortness of breath  CARDIOVASCULAR: No chest pain, palpitations, dizziness, or leg swelling  GASTROINTESTINAL: No abdominal pain. No nausea, vomiting, or hematemesis; No diarrhea or constipation. No melena or hematochezia.  GENITOURINARY: No dysuria or hematuria, urinary frequency  NEUROLOGICAL: No headaches, memory loss, loss of strength, numbness, or tremors  SKIN: No itching, burning, rashes, or lesions     MEDICATIONS  (STANDING):  apixaban 5 milliGRAM(s) Oral every 12 hours  folic acid 1 milliGRAM(s) Oral daily  metoprolol tartrate 25 milliGRAM(s) Oral two times a day  pantoprazole   Suspension 40 milliGRAM(s) Oral before breakfast  potassium chloride   Powder 40 milliEquivalent(s) Oral every 6 hours  senna 2 Tablet(s) Oral at bedtime  sertraline 25 milliGRAM(s) Oral daily    MEDICATIONS  (PRN):      Vital Signs Last 24 Hrs  T(C): 36.7 (14 Dec 2020 09:58), Max: 36.7 (13 Dec 2020 16:22)  T(F): 98 (14 Dec 2020 09:58), Max: 98.1 (13 Dec 2020 16:22)  HR: 75 (14 Dec 2020 09:58) (61 - 99)  BP: 129/67 (14 Dec 2020 09:58) (128/62 - 136/69)  BP(mean): --  RR: 18 (14 Dec 2020 09:58) (17 - 19)  SpO2: 98% (14 Dec 2020 09:58) (96% - 99%)    PHYSICAL EXAMINATION:  GENERAL: NAD, well built  HEAD:  Atraumatic, Normocephalic  EYES:  conjunctiva and sclera clear  NECK: Supple, No JVD, Normal thyroid  CHEST/LUNG: Clear to auscultation. Clear to percussion bilaterally; No rales, rhonchi, wheezing, or rubs  HEART: Regular rate and rhythm; No murmurs, rubs, or gallops  ABDOMEN: Soft, Nontender, Nondistended; Bowel sounds present  NERVOUS SYSTEM:  Alert & Oriented X3,    EXTREMITIES:  2+ Peripheral Pulses, No clubbing, cyanosis, or edema  SKIN: warm dry                          13.2   3.78  )-----------( 165      ( 14 Dec 2020 06:58 )             42.6     12-14    144  |  108  |  24<H>  ----------------------------<  91  3.3<L>   |  29  |  0.61    Ca    9.4      14 Dec 2020 06:58  Phos  2.7     12-14  Mg     2.0     12-14    TPro  6.4  /  Alb  3.2<L>  /  TBili  0.6  /  DBili  x   /  AST  26  /  ALT  38  /  AlkPhos  68  12-14    LIVER FUNCTIONS - ( 14 Dec 2020 06:58 )  Alb: 3.2 g/dL / Pro: 6.4 g/dL / ALK PHOS: 68 U/L / ALT: 38 U/L DA / AST: 26 U/L / GGT: x                   CAPILLARY BLOOD GLUCOSE      RADIOLOGY & ADDITIONAL TESTS:                   PGY-1 Progress Note discussed with attending    PAGER #: [[phone]] TILL 5:00 PM  PLEASE CONTACT ON CALL TEAM:  - On Call Team (Please refer to Lucy) FROM 5:00 PM - 8:30PM  - Nightfloat Team FROM 8:30 -7:30 AM    CHIEF COMPLAINT & BRIEF HOSPITAL COURSE:  75 year old female with medical history of iliac vein thrombosis, CVA  in past was sent to ED because patient ws not eating for 2 days . Patient is AAO x 0.  Spoke to patients Grand son he told that patient has base line dementia , she had stroke in March , in July she had iliac vein thrombosis, went to [facility] .she was there for 3 months and had fracture of right arm . she was send to [facility] and from hospital she was discharged at home with HHA 5hr/day for 5 days as family doesn't want to send her to NH or [facility]. Her HHA was increased to 7 hr/ 7days in wk. Maryan is the daughter who takes care of mom mostly Pt is bedbound , moved to chair sometimes by family.           INTERVAL HPI/OVERNIGHT EVENTS:   Patient was examined while  was lying in bed, Aox3, responding appropriately to questions, in NAD. _ has normal bowel and bladder movements, and denies any other acute complaints.   Medically stable for discharge     REVIEW OF SYSTEMS:  CONSTITUTIONAL: No fever, weight loss, or fatigue  RESPIRATORY: No cough, wheezing, chills or hemoptysis; No shortness of breath  CARDIOVASCULAR: No chest pain, palpitations, dizziness, or leg swelling  GASTROINTESTINAL: No abdominal pain. No nausea, vomiting, or hematemesis; No diarrhea or constipation. No melena or hematochezia.  GENITOURINARY: No dysuria or hematuria, urinary frequency  NEUROLOGICAL: No headaches, memory loss, loss of strength, numbness, or tremors  SKIN: No itching, burning, rashes, or lesions     MEDICATIONS  (STANDING):  apixaban 5 milliGRAM(s) Oral every 12 hours  folic acid 1 milliGRAM(s) Oral daily  metoprolol tartrate 25 milliGRAM(s) Oral two times a day  pantoprazole   Suspension 40 milliGRAM(s) Oral before breakfast  potassium chloride   Powder 40 milliEquivalent(s) Oral every 6 hours  senna 2 Tablet(s) Oral at bedtime  sertraline 25 milliGRAM(s) Oral daily    MEDICATIONS  (PRN):      Vital Signs Last 24 Hrs  T(C): 36.7 (14 Dec 2020 09:58), Max: 36.7 (13 Dec 2020 16:22)  T(F): 98 (14 Dec 2020 09:58), Max: 98.1 (13 Dec 2020 16:22)  HR: 75 (14 Dec 2020 09:58) (61 - 99)  BP: 129/67 (14 Dec 2020 09:58) (128/62 - 136/69)  BP(mean): --  RR: 18 (14 Dec 2020 09:58) (17 - 19)  SpO2: 98% (14 Dec 2020 09:58) (96% - 99%)    PHYSICAL EXAMINATION:  GENERAL: NAD, well built  HEAD:  Atraumatic, Normocephalic  EYES:  conjunctiva and sclera clear  NECK: Supple, No JVD, Normal thyroid  CHEST/LUNG: Clear to auscultation. Clear to percussion bilaterally; No rales, rhonchi, wheezing, or rubs  HEART: Regular rate and rhythm; No murmurs, rubs, or gallops  ABDOMEN: Soft, Nontender, Nondistended; Bowel sounds present  NERVOUS SYSTEM:  Alert & Oriented X3,    EXTREMITIES:  2+ Peripheral Pulses, No clubbing, cyanosis, or edema  SKIN: warm dry                          13.2   3.78  )-----------( 165      ( 14 Dec 2020 06:58 )             42.6     12-14    144  |  108  |  24<H>  ----------------------------<  91  3.3<L>   |  29  |  0.61    Ca    9.4      14 Dec 2020 06:58  Phos  2.7     12-14  Mg     2.0     12-14    TPro  6.4  /  Alb  3.2<L>  /  TBili  0.6  /  DBili  x   /  AST  26  /  ALT  38  /  AlkPhos  68  12-14    LIVER FUNCTIONS - ( 14 Dec 2020 06:58 )  Alb: 3.2 g/dL / Pro: 6.4 g/dL / ALK PHOS: 68 U/L / ALT: 38 U/L DA / AST: 26 U/L / GGT: x                   CAPILLARY BLOOD GLUCOSE      RADIOLOGY & ADDITIONAL TESTS:                   PGY-1 Progress Note discussed with attending    PAGER #: [[phone]] TILL 5:00 PM  PLEASE CONTACT ON CALL TEAM:  - On Call Team (Please refer to Lucy) FROM 5:00 PM - 8:30PM  - Nightfloat Team FROM 8:30 -7:30 AM    CHIEF COMPLAINT & BRIEF HOSPITAL COURSE:  76 y/o F with medical history of iliac vein thrombosis on Eliquis, dementia and CVA (right sided residual weakness) in past, from home had HHA 7hr/day 7 days in a week, was sent to ED because patient was not eating for 2 days . Patient is AAO x 1. Pt had leukopenia on admission 2.75 normalized later in the course. Pt with normal folate and vitamin B12. Assessed by Speech and Swallow who recommended diet pureed, thin liquid. Pt has loss of appetite, and was assessed by dietician. She only took a few spoons of food during assessment. Patient was noted to have a right humerus fracture, and ortho was consulted which recommended conservative management. She was investigated and found to have FTT likely due to progression of dementia and GOC conversation resulted in full code status. She was continued on eliquis for blood thinner for her iliac vein thrombosis.     INTERVAL HPI/OVERNIGHT EVENTS:   Patient was examined while she was lying in bed, Aox2-3, responding appropriately to questions in Green[place]ic, in NAD. She has normal bowel and bladder movements, and denies any other acute complaints apart from R shoulder pain, and headache. Medically stable for discharge.    REVIEW OF SYSTEMS:  CONSTITUTIONAL: No fever, weight loss, or fatigue, Headache +   RESPIRATORY: No cough, wheezing, chills or hemoptysis; No shortness of breath  CARDIOVASCULAR: No chest pain, palpitations, dizziness, or leg swelling  GASTROINTESTINAL: No abdominal pain. No nausea, vomiting, or hematemesis; No diarrhea or constipation. No melena or hematochezia.  GENITOURINARY: No dysuria or hematuria, urinary frequency  NEUROLOGICAL: No headaches, memory loss, loss of strength, numbness, or tremors  SKIN: No itching, burning, rashes, or lesions R shoulder in sling +    MEDICATIONS  (STANDING):  apixaban 5 milliGRAM(s) Oral every 12 hours  folic acid 1 milliGRAM(s) Oral daily  metoprolol tartrate 25 milliGRAM(s) Oral two times a day  pantoprazole   Suspension 40 milliGRAM(s) Oral before breakfast  potassium chloride   Powder 40 milliEquivalent(s) Oral every 6 hours  senna 2 Tablet(s) Oral at bedtime  sertraline 25 milliGRAM(s) Oral daily    MEDICATIONS  (PRN):      Vital Signs Last 24 Hrs  T(C): 36.7 (14 Dec 2020 09:58), Max: 36.7 (13 Dec 2020 16:22)  T(F): 98 (14 Dec 2020 09:58), Max: 98.1 (13 Dec 2020 16:22)  HR: 75 (14 Dec 2020 09:58) (61 - 99)  BP: 129/67 (14 Dec 2020 09:58) (128/62 - 136/69)  BP(mean): --  RR: 18 (14 Dec 2020 09:58) (17 - 19)  SpO2: 98% (14 Dec 2020 09:58) (96% - 99%)    PHYSICAL EXAMINATION:  GENERAL: NAD, well built  HEAD:  Atraumatic, Normocephalic  EYES:  conjunctiva and sclera clear  NECK: Supple, No JVD, Normal thyroid  CHEST/LUNG: Clear to auscultation. Clear to percussion bilaterally; No rales, rhonchi, wheezing, or rubs  HEART: Regular rate and rhythm; No murmurs, rubs, or gallops  ABDOMEN: Soft, Nontender, Nondistended; Bowel sounds present  NERVOUS SYSTEM:  Alert & Oriented X3,    EXTREMITIES:  2+ Peripheral Pulses, No clubbing, cyanosis, or edema, R arm in sling +   SKIN: warm dry                          13.2   3.78  )-----------( 165      ( 14 Dec 2020 06:58 )             42.6     12-14    144  |  108  |  24<H>  ----------------------------<  91  3.3<L>   |  29  |  0.61    Ca    9.4      14 Dec 2020 06:58  Phos  2.7     12-14  Mg     2.0     12-14    TPro  6.4  /  Alb  3.2<L>  /  TBili  0.6  /  DBili  x   /  AST  26  /  ALT  38  /  AlkPhos  68  12-14    LIVER FUNCTIONS - ( 14 Dec 2020 06:58 )  Alb: 3.2 g/dL / Pro: 6.4 g/dL / ALK PHOS: 68 U/L / ALT: 38 U/L DA / AST: 26 U/L / GGT: x                   CAPILLARY BLOOD GLUCOSE      RADIOLOGY & ADDITIONAL TESTS:

## 2020-12-14 NOTE — PROGRESS NOTE ADULT - PROBLEM SELECTOR PLAN 5
- pt with right side residual weakness  - on statin and Eliquis (for thrombosis) RISK                                                          Points  [] Previous VTE                                           3  [] Thrombophilia                                        2  [] Lower limb paralysis                              2   [] Current Cancer                                       2   [x] Immobilization > 24 hrs                        1  [] ICU/CCU stay > 24 hours                       1  [x] Age > 60                                                   1    Eliquis 5 mg bid

## 2020-12-14 NOTE — PROGRESS NOTE ADULT - PROBLEM SELECTOR PLAN 2
-Patient has hx of iliac vein thrombosis. CT scan on July 2020 showed .  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  -Patient on Eliquis 5 mg bid  will continue . - pt with right side residual weakness  - needs frequent turning to prevent skin breakdown   - on statin and Eliquis (for thrombosis)

## 2020-12-14 NOTE — PROGRESS NOTE ADULT - PROBLEM SELECTOR PLAN 3
-Patient takes sertraline 25 mg at home . will continue with home meds -Patient has hx of iliac vein thrombosis. CT scan on July 2020 showed .  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  -C/w Eliquis 5 mg bid

## 2020-12-14 NOTE — PROGRESS NOTE ADULT - PROBLEM SELECTOR PLAN 4
RISK                                                          Points  [] Previous VTE                                           3  [] Thrombophilia                                        2  [] Lower limb paralysis                              2   [] Current Cancer                                       2   [x] Immobilization > 24 hrs                        1  [] ICU/CCU stay > 24 hours                       1  [x] Age > 60                                                   1    Eliquis 5 mg bid - Patient takes sertraline 25 mg at home . will continue with home meds

## 2020-12-14 NOTE — PROGRESS NOTE ADULT - PROBLEM SELECTOR PLAN 1
-patient came in with not eating for two days.   -Labs showed leukopenia  and hypokalemia. Potassium replaced.  -nutrition consulted  - PT >home  -CT head neg  -Pt with loss of appetite likely in the setting of worsening dementia  -Pt with hx of old fracture in right arm, xray showed right humeral neck fracture,   - Speech and Swallow > pureed thin liquid  -Ortho consulted -patient came in with not eating for two days.   -Labs showed leukopenia  and hypokalemia; lytes were replaced   -nutrition consulted  - PT recommended home with no skilled needs   -CT head negative  -Pt with loss of appetite likely in the setting of worsening dementia  - Speech and Swallow rec'ed pureed thin liquid  -Pt with hx of old fracture in right arm, xray showed right humeral neck fracture,   -Ortho consulted and recommended conservative mng

## 2020-12-15 ENCOUNTER — TRANSCRIPTION ENCOUNTER (OUTPATIENT)
Age: 75
End: 2020-12-15

## 2020-12-15 VITALS
HEART RATE: 62 BPM | SYSTOLIC BLOOD PRESSURE: 128 MMHG | RESPIRATION RATE: 18 BRPM | DIASTOLIC BLOOD PRESSURE: 69 MMHG | OXYGEN SATURATION: 100 % | TEMPERATURE: 97 F

## 2020-12-15 LAB
ANION GAP SERPL CALC-SCNC: 5 MMOL/L — SIGNIFICANT CHANGE UP (ref 5–17)
BUN SERPL-MCNC: 23 MG/DL — HIGH (ref 7–18)
CALCIUM SERPL-MCNC: 9.9 MG/DL — SIGNIFICANT CHANGE UP (ref 8.4–10.5)
CHLORIDE SERPL-SCNC: 107 MMOL/L — SIGNIFICANT CHANGE UP (ref 96–108)
CO2 SERPL-SCNC: 30 MMOL/L — SIGNIFICANT CHANGE UP (ref 22–31)
CREAT SERPL-MCNC: 0.64 MG/DL — SIGNIFICANT CHANGE UP (ref 0.5–1.3)
GLUCOSE SERPL-MCNC: 99 MG/DL — SIGNIFICANT CHANGE UP (ref 70–99)
POTASSIUM SERPL-MCNC: 4.1 MMOL/L — SIGNIFICANT CHANGE UP (ref 3.5–5.3)
POTASSIUM SERPL-SCNC: 4.1 MMOL/L — SIGNIFICANT CHANGE UP (ref 3.5–5.3)
SODIUM SERPL-SCNC: 142 MMOL/L — SIGNIFICANT CHANGE UP (ref 135–145)

## 2020-12-15 PROCEDURE — 84484 ASSAY OF TROPONIN QUANT: CPT

## 2020-12-15 PROCEDURE — 80053 COMPREHEN METABOLIC PANEL: CPT

## 2020-12-15 PROCEDURE — 36415 COLL VENOUS BLD VENIPUNCTURE: CPT

## 2020-12-15 PROCEDURE — 99285 EMERGENCY DEPT VISIT HI MDM: CPT | Mod: 25

## 2020-12-15 PROCEDURE — 87635 SARS-COV-2 COVID-19 AMP PRB: CPT

## 2020-12-15 PROCEDURE — 85027 COMPLETE CBC AUTOMATED: CPT

## 2020-12-15 PROCEDURE — 99238 HOSP IP/OBS DSCHRG MGMT 30/<: CPT | Mod: GC

## 2020-12-15 PROCEDURE — 82248 BILIRUBIN DIRECT: CPT

## 2020-12-15 PROCEDURE — 86901 BLOOD TYPING SEROLOGIC RH(D): CPT

## 2020-12-15 PROCEDURE — 86140 C-REACTIVE PROTEIN: CPT

## 2020-12-15 PROCEDURE — 83880 ASSAY OF NATRIURETIC PEPTIDE: CPT

## 2020-12-15 PROCEDURE — 93005 ELECTROCARDIOGRAM TRACING: CPT

## 2020-12-15 PROCEDURE — 84443 ASSAY THYROID STIM HORMONE: CPT

## 2020-12-15 PROCEDURE — 70450 CT HEAD/BRAIN W/O DYE: CPT

## 2020-12-15 PROCEDURE — 86900 BLOOD TYPING SEROLOGIC ABO: CPT

## 2020-12-15 PROCEDURE — 81001 URINALYSIS AUTO W/SCOPE: CPT

## 2020-12-15 PROCEDURE — 85730 THROMBOPLASTIN TIME PARTIAL: CPT

## 2020-12-15 PROCEDURE — 92610 EVALUATE SWALLOWING FUNCTION: CPT

## 2020-12-15 PROCEDURE — 87086 URINE CULTURE/COLONY COUNT: CPT

## 2020-12-15 PROCEDURE — 85652 RBC SED RATE AUTOMATED: CPT

## 2020-12-15 PROCEDURE — 85025 COMPLETE CBC W/AUTO DIFF WBC: CPT

## 2020-12-15 PROCEDURE — 80048 BASIC METABOLIC PNL TOTAL CA: CPT

## 2020-12-15 PROCEDURE — 82247 BILIRUBIN TOTAL: CPT

## 2020-12-15 PROCEDURE — 82746 ASSAY OF FOLIC ACID SERUM: CPT

## 2020-12-15 PROCEDURE — 71045 X-RAY EXAM CHEST 1 VIEW: CPT

## 2020-12-15 PROCEDURE — 82607 VITAMIN B-12: CPT

## 2020-12-15 PROCEDURE — 84100 ASSAY OF PHOSPHORUS: CPT

## 2020-12-15 PROCEDURE — 83690 ASSAY OF LIPASE: CPT

## 2020-12-15 PROCEDURE — 86769 SARS-COV-2 COVID-19 ANTIBODY: CPT

## 2020-12-15 PROCEDURE — 82962 GLUCOSE BLOOD TEST: CPT

## 2020-12-15 PROCEDURE — 73030 X-RAY EXAM OF SHOULDER: CPT

## 2020-12-15 PROCEDURE — 73090 X-RAY EXAM OF FOREARM: CPT

## 2020-12-15 PROCEDURE — 83605 ASSAY OF LACTIC ACID: CPT

## 2020-12-15 PROCEDURE — 83735 ASSAY OF MAGNESIUM: CPT

## 2020-12-15 PROCEDURE — 86850 RBC ANTIBODY SCREEN: CPT

## 2020-12-15 PROCEDURE — 99053 MED SERV 10PM-8AM 24 HR FAC: CPT

## 2020-12-15 PROCEDURE — 85610 PROTHROMBIN TIME: CPT

## 2020-12-15 PROCEDURE — 80061 LIPID PANEL: CPT

## 2020-12-15 PROCEDURE — 84145 PROCALCITONIN (PCT): CPT

## 2020-12-15 PROCEDURE — 83036 HEMOGLOBIN GLYCOSYLATED A1C: CPT

## 2020-12-15 RX ADMIN — SERTRALINE 25 MILLIGRAM(S): 25 TABLET, FILM COATED ORAL at 11:49

## 2020-12-15 RX ADMIN — Medication 25 MILLIGRAM(S): at 06:28

## 2020-12-15 RX ADMIN — PANTOPRAZOLE SODIUM 40 MILLIGRAM(S): 20 TABLET, DELAYED RELEASE ORAL at 06:28

## 2020-12-15 RX ADMIN — Medication 1 MILLIGRAM(S): at 11:49

## 2020-12-15 RX ADMIN — APIXABAN 5 MILLIGRAM(S): 2.5 TABLET, FILM COATED ORAL at 06:28

## 2020-12-15 NOTE — DISCHARGE NOTE NURSING/CASE MANAGEMENT/SOCIAL WORK - PATIENT PORTAL LINK FT
You can access the FollowMyHealth Patient Portal offered by [facility] by registering at the following website: [email or web address]. By joining [employer]’s FollowMyHealth portal, you will also be able to view your health information using other applications (apps) compatible with our system.

## 2020-12-15 NOTE — PHARMACOTHERAPY INTERVENTION NOTE - NSPHARMCOMMPTEDU
Patient Education - Discharge Counseling [Good] : ~his/her~  mood as  good [] : Yes [No falls in past year] : Patient reported no falls in the past year [de-identified] : Social [de-identified] : Active [de-identified] : Diabetic for most part [Patient reported colonoscopy was normal] : Patient reported colonoscopy was normal [Change in mental status noted] : No change in mental status noted [None] : None [With Significant Other] : lives with significant other [Fully functional (bathing, dressing, toileting, transferring, walking, feeding)] : Fully functional (bathing, dressing, toileting, transferring, walking, feeding) [Fully functional (using the telephone, shopping, preparing meals, housekeeping, doing laundry, using] : Fully functional and needs no help or supervision to perform IADLs (using the telephone, shopping, preparing meals, housekeeping, doing laundry, using transportation, managing medications and managing finances) [MammogramComments] : sees GYN [ColonoscopyDate] : 2016

## 2020-12-15 NOTE — PROGRESS NOTE ADULT - REASON FOR ADMISSION
Failure to thrive

## 2020-12-15 NOTE — PROGRESS NOTE ADULT - NUTRITIONAL ASSESSMENT
This patient has been assessed with a concern for Malnutrition and has been determined to have a diagnosis/diagnoses of Severe protein-calorie malnutrition.    This patient is being managed with:   Diet Dysphagia 1 Pureed-Thin Liquids-  Supplement Feeding Modality:  Oral  Ensure Enlive Servings Per Day:  1       Frequency:  Three Times a day  Entered: Dec 11 2020 11:23AM    

## 2020-12-15 NOTE — PROGRESS NOTE ADULT - SUBJECTIVE AND OBJECTIVE BOX
Patient is a 75y old  Female who presents with a chief complaint of Failure to thrive (14 Dec 2020 15:23)      INTERVAL HPI/OVERNIGHT EVENTS:  no issues overnight per patient; no issues per nursing    T(C): 36.3 (12-15-20 @ 07:30), Max: 36.8 (12-14-20 @ 23:56)  HR: 62 (12-15-20 @ 07:30) (62 - 75)  BP: 128/69 (12-15-20 @ 07:30) (126/57 - 132/62)  RR: 18 (12-15-20 @ 07:30) (18 - 19)  SpO2: 100% (12-15-20 @ 07:30) (98% - 100%)      REVIEW OF SYSTEMS: denies fever, chills, SOB, palpitations, chest pain, abdominal pain, nausea, vomiting, diarrhea, constipation, dizziness    MEDICATIONS  (STANDING):  apixaban 5 milliGRAM(s) Oral every 12 hours  folic acid 1 milliGRAM(s) Oral daily  metoprolol tartrate 25 milliGRAM(s) Oral two times a day  pantoprazole   Suspension 40 milliGRAM(s) Oral before breakfast  senna 2 Tablet(s) Oral at bedtime  sertraline 25 milliGRAM(s) Oral daily      PHYSICAL EXAM:  GENERAL: NAD, well-groomed, well-developed  HEAD:  Atraumatic, Normocephalic  EYES:  conjunctiva and sclera clear  ENMT: Moist mucous membranes  NECK: Supple, No JVD  NERVOUS SYSTEM:  Alert & Oriented X3, Good concentration  CHEST/LUNG: Clear to auscultation bilaterally; No rales, rhonchi, wheezing, or rubs  HEART: Regular rate and rhythm; No murmurs, rubs, or gallops  ABDOMEN: Soft, Nontender, Nondistended; Bowel sounds present  EXTREMITIES:  2+ Peripheral Pulses, No clubbing, cyanosis, or edema; right arm in sling    LABS:                        13.2   3.78  )-----------( 165      ( 14 Dec 2020 06:58 )             42.6     12-15    142  |  107  |  23<H>  ----------------------------<  99  4.1   |  30  |  0.64    Ca    9.9      15 Dec 2020 10:52  Phos  2.7     12-14  Mg     2.0     12-14    TPro  6.4  /  Alb  3.2<L>  /  TBili  0.6  /  DBili  x   /  AST  26  /  ALT  38  /  AlkPhos  68  12-14

## 2020-12-15 NOTE — PROGRESS NOTE ADULT - ASSESSMENT
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 Up dated pts current clinical status, explained her poor overall prognosis and dementia trajectory. Initiated goals of care conversation, Full code  will follow up with her   75 year old female with a PMH of Dementia, h/o CVA in 3/2020 with Right-sided Residual Weakness, Depression, Left common and internal iliac vein thrombosis due to May Thurner syndrome, h/o COVID Infection 7/1/2020, Constipation, h/o Fall ~2 months ago who was BIBEMS due to failure to thrive      A/P  Failure to thrive  Leukopenia improving- no evidence of infection  Severe protein calorie malnutrition  Debility  Dementia  H/o Iliac vein thrombosis secondary to May Thurner syndrome  Recent CVA  March 20202 with residual weakness  Hypokalemia - resolved  Right humeral fracture on a sling    Plan  Calorie count in progress  Appreciate nutritional consult  Appreciate Orthopedic consult  Aspiration precaution  Fall precaution  FULL CODE    discharge home today with HHA and home care services

## 2022-02-13 NOTE — PROGRESS NOTE ADULT - ASSESSMENT
Problem: Pneumonia    Data: Patient alert and oriented. Patient complains denies pain. SPO2 remains greater then 89% on vapotherm 40 liters 60% at rest and 40 Liters with 80% with exertion. Lung sounds diminished. Productive cough noted, moderate amount of thick white sputum noted. IV Solumedrol and Antibiotics continues per MD orders. Incentive Spirometer and Flutter Valve use encouraged. Patient tolerating diet well, no nausea noted. Patient turned and repositioned every 2 hours for comfort per self. Patient up with stand by assist. Shortness of breath noted with exertion. Action: Keep patient comfortable and monitor every two hours. Education: Plan of care for the day. Response: Verbalized understanding.       Problem: Patient/Family Goals  Goal: Patient/Family Long Term Goal  Description: Patient's Long Term Goal:   \"I CAME IN FOR TREATMENT SO I CAN G HOME HEALTHY AGAIN\"    Interventions:  IV STEROID, IV ANTIBX  CARRY OUT PLAN OF CARE  - See additional Care Plan goals for specific interventions  Outcome: Progressing  Goal: Patient/Family Short Term Goal  Description: Patient's Short Term Goal:   02/12/2022 - KEEP SATS ABOVE 90%    Interventions:   - SUPPLEMENTAL O2 NEEDED  - See additional Care Plan goals for specific interventions  Outcome: Progressing     Problem: RESPIRATORY - ADULT  Goal: Achieves optimal ventilation and oxygenation  Description: INTERVENTIONS:  - Assess for changes in respiratory status  - Assess for changes in mentation and behavior  - Position to facilitate oxygenation and minimize respiratory effort  - Oxygen supplementation based on oxygen saturation or ABGs  - Provide Smoking Cessation handout, if applicable  - Encourage broncho-pulmonary hygiene including cough, deep breathe, Incentive Spirometry  - Assess the need for suctioning and perform as needed  - Assess and instruct to report SOB or any respiratory difficulty  - Respiratory Therapy support as indicated  - · Assessment		  74 year old lady presented with N/V and abdominal pain.. CT showed a partial thrombus at left common iliac vein.    Problem/Recommendation - 1:  Problem: Thrombosis of vein. Recommendation: May-Thurner syndrome  she had covid infection recently  the clot can be from the pressure caused by common iliac artery but the hypercoagulability caused by COVID probably play a role  she can be on NOAC  vascular surgeon does not think surgical intervention is necessary  but I think putting in a stent should be considered.  otherwise she needs to be on AC for the rest of her life.  there is no need to do thrombophilia w/u.    Problem/Recommendation - 2:  ·  Problem: Ovarian cyst.  Recommendation: needs to be investigated  transvaginal US showed a simple cyst   ca 125. is normal  follow up in 6 m Manage/alleviate anxiety  - Monitor for signs/symptoms of CO2 retention  Outcome: Progressing     Problem: RISK FOR INFECTION - ADULT  Goal: Absence of fever/infection during anticipated neutropenic period  Description: INTERVENTIONS  - Monitor WBC  - Administer growth factors as ordered  - Implement neutropenic guidelines  Outcome: Progressing     Problem: SAFETY ADULT - FALL  Goal: Free from fall injury  Description: INTERVENTIONS:  - Assess pt frequently for physical needs  - Identify cognitive and physical deficits and behaviors that affect risk of falls.   - [place] fall precautions as indicated by assessment.  - Educate pt/family on patient safety including physical limitations  - Instruct pt to call for assistance with activity based on assessment  - Modify environment to reduce risk of injury  - Provide assistive devices as appropriate  - Consider OT/PT consult to assist with strengthening/mobility  - Encourage toileting schedule  Outcome: Progressing     Problem: DISCHARGE PLANNING  Goal: Discharge to home or other facility with appropriate resources  Description: INTERVENTIONS:  - Identify barriers to discharge w/pt and caregiver  - Include patient/family/discharge partner in discharge planning  - Arrange for needed discharge resources and transportation as appropriate  - Identify discharge learning needs (meds, wound care, etc)  - Arrange for interpreters to assist at discharge as needed  - Consider post-discharge preferences of patient/family/discharge partner  - Complete POLST form as appropriate  - Assess patient's ability to be responsible for managing their own health  - Refer to Case Management Department for coordinating discharge planning if the patient needs post-hospital services based on physician/LIP order or complex needs related to functional status, cognitive ability or social support system  Outcome: Progressing

## 2022-06-30 NOTE — ED ADULT NURSE NOTE - NSFALLRSKUNASSIST_ED_ALL_ED
You can access the FollowMyHealth Patient Portal offered by [facility] by registering at the following website: [email or web address]. By joining [employer]’s FollowMyHealth portal, you will also be able to view your health information using other applications (apps) compatible with our system.
no

## 2022-07-20 ENCOUNTER — INPATIENT (INPATIENT)
Facility: HOSPITAL | Age: 77
LOS: 6 days | Discharge: HOME | DRG: 193 | End: 2022-07-27
Attending: HOSPITALIST | Admitting: HOSPITALIST
Payer: MEDICAID

## 2022-07-20 VITALS
SYSTOLIC BLOOD PRESSURE: 139 MMHG | RESPIRATION RATE: 18 BRPM | HEIGHT: 64 IN | WEIGHT: 175.05 LBS | DIASTOLIC BLOOD PRESSURE: 43 MMHG | HEART RATE: 79 BPM | TEMPERATURE: 99 F | OXYGEN SATURATION: 92 %

## 2022-07-20 DIAGNOSIS — Z90.710 ACQUIRED ABSENCE OF BOTH CERVIX AND UTERUS: Chronic | ICD-10-CM

## 2022-07-20 LAB
ALBUMIN SERPL ELPH-MCNC: 3 G/DL — LOW (ref 3.5–5)
ALP SERPL-CCNC: 106 U/L — SIGNIFICANT CHANGE UP (ref 40–120)
ALT FLD-CCNC: 92 U/L DA — HIGH (ref 10–60)
ANION GAP SERPL CALC-SCNC: 8 MMOL/L — SIGNIFICANT CHANGE UP (ref 5–17)
AST SERPL-CCNC: 64 U/L — HIGH (ref 10–40)
BASOPHILS # BLD AUTO: 0 K/UL — SIGNIFICANT CHANGE UP (ref 0–0.2)
BASOPHILS NFR BLD AUTO: 0 % — SIGNIFICANT CHANGE UP (ref 0–2)
BILIRUB SERPL-MCNC: 0.2 MG/DL — SIGNIFICANT CHANGE UP (ref 0.2–1.2)
BUN SERPL-MCNC: 14 MG/DL — SIGNIFICANT CHANGE UP (ref 7–18)
CALCIUM SERPL-MCNC: 9.1 MG/DL — SIGNIFICANT CHANGE UP (ref 8.4–10.5)
CHLORIDE SERPL-SCNC: 111 MMOL/L — HIGH (ref 96–108)
CO2 SERPL-SCNC: 22 MMOL/L — SIGNIFICANT CHANGE UP (ref 22–31)
CREAT SERPL-MCNC: 0.91 MG/DL — SIGNIFICANT CHANGE UP (ref 0.5–1.3)
EGFR: 66 ML/MIN/1.73M2 — SIGNIFICANT CHANGE UP
EOSINOPHIL # BLD AUTO: 0.08 K/UL — SIGNIFICANT CHANGE UP (ref 0–0.5)
EOSINOPHIL NFR BLD AUTO: 3.2 % — SIGNIFICANT CHANGE UP (ref 0–6)
GLUCOSE SERPL-MCNC: 187 MG/DL — HIGH (ref 70–99)
HCT VFR BLD CALC: 43.5 % — SIGNIFICANT CHANGE UP (ref 34.5–45)
HGB BLD-MCNC: 13.8 G/DL — SIGNIFICANT CHANGE UP (ref 11.5–15.5)
HIV 1 & 2 AB SERPL IA.RAPID: SIGNIFICANT CHANGE UP
HPIV3 RNA SPEC QL NAA+PROBE: DETECTED
IMM GRANULOCYTES NFR BLD AUTO: 0.4 % — SIGNIFICANT CHANGE UP (ref 0–1.5)
LACTATE SERPL-SCNC: 1.6 MMOL/L — SIGNIFICANT CHANGE UP (ref 0.7–2)
LYMPHOCYTES # BLD AUTO: 0.75 K/UL — LOW (ref 1–3.3)
LYMPHOCYTES # BLD AUTO: 29.6 % — SIGNIFICANT CHANGE UP (ref 13–44)
MCHC RBC-ENTMCNC: 31.6 PG — SIGNIFICANT CHANGE UP (ref 27–34)
MCHC RBC-ENTMCNC: 31.7 GM/DL — LOW (ref 32–36)
MCV RBC AUTO: 99.5 FL — SIGNIFICANT CHANGE UP (ref 80–100)
MONOCYTES # BLD AUTO: 0.26 K/UL — SIGNIFICANT CHANGE UP (ref 0–0.9)
MONOCYTES NFR BLD AUTO: 10.3 % — SIGNIFICANT CHANGE UP (ref 2–14)
NEUTROPHILS # BLD AUTO: 1.43 K/UL — LOW (ref 1.8–7.4)
NEUTROPHILS NFR BLD AUTO: 56.5 % — SIGNIFICANT CHANGE UP (ref 43–77)
NRBC # BLD: 0 /100 WBCS — SIGNIFICANT CHANGE UP (ref 0–0)
NT-PROBNP SERPL-SCNC: 126 PG/ML — SIGNIFICANT CHANGE UP (ref 0–450)
PLATELET # BLD AUTO: 146 K/UL — LOW (ref 150–400)
POTASSIUM SERPL-MCNC: 4.4 MMOL/L — SIGNIFICANT CHANGE UP (ref 3.5–5.3)
POTASSIUM SERPL-SCNC: 4.4 MMOL/L — SIGNIFICANT CHANGE UP (ref 3.5–5.3)
PROT SERPL-MCNC: 6.6 G/DL — SIGNIFICANT CHANGE UP (ref 6–8.3)
RAPID RVP RESULT: DETECTED
RBC # BLD: 4.37 M/UL — SIGNIFICANT CHANGE UP (ref 3.8–5.2)
RBC # FLD: 12.8 % — SIGNIFICANT CHANGE UP (ref 10.3–14.5)
SARS-COV-2 RNA SPEC QL NAA+PROBE: SIGNIFICANT CHANGE UP
SODIUM SERPL-SCNC: 141 MMOL/L — SIGNIFICANT CHANGE UP (ref 135–145)
TROPONIN I, HIGH SENSITIVITY RESULT: 81.5 NG/L — HIGH
WBC # BLD: 2.53 K/UL — LOW (ref 3.8–10.5)
WBC # FLD AUTO: 2.53 K/UL — LOW (ref 3.8–10.5)

## 2022-07-20 PROCEDURE — 99285 EMERGENCY DEPT VISIT HI MDM: CPT

## 2022-07-20 PROCEDURE — 71045 X-RAY EXAM CHEST 1 VIEW: CPT | Mod: 26

## 2022-07-20 RX ORDER — ACETAMINOPHEN 500 MG
650 TABLET ORAL ONCE
Refills: 0 | Status: COMPLETED | OUTPATIENT
Start: 2022-07-20 | End: 2022-07-20

## 2022-07-20 RX ORDER — AZITHROMYCIN 500 MG/1
500 TABLET, FILM COATED ORAL ONCE
Refills: 0 | Status: COMPLETED | OUTPATIENT
Start: 2022-07-20 | End: 2022-07-20

## 2022-07-20 RX ORDER — CEFTRIAXONE 500 MG/1
1000 INJECTION, POWDER, FOR SOLUTION INTRAMUSCULAR; INTRAVENOUS ONCE
Refills: 0 | Status: COMPLETED | OUTPATIENT
Start: 2022-07-20 | End: 2022-07-20

## 2022-07-20 RX ADMIN — AZITHROMYCIN 500 MILLIGRAM(S): 500 TABLET, FILM COATED ORAL at 22:00

## 2022-07-20 RX ADMIN — CEFTRIAXONE 100 MILLIGRAM(S): 500 INJECTION, POWDER, FOR SOLUTION INTRAMUSCULAR; INTRAVENOUS at 20:35

## 2022-07-20 RX ADMIN — CEFTRIAXONE 1000 MILLIGRAM(S): 500 INJECTION, POWDER, FOR SOLUTION INTRAMUSCULAR; INTRAVENOUS at 21:05

## 2022-07-20 RX ADMIN — AZITHROMYCIN 255 MILLIGRAM(S): 500 TABLET, FILM COATED ORAL at 20:35

## 2022-07-20 NOTE — ED PROVIDER NOTE - OBJECTIVE STATEMENT
76 y.o. female as per daughter, pt with fever for past 3-4 days, mild coughing, coughing up whitish/yellowish sputum, weakness, nasal congestion- whitish sputum, @ times with sob, myalgia, pt with good appetite, good appetite, no n/v, pt's never vaccinated for COVID.  Pt's son is sick with the same symptoms.  Pt took Tylenol, last dose last night. 76 y.o. female as per daughter, pt with fever for past 3-4 days, mild coughing, coughing up whitish/yellowish sputum, weakness, nasal congestion- whitish sputum, @ times with sob, myalgia, CP with coughing, pt with good appetite, no n/v, pt's never vaccinated for COVID.  Pt's son is sick with the same symptoms.  Pt took Tylenol, last dose last night.

## 2022-07-20 NOTE — ED PROVIDER NOTE - NSICDXPASTMEDICALHX_GEN_ALL_CORE_FT
PAST MEDICAL HISTORY:  Cerebrovascular accident (CVA)     H/O iliac vein thrombosis     HTN (hypertension)     Hypercholesterolemia

## 2022-07-20 NOTE — ED PROVIDER NOTE - CARE PLAN
Principal Discharge DX:	Elevated troponin  Secondary Diagnosis:	Acute viral syndrome  Secondary Diagnosis:	Hyperglycemia   1

## 2022-07-20 NOTE — ED ADULT NURSE NOTE - ED STAT RN HANDOFF DETAILS 2
Patient downgraded to medicine telemetry removed in no acute distress on contact isolation. Endorsed to hold RN Laura , patient transported via stretcher stable in no acute distress safety maintained.

## 2022-07-21 DIAGNOSIS — Z29.9 ENCOUNTER FOR PROPHYLACTIC MEASURES, UNSPECIFIED: ICD-10-CM

## 2022-07-21 DIAGNOSIS — I24.8 OTHER FORMS OF ACUTE ISCHEMIC HEART DISEASE: ICD-10-CM

## 2022-07-21 DIAGNOSIS — B34.8 OTHER VIRAL INFECTIONS OF UNSPECIFIED SITE: ICD-10-CM

## 2022-07-21 DIAGNOSIS — R77.8 OTHER SPECIFIED ABNORMALITIES OF PLASMA PROTEINS: ICD-10-CM

## 2022-07-21 DIAGNOSIS — E78.5 HYPERLIPIDEMIA, UNSPECIFIED: ICD-10-CM

## 2022-07-21 DIAGNOSIS — I63.9 CEREBRAL INFARCTION, UNSPECIFIED: ICD-10-CM

## 2022-07-21 DIAGNOSIS — I10 ESSENTIAL (PRIMARY) HYPERTENSION: ICD-10-CM

## 2022-07-21 DIAGNOSIS — R65.10 SYSTEMIC INFLAMMATORY RESPONSE SYNDROME (SIRS) OF NON-INFECTIOUS ORIGIN WITHOUT ACUTE ORGAN DYSFUNCTION: ICD-10-CM

## 2022-07-21 PROBLEM — Z86.718 PERSONAL HISTORY OF OTHER VENOUS THROMBOSIS AND EMBOLISM: Chronic | Status: ACTIVE | Noted: 2020-12-10

## 2022-07-21 LAB
ALBUMIN SERPL ELPH-MCNC: 3 G/DL — LOW (ref 3.5–5)
ALP SERPL-CCNC: 92 U/L — SIGNIFICANT CHANGE UP (ref 40–120)
ALT FLD-CCNC: 84 U/L DA — HIGH (ref 10–60)
ANION GAP SERPL CALC-SCNC: 12 MMOL/L — SIGNIFICANT CHANGE UP (ref 5–17)
APPEARANCE UR: CLEAR — SIGNIFICANT CHANGE UP
AST SERPL-CCNC: 49 U/L — HIGH (ref 10–40)
BACTERIA # UR AUTO: ABNORMAL /HPF
BASOPHILS # BLD AUTO: 0.01 K/UL — SIGNIFICANT CHANGE UP (ref 0–0.2)
BASOPHILS NFR BLD AUTO: 0.3 % — SIGNIFICANT CHANGE UP (ref 0–2)
BILIRUB SERPL-MCNC: 0.4 MG/DL — SIGNIFICANT CHANGE UP (ref 0.2–1.2)
BILIRUB UR-MCNC: NEGATIVE — SIGNIFICANT CHANGE UP
BUN SERPL-MCNC: 11 MG/DL — SIGNIFICANT CHANGE UP (ref 7–18)
CALCIUM SERPL-MCNC: 9.2 MG/DL — SIGNIFICANT CHANGE UP (ref 8.4–10.5)
CHLORIDE SERPL-SCNC: 107 MMOL/L — SIGNIFICANT CHANGE UP (ref 96–108)
CHOLEST SERPL-MCNC: 149 MG/DL — SIGNIFICANT CHANGE UP
CO2 SERPL-SCNC: 22 MMOL/L — SIGNIFICANT CHANGE UP (ref 22–31)
COD CRY URNS QL: ABNORMAL /HPF
COLOR SPEC: YELLOW — SIGNIFICANT CHANGE UP
CREAT SERPL-MCNC: 0.82 MG/DL — SIGNIFICANT CHANGE UP (ref 0.5–1.3)
DIFF PNL FLD: ABNORMAL
EGFR: 75 ML/MIN/1.73M2 — SIGNIFICANT CHANGE UP
EOSINOPHIL # BLD AUTO: 0.1 K/UL — SIGNIFICANT CHANGE UP (ref 0–0.5)
EOSINOPHIL NFR BLD AUTO: 3 % — SIGNIFICANT CHANGE UP (ref 0–6)
EPI CELLS # UR: SIGNIFICANT CHANGE UP /HPF
GLUCOSE SERPL-MCNC: 122 MG/DL — HIGH (ref 70–99)
GLUCOSE UR QL: NEGATIVE — SIGNIFICANT CHANGE UP
HCT VFR BLD CALC: 42 % — SIGNIFICANT CHANGE UP (ref 34.5–45)
HDLC SERPL-MCNC: 63 MG/DL — SIGNIFICANT CHANGE UP
HGB BLD-MCNC: 13.3 G/DL — SIGNIFICANT CHANGE UP (ref 11.5–15.5)
IMM GRANULOCYTES NFR BLD AUTO: 0.3 % — SIGNIFICANT CHANGE UP (ref 0–1.5)
KETONES UR-MCNC: NEGATIVE — SIGNIFICANT CHANGE UP
LEUKOCYTE ESTERASE UR-ACNC: ABNORMAL
LIPID PNL WITH DIRECT LDL SERPL: 67 MG/DL — SIGNIFICANT CHANGE UP
LYMPHOCYTES # BLD AUTO: 1.22 K/UL — SIGNIFICANT CHANGE UP (ref 1–3.3)
LYMPHOCYTES # BLD AUTO: 36.3 % — SIGNIFICANT CHANGE UP (ref 13–44)
MAGNESIUM SERPL-MCNC: 2 MG/DL — SIGNIFICANT CHANGE UP (ref 1.6–2.6)
MCHC RBC-ENTMCNC: 31.3 PG — SIGNIFICANT CHANGE UP (ref 27–34)
MCHC RBC-ENTMCNC: 31.7 GM/DL — LOW (ref 32–36)
MCV RBC AUTO: 98.8 FL — SIGNIFICANT CHANGE UP (ref 80–100)
MONOCYTES # BLD AUTO: 0.36 K/UL — SIGNIFICANT CHANGE UP (ref 0–0.9)
MONOCYTES NFR BLD AUTO: 10.7 % — SIGNIFICANT CHANGE UP (ref 2–14)
NEUTROPHILS # BLD AUTO: 1.66 K/UL — LOW (ref 1.8–7.4)
NEUTROPHILS NFR BLD AUTO: 49.4 % — SIGNIFICANT CHANGE UP (ref 43–77)
NITRITE UR-MCNC: NEGATIVE — SIGNIFICANT CHANGE UP
NON HDL CHOLESTEROL: 86 MG/DL — SIGNIFICANT CHANGE UP
NRBC # BLD: 0 /100 WBCS — SIGNIFICANT CHANGE UP (ref 0–0)
PH UR: 7 — SIGNIFICANT CHANGE UP (ref 5–8)
PHOSPHATE SERPL-MCNC: 2.8 MG/DL — SIGNIFICANT CHANGE UP (ref 2.5–4.5)
PLATELET # BLD AUTO: 136 K/UL — LOW (ref 150–400)
POTASSIUM SERPL-MCNC: 3.9 MMOL/L — SIGNIFICANT CHANGE UP (ref 3.5–5.3)
POTASSIUM SERPL-SCNC: 3.9 MMOL/L — SIGNIFICANT CHANGE UP (ref 3.5–5.3)
PROT SERPL-MCNC: 6.7 G/DL — SIGNIFICANT CHANGE UP (ref 6–8.3)
PROT UR-MCNC: NEGATIVE — SIGNIFICANT CHANGE UP
RBC # BLD: 4.25 M/UL — SIGNIFICANT CHANGE UP (ref 3.8–5.2)
RBC # FLD: 12.7 % — SIGNIFICANT CHANGE UP (ref 10.3–14.5)
RBC CASTS # UR COMP ASSIST: ABNORMAL /HPF (ref 0–2)
SODIUM SERPL-SCNC: 141 MMOL/L — SIGNIFICANT CHANGE UP (ref 135–145)
SP GR SPEC: 1 — LOW (ref 1.01–1.02)
TRIGL SERPL-MCNC: 95 MG/DL — SIGNIFICANT CHANGE UP
TROPONIN I, HIGH SENSITIVITY RESULT: 74 NG/L — HIGH
UROBILINOGEN FLD QL: NEGATIVE — SIGNIFICANT CHANGE UP
WBC # BLD: 3.36 K/UL — LOW (ref 3.8–10.5)
WBC # FLD AUTO: 3.36 K/UL — LOW (ref 3.8–10.5)
WBC UR QL: SIGNIFICANT CHANGE UP /HPF (ref 0–5)

## 2022-07-21 PROCEDURE — 99223 1ST HOSP IP/OBS HIGH 75: CPT | Mod: GC

## 2022-07-21 RX ORDER — ATORVASTATIN CALCIUM 80 MG/1
20 TABLET, FILM COATED ORAL AT BEDTIME
Refills: 0 | Status: DISCONTINUED | OUTPATIENT
Start: 2022-07-21 | End: 2022-07-27

## 2022-07-21 RX ORDER — ACETAMINOPHEN 500 MG
650 TABLET ORAL EVERY 6 HOURS
Refills: 0 | Status: DISCONTINUED | OUTPATIENT
Start: 2022-07-21 | End: 2022-07-27

## 2022-07-21 RX ORDER — PANTOPRAZOLE SODIUM 20 MG/1
40 TABLET, DELAYED RELEASE ORAL
Refills: 0 | Status: DISCONTINUED | OUTPATIENT
Start: 2022-07-21 | End: 2022-07-27

## 2022-07-21 RX ORDER — ALBUTEROL 90 UG/1
2 AEROSOL, METERED ORAL EVERY 6 HOURS
Refills: 0 | Status: DISCONTINUED | OUTPATIENT
Start: 2022-07-21 | End: 2022-07-25

## 2022-07-21 RX ORDER — METOPROLOL TARTRATE 50 MG
12.5 TABLET ORAL
Refills: 0 | Status: DISCONTINUED | OUTPATIENT
Start: 2022-07-21 | End: 2022-07-27

## 2022-07-21 RX ORDER — APIXABAN 2.5 MG/1
5 TABLET, FILM COATED ORAL EVERY 12 HOURS
Refills: 0 | Status: DISCONTINUED | OUTPATIENT
Start: 2022-07-21 | End: 2022-07-27

## 2022-07-21 RX ORDER — CEFTRIAXONE 500 MG/1
1000 INJECTION, POWDER, FOR SOLUTION INTRAMUSCULAR; INTRAVENOUS EVERY 24 HOURS
Refills: 0 | Status: DISCONTINUED | OUTPATIENT
Start: 2022-07-21 | End: 2022-07-23

## 2022-07-21 RX ORDER — CEFTRIAXONE 500 MG/1
1000 INJECTION, POWDER, FOR SOLUTION INTRAMUSCULAR; INTRAVENOUS EVERY 24 HOURS
Refills: 0 | Status: DISCONTINUED | OUTPATIENT
Start: 2022-07-21 | End: 2022-07-21

## 2022-07-21 RX ORDER — SODIUM CHLORIDE 9 MG/ML
1000 INJECTION INTRAMUSCULAR; INTRAVENOUS; SUBCUTANEOUS
Refills: 0 | Status: DISCONTINUED | OUTPATIENT
Start: 2022-07-21 | End: 2022-07-27

## 2022-07-21 RX ORDER — AZITHROMYCIN 500 MG/1
500 TABLET, FILM COATED ORAL EVERY 24 HOURS
Refills: 0 | Status: COMPLETED | OUTPATIENT
Start: 2022-07-21 | End: 2022-07-22

## 2022-07-21 RX ADMIN — Medication 12.5 MILLIGRAM(S): at 19:55

## 2022-07-21 RX ADMIN — Medication 200 MILLIGRAM(S): at 23:09

## 2022-07-21 RX ADMIN — AZITHROMYCIN 255 MILLIGRAM(S): 500 TABLET, FILM COATED ORAL at 21:17

## 2022-07-21 RX ADMIN — Medication 200 MILLIGRAM(S): at 19:56

## 2022-07-21 RX ADMIN — APIXABAN 5 MILLIGRAM(S): 2.5 TABLET, FILM COATED ORAL at 06:27

## 2022-07-21 RX ADMIN — Medication 200 MILLIGRAM(S): at 13:17

## 2022-07-21 RX ADMIN — PANTOPRAZOLE SODIUM 40 MILLIGRAM(S): 20 TABLET, DELAYED RELEASE ORAL at 06:25

## 2022-07-21 RX ADMIN — Medication 200 MILLIGRAM(S): at 04:33

## 2022-07-21 RX ADMIN — CEFTRIAXONE 100 MILLIGRAM(S): 500 INJECTION, POWDER, FOR SOLUTION INTRAMUSCULAR; INTRAVENOUS at 22:12

## 2022-07-21 RX ADMIN — Medication 650 MILLIGRAM(S): at 04:01

## 2022-07-21 RX ADMIN — SODIUM CHLORIDE 60 MILLILITER(S): 9 INJECTION INTRAMUSCULAR; INTRAVENOUS; SUBCUTANEOUS at 13:16

## 2022-07-21 RX ADMIN — ATORVASTATIN CALCIUM 20 MILLIGRAM(S): 80 TABLET, FILM COATED ORAL at 21:18

## 2022-07-21 RX ADMIN — Medication 12.5 MILLIGRAM(S): at 06:25

## 2022-07-21 RX ADMIN — APIXABAN 5 MILLIGRAM(S): 2.5 TABLET, FILM COATED ORAL at 19:55

## 2022-07-21 RX ADMIN — SODIUM CHLORIDE 60 MILLILITER(S): 9 INJECTION INTRAMUSCULAR; INTRAVENOUS; SUBCUTANEOUS at 04:33

## 2022-07-21 RX ADMIN — Medication 200 MILLIGRAM(S): at 06:26

## 2022-07-21 RX ADMIN — Medication 650 MILLIGRAM(S): at 00:16

## 2022-07-21 NOTE — ED ADULT NURSE REASSESSMENT NOTE - NS ED NURSE REASSESS COMMENT FT1
Received patient admitted to telemetry in no acute distress awake responsive to tactile stimuli no apparent s/s of pain awaiting bed ,patient on contact isolation continue to monitor patient.

## 2022-07-21 NOTE — H&P ADULT - PROBLEM SELECTOR PLAN 2
Pt with Cough, sputum production, nasal and chest congestion, fevers, headaches  + parainfluenza  Tylenol PRN  Robitusin  Albuterol PRN  IV fluids  Contact isolation

## 2022-07-21 NOTE — PROGRESS NOTE ADULT - PROBLEM SELECTOR PLAN 1
WBC < 4, T 100.4  Lactate wnl  Chest xray with R hillar infiltrate? f/u official read  RVP + for parainfluenza  S/p Ceftriaxone and azithromycin in ED  continue gentle IV fluids  Tylenol PRn  f/u Blood cultures, F/u UA and urine cultures  willl hold abx for now

## 2022-07-21 NOTE — H&P ADULT - PROBLEM SELECTOR PLAN 3
Pt with mild chest pain (reproducible on palpation) likely 2/2 to cough, but elevated troponins  Trend troponin  Tele monitoring  Metoprolol 12.5 bid (need to confirm home medications)  Atorvastatin  Pt on eliquis due to Hx of thrombus Pt with mild chest pain (reproducible on palpation) likely 2/2 to cough, but elevated troponins  EKG NSR, No ST changes  Trend troponin  Tele monitoring  Metoprolol 12.5 bid (need to confirm home medications)  Atorvastatin  Pt on eliquis due to Hx of thrombus

## 2022-07-21 NOTE — H&P ADULT - PROBLEM SELECTOR PLAN 4
Pmh of htn  need to confirm home meds  continue metoprolol for now  monitor bp  adjust meds as needed

## 2022-07-21 NOTE — PATIENT PROFILE ADULT - NSPROPTRIGHTSUPPORTNAME_GEN_A_NUR
[facility]                                  Skilled Nursing Facility                   Progress Note   DOS 8/15/2019  Admit Date: 8/12/2019  SYEDA   Principal Problem:  Atrial fibrillation status post cardioversion   HPI obtained from patient interview and chart review     Chief Complaint: F/u AFib, F/u hypotension, f/u wound eval, and Revaluation of medical treatment and therapy status: Lab review    HPI: Ms Lan is an 85 y.o. female with sig pmhx of chronic AF on Eliquis s/p DCCV 12/2018, HFpEF, HTN, AS s/p AVR 2004, severe MR, CKD4, HTN, Arthritis,HLD and hx R breast cancer who presents to SNF s/p hospitalization for Atrial fibrillation with RVR with LAUREEN/successful DCCV 8/9/19 by Dr. Randall. The patient initially presented to arrhythmia clinic for AF w/ RVR HR 140s, generalized weakness, shortness of breath, and multiple falls. The patient denies having chest pain or palpitations. Initiating order to monitor patient with camera for fall prevention.     Interval Hx: All of today's labs reviewed. No leukocytosis. Hemoglobin stable at 10.6.  Platelets stable at 154. Sodium at 139. Potasium 3.6, initiated potassium 40 mEq x1 and initiated potassium 20 mEq daily to start on 08/16. Creatinine stable at 1.1. 24 hr blood glucose 108. Patient progessing well with PT/OT. Patient medically stable. Continuing to follow and treat all acute and chronic conditions. 8/13 assessment reveals irregular heartbeat, per auscultation. 8/15 review of 12 lead EKG revealed normal sinus rhythm with artifact, will consider reordering if patient becomes symptomatic. 08/13, nurse reporting hypotension with manual BP at 90/50, decreased lasix to 40 mg daily and decreased lopressor to 50 mg bid. 8/15 Patient's current blood pressure is at 109/55 and stable. 8/13 Daughter reporting the patient has loose dental and lack of appetite. 8/15 Awaiting nutritionist recs. 8/13 Patient with multiple bruises to UE and  wound to left UE.  Initiating consult to wound care for evaluation of left upper arm extremity wound. 8/15 review of wound care consult revealed skin tear to LLE, see wounds below. 8/15 Initiated wound care order to clean with sterile normal saline and dressed with foam dressing weekly.     PEx  Constitutional: Patient appears well-developed and in no distress   HENT:  Head: Normocephalic and atraumatic.   Eyes: Pupils are equal, round, and reactive to light.   Neck: Normal range of motion. Neck supple.   Cardiovascular: Normal rate, regular rhythm and normal heart sounds.    Pulmonary/Chest: Effort normal and breath sounds are clear  Abdominal: Soft. Bowel sounds are normal.   Musculoskeletal: Normal range of motion. + generalized weakness.  Neurological: + Alert and oriented to person, place  Psychiatric: Normal mood and affect. Behavior is normal.   Wounds/Skin: Skin is warm and dry.    08/14/19 1456        Wound 08/09/19 0618 Skin Tear lower Arm   Date First Assessed/Time First Assessed: 08/09/19 0618   Pre-existing: No  Primary Wound Type: Skin Tear  Side: Left  Orientation: lower  Location: Arm       Wound WDL ex   Drainage Amount None   Drainage Characteristics/Odor No odor   Appearance Moist;Pink   Tissue loss description Partial thickness   Periwound Area Ecchymotic   Wound Edges Open   Wound Length (cm) 1.5 cm   Wound Width (cm) 0.5 cm   Wound Depth (cm) 0.1 cm   Wound Volume (cm^3) 0.08 cm^3   Wound Surface Area (cm^2) 0.75 cm^2   Dressing     Following: This NP weekly- last observed on 8/15/2019 and wound care team Prn.     Temp:  [97.6 °F (36.4 °C)-97.7 °F (36.5 °C)]   Pulse:  [64-66]   Resp:  [16-20]   BP: (109-110)/(55)   SpO2:  [92 %-94 %]   Body mass index is 26.01 kg/m².     ROS  Constitutional: Negative for fever and malaise/fatigue.   Eyes: Negative for blurred vision, double vision and discharge.   Respiratory: Negative for cough, shortness of breath and wheezing.    Cardiovascular: Negative for  chest pain, palpitations, claudication, and leg swelling.   Gastrointestinal: Negative for abdominal pain, constipation, diarrhea, nausea and vomiting.   Genitourinary: Negative for dysuria, frequency and urgency.   Musculoskeletal:  + generalized weakness. Negative for back pain and myalgias.   Skin: Negative for itching and rash.  Neurological: Negative for dizziness, speech change, seizures, and headaches.   Psychiatric/Behavioral: Negative for depression. The patient is not nervous/anxious.      Past Medical History: Patient has a past medical history of Arthritis, Atrial fibrillation (10/2018), Breast cancer (11/2012), Chronic diastolic heart failure, Heart murmur, Hyperlipidemia, Hypertension, Left atrial enlargement, and Severe mitral regurgitation.    Past Surgical History: Patient has a past surgical history that includes Tubal ligation; Breast biopsy (11/2012); Tissue aortic valve replacement (09/27/2004); Cataract extraction w/  intraocular lens implant (Right, 10/21/2008); Cataract extraction w/  intraocular lens implant (Left); Cardioversion (N/A, 12/18/2018); and Treatment of cardiac arrhythmia (N/A, 8/9/2019).    Social History: Patient reports that she quit smoking about 16 years ago. She has a 50.00 pack-year smoking history. She has never used smokeless tobacco. She reports that she drinks about 1.5 oz of alcohol per week. She reports that she does not use drugs.    Family History: family history includes Breast cancer in her maternal grandmother; Cancer in her maternal aunt; Heart disease in her father; No Known Problems in her brother, maternal grandfather, maternal uncle, mother, paternal aunt, paternal grandfather, paternal grandmother, paternal uncle, and sister.    Allergies: Patient is allergic to etodolac and nsaids (non-steroidal anti-inflammatory drug).      Assessment and Plan:  Hypokalemia  -8/15 initiated potassium 40 mEq x1 and initiated potassium 20 mEq daily to start on 08/16.       Atrial fibrillation with RVR s/p LAUREEN/successful DCCV 8/9/19   -Home medications: diltiazem 240 mg daily + Lopressor 25 mg BID  -continue started on PO amio x 14 d  -Resume Eliquis renal dose adjusted  -Dr Guerra f/u 1 month with repeat TTE prior  -8/13 initiated amiodarone 200 mg daily to start on 8/17  -8/13 Initiated 12 lead EKG for suspected afib  -8/15 8/15 review of 12 lead EKG revealed normal sinus rhythm with artifact, will consider reordering if patient becomes symptomatic.      Hypotension  -08/13 Decreased lasix to 40 mg daily and decreased lopressor to 50 mg bid.   -8/15 Patient's current blood pressure is at 109/55 and stable.     Nutritional deficiency  -8/13 Initiating nutrition consult.    -8/15 Awaiting nutritionist recs.     Wound of left upper extremity  -8/13 Initiating consult to wound care for evaluation of left upper arm extremity wound.  -8/15 Initiated wound care order to clean with sterile normal saline and dressed with foam dressing weekly.    ANTONIO on CKD4  -2/2 hypotension vs cardiorenal, improving w/ diuresis  -8/13 Decreased lasix to 40 mg daily.    Acute on chronic diastolic heart failure  Severe MR  AS s/p bioprosthetic AVR '04  -Home medication: Lasix 40 mg BID  -Echo: EF 55%, severe MR, moderate-severe TR, CVP 15, pHTN  -8/13 initiate order for Strict I/O's, daily weights, and decreased Lasix to 40 mg daily     Delirium resolved  UTI resolved   -Ceftriaxone > PO Cipro  -UCx: Klebsiella pneumoniae  -8/13 zyprexa dced      CONTINUE    HTN  -8/13 decreased lopressor to 50 mg bid.      Generalized weakness  Falls  -PTOT consult - SNF  -Fall precautions  -8/13 Initiating order to monitor patient with camera for fall prevention.      Elevated TSH  -TSH 7.1, normal T4  -Repeat TFTs 4-6 wks per pcp    Acute hypoxic respiratory failure, resolved    Outpatient f/u thyroid function, pulmonary status, liver function, as well as optho exam annually    Future Appointments   Date Time Provider  Department Center   9/5/2019  3:00 PM Florentino Valadez MD [facility] IM Rohan Serrano PCW   11/8/2019  1:00 PM Angelika Mcallister MD [facility] CARDIO Rohan Chun NP     Jono Flood (son) Valdez Willingham([phone] or Gregory Flood (son)

## 2022-07-21 NOTE — PATIENT PROFILE ADULT - FUNCTIONAL ASSESSMENT - BASIC MOBILITY 6.
1-calculated by average/Not able to assess (calculate score using [facility] averaging method)

## 2022-07-21 NOTE — H&P ADULT - ATTENDING COMMENTS
Vital Signs Last 24 Hrs  T(C): 37.7 (21 Jul 2022 00:45), Max: 38 (20 Jul 2022 20:50)  T(F): 99.9 (21 Jul 2022 00:45), Max: 100.4 (20 Jul 2022 20:50)  HR: 64 (21 Jul 2022 00:45) (64 - 79)  BP: 138/74 (21 Jul 2022 00:45) (138/74 - 139/43)  BP(mean): --  RR: 18 (21 Jul 2022 00:45) (18 - 18)  SpO2: 96% (21 Jul 2022 00:45) (92% - 96%)    Parameters below as of 21 Jul 2022 00:45  Patient On (Oxygen Delivery Method): room air    Labs and imaging studies noted    Assessment and plan: Patient seen and examined at bedside. ([place]  # [id number])    Vital Signs Last 24 Hrs  T(C): 37.7 (21 Jul 2022 00:45), Max: 38 (20 Jul 2022 20:50)  T(F): 99.9 (21 Jul 2022 00:45), Max: 100.4 (20 Jul 2022 20:50)  HR: 64 (21 Jul 2022 00:45) (64 - 79)  BP: 138/74 (21 Jul 2022 00:45) (138/74 - 139/43)  BP(mean): --  RR: 18 (21 Jul 2022 00:45) (18 - 18)  SpO2: 96% (21 Jul 2022 00:45) (92% - 96%)    Parameters below as of 21 Jul 2022 00:45  Patient On (Oxygen Delivery Method): room air    Labs and imaging studies noted    Assessment and plan:    SIRS  Parainfluenza infection  Elevated troponin- likely demad ischemia  CVA with residual Right sided weakness and facial droop  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 Pt is AO x 1, pleasant, currently complaining of headache and chest pain. She is not fully aware of what is going on, history provided by daughter at bedside. Daughters states that her sxs started around saturday night with cough productive of yellow sputum, chest and nasal congestion, and fever. Since then she has been having fevers at home, feeling week and fatigued. Daughter also endorses 5 episodes of loose stool today, but good appetite, denies any abd pain or blood in the stool. Patient also endorses mild genital itchiness on urination. Patient denies any other complains.    ([place]  # [id number])    in ED patient on RA 94%, low grade temp 100.4F, BP wnl. NAD, resting comfortably, not coughing.  hina heard in RLL as per ED  physician. CTA on my physical exam   CXR ? infiltrate R perihilar ( similar to prior CXR), follow official report.  Labs wbc leucopenia 2.43, platelets 146, LA wnl, mild transaminitis, mild troponin elevation trop  81. EKG NSR, no acute ST T wave changes  parainfluenza positive  s/p iv ceftriaxone, azithromycin in ED.    Vital Signs Last 24 Hrs  T(C): 37.7 (21 Jul 2022 00:45), Max: 38 (20 Jul 2022 20:50)  T(F): 99.9 (21 Jul 2022 00:45), Max: 100.4 (20 Jul 2022 20:50)  HR: 64 (21 Jul 2022 00:45) (64 - 79)  BP: 138/74 (21 Jul 2022 00:45) (138/74 - 139/43)  BP(mean): --  RR: 18 (21 Jul 2022 00:45) (18 - 18)  SpO2: 96% (21 Jul 2022 00:45) (92% - 96%)    Parameters below as of 21 Jul 2022 00:45  Patient On (Oxygen Delivery Method): room air      Labs and imaging studies noted.    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  - s/p iv NS, s/p iv ceftriaxone, azithromycin in ED.  - follow sputum cx, blood cx, urine cx  - will keep on iv ceftriaxone for UTI, patient not coughing, on RA, likely parainfluenza infection.   - follow official report CXR.  - monitor oxygen requirement and temp curve.   - airborne, contact isolation  - elevated troponin, mild, 81, EKG NSR, repeat set of cardiac enzymes. likely demand ischemia.   - telemonitoring x 24 hours,   - patient Metroprolol resume home dose with holding parameters.   - Resume Eliquis, h/o Iliac vein thrombosis,  - h/o CVA with residual R side hemiparesis, facial droop, at baseline.   rest as above

## 2022-07-21 NOTE — H&P ADULT - HISTORY OF PRESENT ILLNESS
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 Pt is AO x 1, pleasant, currently complaining of headache and chest pains. She is not fully aware of what is going on, history provided by daughter at bedside. Daughters states that her sxs started around saturday night with cough productive of yellow sputum, chest and nasal congestion, and fever. Since then she has been having fevers at home, feeling week and fatigued. Daughter also endorses 5 episodes of loose stool today, but good appetite, denies any abd pain or blood in the stool. Patient also endorses mild genital itchiness on urination. Patient denies any other complains.

## 2022-07-21 NOTE — H&P ADULT - PROBLEM SELECTOR PLAN 1
WBC < 4, T 100.4  Lactate wnl  Chest xray with R hillar infiltrate? f/u official read  RVP + for parainfluenza  S/p Ceftriaxone and azithromycin in ED  continue gentle IV fluids  Tylenol PRn  f/u Blood cultures, F/u UA and urine cultures WBC < 4, T 100.4  Lactate wnl  Chest xray with R hillar infiltrate? f/u official read  RVP + for parainfluenza  S/p Ceftriaxone and azithromycin in ED  continue gentle IV fluids  Tylenol PRn  f/u Blood cultures, F/u UA and urine cultures  willl hold abx for now

## 2022-07-21 NOTE — PROGRESS NOTE ADULT - SUBJECTIVE AND OBJECTIVE BOX
Patient is a 76y old  Female who presents with a chief complaint of Cough and Fever (2022 00:44)    INTERVAL HPI/OVERNIGHT EVENTS: no acute events ovnernight    REVIEW OF SYSTEMS:  CONSTITUTIONAL: No fever, chills  ENMT:  No difficulty hearing, no change in vision  NECK: No pain or stiffness  RESPIRATORY: No cough, SOB  CARDIOVASCULAR: No chest pain, palpitations  GASTROINTESTINAL: No abdominal pain. No nausea, vomiting, or diarrhea  GENITOURINARY: No dysuria  NEUROLOGICAL: No HA  SKIN: No itching, burning, rashes, or lesions   LYMPH NODES: No enlarged glands  ENDOCRINE: No heat or cold intolerance; No hair loss  MUSCULOSKELETAL: No joint pain or swelling; No muscle, back, or extremity pain  PSYCHIATRIC: No depression, anxiety  HEME/LYMPH: No easy bruising, or bleeding gums    T(C): 37.1 (22 @ 07:15), Max: 38 (22 @ 20:50)  HR: 62 (22 @ 07:15) (62 - 79)  BP: 136/73 (22 @ 07:15) (136/73 - 149/78)  RR: 17 (22 @ 07:15) (17 - 18)  SpO2: 95% (22 @ 07:15) (92% - 96%)  Wt(kg): --Vital Signs Last 24 Hrs  T(C): 37.1 (2022 07:15), Max: 38 (2022 20:50)  T(F): 98.7 (2022 07:15), Max: 100.4 (2022 20:50)  HR: 62 (2022 07:15) (62 - 79)  BP: 136/73 (2022 07:15) (136/73 - 149/78)  BP(mean): --  RR: 17 (2022 07:15) (17 - 18)  SpO2: 95% (2022 07:15) (92% - 96%)    Parameters below as of 2022 07:15  Patient On (Oxygen Delivery Method): room air    MEDICATIONS  (STANDING):  apixaban 5 milliGRAM(s) Oral every 12 hours  atorvastatin 20 milliGRAM(s) Oral at bedtime  cefTRIAXone Injectable. 1000 milliGRAM(s) IV Push every 24 hours  guaiFENesin Oral Liquid (Sugar-Free) 200 milliGRAM(s) Oral every 6 hours  metoprolol tartrate 12.5 milliGRAM(s) Oral two times a day  pantoprazole    Tablet 40 milliGRAM(s) Oral before breakfast  sodium chloride 0.9%. 1000 milliLiter(s) (60 mL/Hr) IV Continuous <Continuous>    MEDICATIONS  (PRN):  acetaminophen     Tablet .. 650 milliGRAM(s) Oral every 6 hours PRN Temp greater or equal to 38C (100.4F), Mild Pain (1 - 3)  ALBUTerol    90 MICROgram(s) HFA Inhaler 2 Puff(s) Inhalation every 6 hours PRN Shortness of Breath and/or Wheezing      PHYSICAL EXAM:  GENERAL: NAD  EYES: clear conjunctiva; EOMI  ENMT: Moist mucous membranes  NECK: Supple, No JVD, Normal thyroid  CHEST/LUNG: Clear to auscultation bilaterally; No rales, rhonchi, wheezing, or rubs  HEART: S1, S2, Regular rate and rhythm  ABDOMEN: Soft, Nontender, Nondistended; Bowel sounds present  NEURO: Alert & Oriented X3  EXTREMITIES: R sided weakness and r facial droop  LYMPH: No lymphadenopathy noted  SKIN: No rashes or lesions    Consultant(s) Notes Reviewed:  [x ] YES  [ ] NO  Care Discussed with Consultants/Other Providers [ x] YES  [ ] NO    LABS:                        13.3   3.36  )-----------( 136      ( 2022 06:21 )             42.0     07-21    141  |  107  |  11  ----------------------------<  122<H>  3.9   |  22  |  0.82    Ca    9.2      2022 06:21  Phos  2.8     07-21  Mg     2.0     07-21    TPro  6.7  /  Alb  3.0<L>  /  TBili  0.4  /  DBili  x   /  AST  49<H>  /  ALT  84<H>  /  AlkPhos  92  07-21      CAPILLARY BLOOD GLUCOSE    Urinalysis Basic - ( 2022 01:35 )    Color: Yellow / Appearance: Clear / S.005 / pH: x  Gluc: x / Ketone: Negative  / Bili: Negative / Urobili: Negative   Blood: x / Protein: Negative / Nitrite: Negative   Leuk Esterase: Trace / RBC: 5-10 /HPF / WBC 3-5 /HPF   Sq Epi: x / Non Sq Epi: Few /HPF / Bacteria: Moderate /HPF      RADIOLOGY & ADDITIONAL TESTS:    Imaging Personally Reviewed:  [ x] YES  [ ] NO

## 2022-07-21 NOTE — H&P ADULT - ASSESSMENT
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 Admitted due to Parainfluenza.

## 2022-07-21 NOTE — PROGRESS NOTE ADULT - PROBLEM SELECTOR PLAN 3
Pt with mild chest pain (reproducible on palpation) likely 2/2 to cough, but elevated troponins  EKG NSR, No ST changes  Trend troponin  Tele monitoring  Metoprolol 12.5 bid (need to confirm home medications)  Atorvastatin  Pt on eliquis due to Hx of thrombus

## 2022-07-21 NOTE — H&P ADULT - NSICDXFAMILYHX_GEN_ALL_CORE_FT
FAMILY HISTORY:  Father  Still living? No  FH: diabetes mellitus, Age at diagnosis: Age Unknown  FH: prostate cancer, Age at diagnosis: Age Unknown

## 2022-07-21 NOTE — H&P ADULT - NSHPPHYSICALEXAM_GEN_ALL_CORE
GENERAL: NAD, well-groomed, well-developed, elderly, pleasant  HEAD:  Atraumatic, Normocephalic  EYES: EOMI, PERRLA, conjunctiva and sclera clear  ENMT: No tonsillar erythema, exudates, or enlargement; Moist mucous membranes, Good dentition, No lesions  NECK: Supple, normal appearance, No JVD; Normal thyroid; Trachea midline  NERVOUS SYSTEM:  Alert & Oriented X1,  R sided hemiplagia, + r facial droop.   CHEST/LUNG: Lungs clear to auscultation bilaterally, No rales, rhonchi, wheezing   HEART: Regular rate and rhythm; No murmurs, rubs, or gallops  ABDOMEN: Soft, Nontender, Nondistended; Bowel sounds present  EXTREMITIES:  2+ Peripheral Pulses, No clubbing, cyanosis, or edema  LYMPH: No lymphadenopathy noted  SKIN: No rashes or lesions;  Good capillary refill

## 2022-07-21 NOTE — H&P ADULT - NSHPREVIEWOFSYSTEMS_GEN_ALL_CORE
CONSTITUTIONAL: No fever, weight loss, or fatigue  EYES: No eye pain, visual disturbances, or discharge  ENT:  + nasal congestion, mild sore throat and dysphagia.   NECK: No pain or stiffness  RESPIRATORY: + cough, no wheezing, chills or hemoptysis; + Shortness of Breath  CARDIOVASCULAR: + chest pain, no palpitations, passing out, dizziness, or leg swelling  GASTROINTESTINAL: No abdominal or epigastric pain. No nausea, vomiting, or hematemesis; + diarrhea, no constipation. No melena or hematochezia.  GENITOURINARY: + dysuria/ itchiness, no frequency, hematuria, or incontinence  NEUROLOGICAL: + headaches, no memory loss, loss of strength, numbness, or tremors, + R sided hemiplegia   SKIN: No itching, burning, rashes, or lesions   LYMPH Nodes: No enlarged glands  ENDOCRINE: No heat or cold intolerance; No hair loss  MUSCULOSKELETAL: No joint pain or swelling; No muscle, back, No extremity pain  PSYCHIATRIC: No depression, anxiety, mood swings, or difficulty sleeping  HEME/LYMPH: No easy bruising, or bleeding gums  ALLERGY AND IMMUNOLOGIC: No hives or eczema

## 2022-07-21 NOTE — PATIENT PROFILE ADULT - FALL HARM RISK - HARM RISK INTERVENTIONS

## 2022-07-22 DIAGNOSIS — Z02.9 ENCOUNTER FOR ADMINISTRATIVE EXAMINATIONS, UNSPECIFIED: ICD-10-CM

## 2022-07-22 LAB
ANION GAP SERPL CALC-SCNC: 7 MMOL/L — SIGNIFICANT CHANGE UP (ref 5–17)
BUN SERPL-MCNC: 10 MG/DL — SIGNIFICANT CHANGE UP (ref 7–18)
CALCIUM SERPL-MCNC: 8.8 MG/DL — SIGNIFICANT CHANGE UP (ref 8.4–10.5)
CHLORIDE SERPL-SCNC: 110 MMOL/L — HIGH (ref 96–108)
CO2 SERPL-SCNC: 25 MMOL/L — SIGNIFICANT CHANGE UP (ref 22–31)
CREAT SERPL-MCNC: 0.74 MG/DL — SIGNIFICANT CHANGE UP (ref 0.5–1.3)
CULTURE RESULTS: NO GROWTH — SIGNIFICANT CHANGE UP
CULTURE RESULTS: SIGNIFICANT CHANGE UP
EGFR: 84 ML/MIN/1.73M2 — SIGNIFICANT CHANGE UP
GLUCOSE SERPL-MCNC: 81 MG/DL — SIGNIFICANT CHANGE UP (ref 70–99)
HCT VFR BLD CALC: 40.2 % — SIGNIFICANT CHANGE UP (ref 34.5–45)
HGB BLD-MCNC: 12.7 G/DL — SIGNIFICANT CHANGE UP (ref 11.5–15.5)
MCHC RBC-ENTMCNC: 31.3 PG — SIGNIFICANT CHANGE UP (ref 27–34)
MCHC RBC-ENTMCNC: 31.6 GM/DL — LOW (ref 32–36)
MCV RBC AUTO: 99 FL — SIGNIFICANT CHANGE UP (ref 80–100)
NRBC # BLD: 0 /100 WBCS — SIGNIFICANT CHANGE UP (ref 0–0)
PLATELET # BLD AUTO: 138 K/UL — LOW (ref 150–400)
POTASSIUM SERPL-MCNC: 3.6 MMOL/L — SIGNIFICANT CHANGE UP (ref 3.5–5.3)
POTASSIUM SERPL-SCNC: 3.6 MMOL/L — SIGNIFICANT CHANGE UP (ref 3.5–5.3)
RBC # BLD: 4.06 M/UL — SIGNIFICANT CHANGE UP (ref 3.8–5.2)
RBC # FLD: 12.3 % — SIGNIFICANT CHANGE UP (ref 10.3–14.5)
SODIUM SERPL-SCNC: 142 MMOL/L — SIGNIFICANT CHANGE UP (ref 135–145)
SPECIMEN SOURCE: SIGNIFICANT CHANGE UP
SPECIMEN SOURCE: SIGNIFICANT CHANGE UP
WBC # BLD: 2.98 K/UL — LOW (ref 3.8–10.5)
WBC # FLD AUTO: 2.98 K/UL — LOW (ref 3.8–10.5)

## 2022-07-22 PROCEDURE — 99233 SBSQ HOSP IP/OBS HIGH 50: CPT | Mod: GC

## 2022-07-22 RX ADMIN — APIXABAN 5 MILLIGRAM(S): 2.5 TABLET, FILM COATED ORAL at 06:49

## 2022-07-22 RX ADMIN — Medication 200 MILLIGRAM(S): at 18:26

## 2022-07-22 RX ADMIN — Medication 200 MILLIGRAM(S): at 12:45

## 2022-07-22 RX ADMIN — Medication 12.5 MILLIGRAM(S): at 18:24

## 2022-07-22 RX ADMIN — ATORVASTATIN CALCIUM 20 MILLIGRAM(S): 80 TABLET, FILM COATED ORAL at 22:19

## 2022-07-22 RX ADMIN — AZITHROMYCIN 255 MILLIGRAM(S): 500 TABLET, FILM COATED ORAL at 22:19

## 2022-07-22 RX ADMIN — CEFTRIAXONE 100 MILLIGRAM(S): 500 INJECTION, POWDER, FOR SOLUTION INTRAMUSCULAR; INTRAVENOUS at 22:19

## 2022-07-22 RX ADMIN — Medication 12.5 MILLIGRAM(S): at 06:49

## 2022-07-22 RX ADMIN — PANTOPRAZOLE SODIUM 40 MILLIGRAM(S): 20 TABLET, DELAYED RELEASE ORAL at 06:49

## 2022-07-22 RX ADMIN — APIXABAN 5 MILLIGRAM(S): 2.5 TABLET, FILM COATED ORAL at 18:24

## 2022-07-22 RX ADMIN — Medication 200 MILLIGRAM(S): at 06:50

## 2022-07-22 NOTE — PROGRESS NOTE ADULT - PROBLEM SELECTOR PLAN 2
Pt with Cough, sputum production, nasal and chest congestion, fevers, headaches  + parainfluenza  Tylenol PRN  Robitusin  Albuterol PRN  IV fluids  Contact isolation + parainfluenza  Tylenol PRN  Robitusin  Albuterol PRN  IV fluids  Contact isolation

## 2022-07-22 NOTE — PROGRESS NOTE ADULT - PROBLEM SELECTOR PLAN 1
WBC < 4, T 100.4  Lactate wnl  Chest xray with R hillar infiltrate? f/u official read  RVP + for parainfluenza  S/p Ceftriaxone and azithromycin in ED  continue gentle IV fluids  Tylenol PRn  f/u Blood cultures, F/u UA and urine cultures  willl hold abx for now WBC < 4, T 100.4 on admission.   Lactate wnl  CXR shows RIGHT infrahilar focal airspace disease versus overlapping vessels.   - F/u CTA chest to r/o thoracic aortic dissection.   RVP + for parainfluenza  S/p Ceftriaxone and azithromycin in ED.   continue gentle IV fluids  Supportive care.   Tylenol PRn  f/u Blood cultures, F/u UA and urine cultures  willl hold abx for now

## 2022-07-22 NOTE — PROGRESS NOTE ADULT - SUBJECTIVE AND OBJECTIVE BOX
NP Note discussed with  Primary Attending    Patient is a 76y old  Female who presents with a chief complaint of Cough and Fever (2022 10:25)      INTERVAL HPI/OVERNIGHT EVENTS: no new complaints    MEDICATIONS  (STANDING):  apixaban 5 milliGRAM(s) Oral every 12 hours  atorvastatin 20 milliGRAM(s) Oral at bedtime  azithromycin  IVPB 500 milliGRAM(s) IV Intermittent every 24 hours  cefTRIAXone   IVPB 1000 milliGRAM(s) IV Intermittent every 24 hours  guaiFENesin Oral Liquid (Sugar-Free) 200 milliGRAM(s) Oral every 6 hours  metoprolol tartrate 12.5 milliGRAM(s) Oral two times a day  pantoprazole    Tablet 40 milliGRAM(s) Oral before breakfast  sodium chloride 0.9%. 1000 milliLiter(s) (60 mL/Hr) IV Continuous <Continuous>    MEDICATIONS  (PRN):  acetaminophen     Tablet .. 650 milliGRAM(s) Oral every 6 hours PRN Temp greater or equal to 38C (100.4F), Mild Pain (1 - 3)  ALBUTerol    90 MICROgram(s) HFA Inhaler 2 Puff(s) Inhalation every 6 hours PRN Shortness of Breath and/or Wheezing      __________________________________________________  REVIEW OF SYSTEMS:    CONSTITUTIONAL: No fever,   EYES: no acute visual disturbances  NECK: No pain or stiffness  RESPIRATORY: No cough; No shortness of breath  CARDIOVASCULAR: No chest pain, no palpitations  GASTROINTESTINAL: No pain. No nausea or vomiting; No diarrhea   NEUROLOGICAL: No headache or numbness, no tremors  MUSCULOSKELETAL: No joint pain, no muscle pain  GENITOURINARY: no dysuria, no frequency, no hesitancy  PSYCHIATRY: no depression , no anxiety  ALL OTHER  ROS negative        Vital Signs Last 24 Hrs  T(C): 36.6 (2022 12:45), Max: 37.3 (2022 20:50)  T(F): 97.8 (2022 12:45), Max: 99.2 (2022 20:50)  HR: 62 (2022 12:45) (62 - 66)  BP: 136/60 (2022 12:45) (134/96 - 149/64)  BP(mean): --  RR: 16 (2022 12:45) (16 - 18)  SpO2: 95% (2022 12:45) (93% - 97%)    Parameters below as of 2022 12:45  Patient On (Oxygen Delivery Method): room air        ________________________________________________  PHYSICAL EXAM:  GENERAL: NAD  HEENT: Normocephalic;  conjunctivae and sclerae clear; moist mucous membranes;   NECK : supple  CHEST/LUNG: Clear to auscultation bilaterally with good air entry   HEART: S1 S2  regular; no murmurs, gallops or rubs  ABDOMEN: Soft, Nontender, Nondistended; Bowel sounds present  EXTREMITIES: no cyanosis; no edema; no calf tenderness  SKIN: warm and dry; no rash  NERVOUS SYSTEM:  Awake and alert; Oriented  to place, person and time ; no new deficits    _________________________________________________  LABS:                        12.7   2.98  )-----------( 138      ( 2022 06:59 )             40.2     07-22    142  |  110<H>  |  10  ----------------------------<  81  3.6   |  25  |  0.74    Ca    8.8      2022 06:59  Phos  2.8     07-21  Mg     2.0     07-21    TPro  6.7  /  Alb  3.0<L>  /  TBili  0.4  /  DBili  x   /  AST  49<H>  /  ALT  84<H>  /  AlkPhos  92  07-21      Urinalysis Basic - ( 2022 01:35 )    Color: Yellow / Appearance: Clear / S.005 / pH: x  Gluc: x / Ketone: Negative  / Bili: Negative / Urobili: Negative   Blood: x / Protein: Negative / Nitrite: Negative   Leuk Esterase: Trace / RBC: 5-10 /HPF / WBC 3-5 /HPF   Sq Epi: x / Non Sq Epi: Few /HPF / Bacteria: Moderate /HPF      CAPILLARY BLOOD GLUCOSE    RADIOLOGY & ADDITIONAL TESTS:    Imaging Personally Reviewed:  YES    Consultant(s) Notes Reviewed:   YES    Care Discussed with Consultants :     Plan of care was discussed with patient and /or primary care giver; all questions and concerns were addressed and care was aligned with patient's wishes.     NP Note discussed with  Primary Attending    Patient is a 76y old  Female who presents with a chief complaint of Cough and Fever (2022 10:25)      INTERVAL HPI/OVERNIGHT EVENTS: Overnight c/o chest pain. Trop downtrending.     MEDICATIONS  (STANDING):  apixaban 5 milliGRAM(s) Oral every 12 hours  atorvastatin 20 milliGRAM(s) Oral at bedtime  azithromycin  IVPB 500 milliGRAM(s) IV Intermittent every 24 hours  cefTRIAXone   IVPB 1000 milliGRAM(s) IV Intermittent every 24 hours  guaiFENesin Oral Liquid (Sugar-Free) 200 milliGRAM(s) Oral every 6 hours  metoprolol tartrate 12.5 milliGRAM(s) Oral two times a day  pantoprazole    Tablet 40 milliGRAM(s) Oral before breakfast  sodium chloride 0.9%. 1000 milliLiter(s) (60 mL/Hr) IV Continuous <Continuous>    MEDICATIONS  (PRN):  acetaminophen     Tablet .. 650 milliGRAM(s) Oral every 6 hours PRN Temp greater or equal to 38C (100.4F), Mild Pain (1 - 3)  ALBUTerol    90 MICROgram(s) HFA Inhaler 2 Puff(s) Inhalation every 6 hours PRN Shortness of Breath and/or Wheezing    __________________________________________________  REVIEW OF SYSTEMS: Denies CP, palpitation. Unable to obtain a full ROS given patient's inattention and confusion.     Vital Signs Last 24 Hrs  T(C): 36.6 (2022 12:45), Max: 37.3 (2022 20:50)  T(F): 97.8 (2022 12:45), Max: 99.2 (2022 20:50)  HR: 62 (2022 12:45) (62 - 66)  BP: 136/60 (2022 12:45) (134/96 - 149/64)  BP(mean): --  RR: 16 (2022 12:45) (16 - 18)  SpO2: 95% (2022 12:45) (93% - 97%)    Parameters below as of 2022 12:45  Patient On (Oxygen Delivery Method): room air      ________________________________________________  PHYSICAL EXAM:  GENERAL: NAD, Obese.   HEENT: Normocephalic;  conjunctivae and sclerae clear; moist mucous membranes;   NECK : supple  CHEST/LUNG: Clear to auscultation bilaterally with good air entry   HEART: S1 S2  regular; no murmurs, gallops or rubs  ABDOMEN: Soft, Nontender, Nondistended; Bowel sounds present  EXTREMITIES: no cyanosis; no edema; no calf tenderness  SKIN: warm and dry; no rash  NERVOUS SYSTEM:  A&O x 1; Hx of CVA; Right-sided neglect. Follows simple commands. Inattention.      _________________________________________________  LABS:                        12.7   2.98  )-----------( 138      ( 2022 06:59 )             40.2     07-22    142  |  110<H>  |  10  ----------------------------<  81  3.6   |  25  |  0.74    Ca    8.8      2022 06:59  Phos  2.8     07-21  Mg     2.0     07-21    TPro  6.7  /  Alb  3.0<L>  /  TBili  0.4  /  DBili  x   /  AST  49<H>  /  ALT  84<H>  /  AlkPhos  92  07-21      Urinalysis Basic - ( 2022 01:35 )    Color: Yellow / Appearance: Clear / S.005 / pH: x  Gluc: x / Ketone: Negative  / Bili: Negative / Urobili: Negative   Blood: x / Protein: Negative / Nitrite: Negative   Leuk Esterase: Trace / RBC: 5-10 /HPF / WBC 3-5 /HPF   Sq Epi: x / Non Sq Epi: Few /HPF / Bacteria: Moderate /HPF      CAPILLARY BLOOD GLUCOSE    RADIOLOGY & ADDITIONAL TESTS:    Imaging Personally Reviewed:  YES    Consultant(s) Notes Reviewed:   YES    Care Discussed with Consultants :     Plan of care was discussed with patient and /or primary care giver; all questions and concerns were addressed and care was aligned with patient's wishes.

## 2022-07-22 NOTE — PROGRESS NOTE ADULT - ASSESSMENT
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 Admitted due to Parainfluenza. 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 Admitted due to Parainfluenza. CXR shows RIGHT infrahilar focal airspace disease versus overlapping vessels. Pending CTA chest to r/o thoracic aortic dissection. Patient combative during attempt to insert 20g for CT. Provider attempt. CT notified.   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 Admitted due to Parainfluenza. CXR shows RIGHT infrahilar focal airspace disease versus overlapping vessels. Pending CTA chest to r/o thoracic aortic dissection. Patient combative during attempt to insert 20g for CT. Provider attempt. CT notified. Repeat EKG  shows T wave inversion on V4, 5, and 6 otherwise NSR. Findings and plan of care discussed with attending.

## 2022-07-22 NOTE — PROGRESS NOTE ADULT - PROBLEM SELECTOR PLAN 3
Pt with mild chest pain (reproducible on palpation) likely 2/2 to cough, but elevated troponins  EKG NSR, No ST changes  Trend troponin  Tele monitoring  Metoprolol 12.5 bid (need to confirm home medications)  Atorvastatin  Pt on eliquis due to Hx of thrombus Pt with mild chest pain (reproducible on palpation) likely 2/2 to cough, but elevated troponins  - F/u Repeat EKG  - Initial EKG NSR, No ST changes.   - No longer trending trop  - trops downtrending   - Metoprolol 12.5 bid (need to confirm home medications)  - Atorvastatin  - Pt on eliquis due to Hx of thrombus

## 2022-07-23 LAB
ALBUMIN SERPL ELPH-MCNC: 2.9 G/DL — LOW (ref 3.5–5)
ALP SERPL-CCNC: 70 U/L — SIGNIFICANT CHANGE UP (ref 40–120)
ALT FLD-CCNC: 46 U/L DA — SIGNIFICANT CHANGE UP (ref 10–60)
ANION GAP SERPL CALC-SCNC: 10 MMOL/L — SIGNIFICANT CHANGE UP (ref 5–17)
AST SERPL-CCNC: 22 U/L — SIGNIFICANT CHANGE UP (ref 10–40)
BILIRUB SERPL-MCNC: 0.6 MG/DL — SIGNIFICANT CHANGE UP (ref 0.2–1.2)
BUN SERPL-MCNC: 13 MG/DL — SIGNIFICANT CHANGE UP (ref 7–18)
CALCIUM SERPL-MCNC: 9.7 MG/DL — SIGNIFICANT CHANGE UP (ref 8.4–10.5)
CHLORIDE SERPL-SCNC: 107 MMOL/L — SIGNIFICANT CHANGE UP (ref 96–108)
CO2 SERPL-SCNC: 23 MMOL/L — SIGNIFICANT CHANGE UP (ref 22–31)
CREAT SERPL-MCNC: 0.81 MG/DL — SIGNIFICANT CHANGE UP (ref 0.5–1.3)
EGFR: 76 ML/MIN/1.73M2 — SIGNIFICANT CHANGE UP
GLUCOSE SERPL-MCNC: 102 MG/DL — HIGH (ref 70–99)
HCT VFR BLD CALC: 43.3 % — SIGNIFICANT CHANGE UP (ref 34.5–45)
HGB BLD-MCNC: 13.8 G/DL — SIGNIFICANT CHANGE UP (ref 11.5–15.5)
MCHC RBC-ENTMCNC: 31.3 PG — SIGNIFICANT CHANGE UP (ref 27–34)
MCHC RBC-ENTMCNC: 31.9 GM/DL — LOW (ref 32–36)
MCV RBC AUTO: 98.2 FL — SIGNIFICANT CHANGE UP (ref 80–100)
MRSA PCR RESULT.: SIGNIFICANT CHANGE UP
NRBC # BLD: 0 /100 WBCS — SIGNIFICANT CHANGE UP (ref 0–0)
PLATELET # BLD AUTO: 148 K/UL — LOW (ref 150–400)
POTASSIUM SERPL-MCNC: 3.8 MMOL/L — SIGNIFICANT CHANGE UP (ref 3.5–5.3)
POTASSIUM SERPL-SCNC: 3.8 MMOL/L — SIGNIFICANT CHANGE UP (ref 3.5–5.3)
PROT SERPL-MCNC: 6.6 G/DL — SIGNIFICANT CHANGE UP (ref 6–8.3)
RBC # BLD: 4.41 M/UL — SIGNIFICANT CHANGE UP (ref 3.8–5.2)
RBC # FLD: 12.1 % — SIGNIFICANT CHANGE UP (ref 10.3–14.5)
S AUREUS DNA NOSE QL NAA+PROBE: SIGNIFICANT CHANGE UP
SODIUM SERPL-SCNC: 140 MMOL/L — SIGNIFICANT CHANGE UP (ref 135–145)
WBC # BLD: 3.65 K/UL — LOW (ref 3.8–10.5)
WBC # FLD AUTO: 3.65 K/UL — LOW (ref 3.8–10.5)

## 2022-07-23 PROCEDURE — 99233 SBSQ HOSP IP/OBS HIGH 50: CPT

## 2022-07-23 PROCEDURE — 71250 CT THORAX DX C-: CPT | Mod: 26

## 2022-07-23 RX ORDER — CEFUROXIME AXETIL 250 MG
250 TABLET ORAL EVERY 12 HOURS
Refills: 0 | Status: DISCONTINUED | OUTPATIENT
Start: 2022-07-23 | End: 2022-07-27

## 2022-07-23 RX ORDER — ONDANSETRON 8 MG/1
4 TABLET, FILM COATED ORAL EVERY 8 HOURS
Refills: 0 | Status: DISCONTINUED | OUTPATIENT
Start: 2022-07-23 | End: 2022-07-27

## 2022-07-23 RX ORDER — AZITHROMYCIN 500 MG/1
500 TABLET, FILM COATED ORAL DAILY
Refills: 0 | Status: DISCONTINUED | OUTPATIENT
Start: 2022-07-23 | End: 2022-07-27

## 2022-07-23 RX ORDER — CEFUROXIME AXETIL 250 MG
250 TABLET ORAL EVERY 12 HOURS
Refills: 0 | Status: DISCONTINUED | OUTPATIENT
Start: 2022-07-23 | End: 2022-07-23

## 2022-07-23 RX ORDER — ONDANSETRON 8 MG/1
4 TABLET, FILM COATED ORAL EVERY 8 HOURS
Refills: 0 | Status: DISCONTINUED | OUTPATIENT
Start: 2022-07-23 | End: 2022-07-23

## 2022-07-23 RX ORDER — AZITHROMYCIN 500 MG/1
500 TABLET, FILM COATED ORAL DAILY
Refills: 0 | Status: DISCONTINUED | OUTPATIENT
Start: 2022-07-23 | End: 2022-07-23

## 2022-07-23 RX ADMIN — PANTOPRAZOLE SODIUM 40 MILLIGRAM(S): 20 TABLET, DELAYED RELEASE ORAL at 06:46

## 2022-07-23 RX ADMIN — Medication 12.5 MILLIGRAM(S): at 05:28

## 2022-07-23 RX ADMIN — APIXABAN 5 MILLIGRAM(S): 2.5 TABLET, FILM COATED ORAL at 05:28

## 2022-07-23 RX ADMIN — Medication 200 MILLIGRAM(S): at 00:39

## 2022-07-23 RX ADMIN — ATORVASTATIN CALCIUM 20 MILLIGRAM(S): 80 TABLET, FILM COATED ORAL at 21:08

## 2022-07-23 RX ADMIN — APIXABAN 5 MILLIGRAM(S): 2.5 TABLET, FILM COATED ORAL at 18:34

## 2022-07-23 RX ADMIN — AZITHROMYCIN 500 MILLIGRAM(S): 500 TABLET, FILM COATED ORAL at 21:10

## 2022-07-23 RX ADMIN — Medication 12.5 MILLIGRAM(S): at 18:35

## 2022-07-23 RX ADMIN — ONDANSETRON 4 MILLIGRAM(S): 8 TABLET, FILM COATED ORAL at 18:05

## 2022-07-23 RX ADMIN — Medication 250 MILLIGRAM(S): at 18:34

## 2022-07-23 NOTE — CHART NOTE - NSCHARTNOTEFT_GEN_A_CORE
EVENT:  NP notified by RN pt had emesis    SUBJECTIVE:    OBJECTIVE:  Vital Signs Last 24 Hrs  T(C): 36.4 (23 Jul 2022 12:25), Max: 37.3 (23 Jul 2022 04:42)  T(F): 97.5 (23 Jul 2022 12:25), Max: 99.2 (23 Jul 2022 04:42)  HR: 62 (23 Jul 2022 12:25) (60 - 71)  BP: 130/57 (23 Jul 2022 12:25) (130/57 - 163/69)  BP(mean): --  RR: 18 (23 Jul 2022 12:25) (16 - 18)  SpO2: 97% (23 Jul 2022 12:25) (94% - 97%)    Parameters below as of 23 Jul 2022 12:25  Patient On (Oxygen Delivery Method): room air        LABS:                        13.8   3.65  )-----------( 148      ( 23 Jul 2022 06:44 )             43.3     07-23    140  |  107  |  13  ----------------------------<  102<H>  3.8   |  23  |  0.81    Ca    9.7      23 Jul 2022 06:44    TPro  6.6  /  Alb  2.9<L>  /  TBili  0.6  /  DBili  x   /  AST  22  /  ALT  46  /  AlkPhos  70  07-23      EKG:   IMAGING:    ASSESSMENT:  HPI:  Pt is a 76 y.o F wheelchair bound, HHA 24/7, with PMH of CVA with R side hemiplegia HTN, HLD, 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 Pt is AO x 1, pleasant, currently complaining of headache and chest pains. She is not fully aware of what is going on, history provided by daughter at bedside. Daughters states that her sxs started around saturday night with cough productive of yellow sputum, chest and nasal congestion, and fever. Since then she has been having fevers at home, feeling week and fatigued. Daughter also endorses 5 episodes of loose stool today, but good appetite, denies any abd pain or blood in the stool. Patient also endorses mild genital itchiness on urination. Patient denies any other complains.  (21 Jul 2022 00:44)      PLAN:   Zofran ODT 4mg PO Q8h PRN  N&V ordered.

## 2022-07-23 NOTE — PROGRESS NOTE ADULT - ASSESSMENT
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 Unable to obtain with contrast due to IV line issues. Was tried several times by RN and with US yesterday. Tried again today, Patient refuses to try anymore  Cont Eliquis   BP stable with current regimen   Pureed diet  Called patient's daughter Maulik at [phone]. Updated about current clinical status and treatment plan.      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 Unable to obtain with contrast due to IV line issues. Was tried several times by RN and with US yesterday. Tried again today, Patient refuses to try anymore  Cont Eliquis   BP stable with current regimen   Pureed diet  Called patient's daughter Maulik at [phone]. Updated about current clinical status and treatment plan. Agrees with plan. Risks and benefits of the contrast explained. Unsure if she consents for contrast. Patient has baseline dementia. Unable to walk at baseline. Patient's daughter takes care of her with a 24hr HHA. She can take care of her if sent home tomorrow until 24x7 HHA is started  Patient takes Eliquis, Metoprolol and Atorvastatin at home. These are only medication at home.      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 Unable to obtain with contrast due to IV line issues. Was tried several times by RN and with US yesterday. Tried again today, Patient refuses to try anymore  Cont Eliquis   BP stable with current regimen   Pureed diet  Called patient's daughter Maulik at [phone]. Updated about current clinical status and treatment plan. Agrees with plan. Risks and benefits of the contrast explained. Unsure if she consents for contrast. Patient has baseline dementia. Unable to walk at baseline. Patient's daughter takes care of her with a 24hr HHA. She can take care of her if sent home tomorrow until 24x7 HHA is started  Patient takes Eliquis, Metoprolol and Atorvastatin at home. These are only medications at home.

## 2022-07-23 NOTE — PROGRESS NOTE ADULT - SUBJECTIVE AND OBJECTIVE BOX
Patient is a 76y old  Female who presents with a chief complaint of Cough and Fever (22 Jul 2022 15:20)    Patient was seen and examined at bedside    used [id number]  Still complains of chest pain and cough       INTERVAL HPI/OVERNIGHT EVENTS:  T(C): 36.4 (07-23-22 @ 12:25), Max: 37.3 (07-23-22 @ 04:42)  HR: 62 (07-23-22 @ 12:25) (60 - 71)  BP: 130/57 (07-23-22 @ 12:25) (130/57 - 163/69)  RR: 18 (07-23-22 @ 12:25) (16 - 18)  SpO2: 97% (07-23-22 @ 12:25) (94% - 97%)  Wt(kg): --  I&O's Summary    22 Jul 2022 07:01  -  23 Jul 2022 07:00  --------------------------------------------------------  IN: 300 mL / OUT: 0 mL / NET: 300 mL        REVIEW OF SYSTEMS: denies fever, chills, SOB, palpitations, abdominal pain, nausea, vomiting, diarrhea, constipation, dizziness    MEDICATIONS  (STANDING):  apixaban 5 milliGRAM(s) Oral every 12 hours  atorvastatin 20 milliGRAM(s) Oral at bedtime  cefTRIAXone   IVPB 1000 milliGRAM(s) IV Intermittent every 24 hours  metoprolol tartrate 12.5 milliGRAM(s) Oral two times a day  pantoprazole    Tablet 40 milliGRAM(s) Oral before breakfast  sodium chloride 0.9%. 1000 milliLiter(s) (60 mL/Hr) IV Continuous <Continuous>    MEDICATIONS  (PRN):  acetaminophen     Tablet .. 650 milliGRAM(s) Oral every 6 hours PRN Temp greater or equal to 38C (100.4F), Mild Pain (1 - 3)  ALBUTerol    90 MICROgram(s) HFA Inhaler 2 Puff(s) Inhalation every 6 hours PRN Shortness of Breath and/or Wheezing      PHYSICAL EXAM:  GENERAL: NAD, well-groomed, well-developed  HEAD:  Atraumatic, Normocephalic  NERVOUS SYSTEM:  Alert & Oriented X1, right sided residual weakness  CHEST/LUNG: Clear to auscultation anterolaterally  HEART: Regular rate and rhythm; No murmurs, rubs, or gallops  ABDOMEN: Soft, Nontender, Nondistended; Bowel sounds present  EXTREMITIES:  2+ Peripheral Pulses, No clubbing, cyanosis, or edema  SKIN: No rashes or lesions    LABS:                        13.8   3.65  )-----------( 148      ( 23 Jul 2022 06:44 )             43.3     07-23    140  |  107  |  13  ----------------------------<  102<H>  3.8   |  23  |  0.81    Ca    9.7      23 Jul 2022 06:44    TPro  6.6  /  Alb  2.9<L>  /  TBili  0.6  /  DBili  x   /  AST  22  /  ALT  46  /  AlkPhos  70  07-23        CAPILLARY BLOOD GLUCOSE

## 2022-07-24 LAB
ANION GAP SERPL CALC-SCNC: 10 MMOL/L — SIGNIFICANT CHANGE UP (ref 5–17)
BASOPHILS # BLD AUTO: 0.01 K/UL — SIGNIFICANT CHANGE UP (ref 0–0.2)
BASOPHILS NFR BLD AUTO: 0.3 % — SIGNIFICANT CHANGE UP (ref 0–2)
BUN SERPL-MCNC: 14 MG/DL — SIGNIFICANT CHANGE UP (ref 7–18)
CALCIUM SERPL-MCNC: 9.5 MG/DL — SIGNIFICANT CHANGE UP (ref 8.4–10.5)
CHLORIDE SERPL-SCNC: 107 MMOL/L — SIGNIFICANT CHANGE UP (ref 96–108)
CO2 SERPL-SCNC: 24 MMOL/L — SIGNIFICANT CHANGE UP (ref 22–31)
CREAT SERPL-MCNC: 0.71 MG/DL — SIGNIFICANT CHANGE UP (ref 0.5–1.3)
EGFR: 89 ML/MIN/1.73M2 — SIGNIFICANT CHANGE UP
EOSINOPHIL # BLD AUTO: 0.04 K/UL — SIGNIFICANT CHANGE UP (ref 0–0.5)
EOSINOPHIL NFR BLD AUTO: 1.2 % — SIGNIFICANT CHANGE UP (ref 0–6)
GLUCOSE BLDC GLUCOMTR-MCNC: 98 MG/DL — SIGNIFICANT CHANGE UP (ref 70–99)
GLUCOSE SERPL-MCNC: 116 MG/DL — HIGH (ref 70–99)
HCT VFR BLD CALC: 42.3 % — SIGNIFICANT CHANGE UP (ref 34.5–45)
HGB BLD-MCNC: 14.1 G/DL — SIGNIFICANT CHANGE UP (ref 11.5–15.5)
IMM GRANULOCYTES NFR BLD AUTO: 0.6 % — SIGNIFICANT CHANGE UP (ref 0–1.5)
LYMPHOCYTES # BLD AUTO: 0.93 K/UL — LOW (ref 1–3.3)
LYMPHOCYTES # BLD AUTO: 28.7 % — SIGNIFICANT CHANGE UP (ref 13–44)
MCHC RBC-ENTMCNC: 31.5 PG — SIGNIFICANT CHANGE UP (ref 27–34)
MCHC RBC-ENTMCNC: 33.3 GM/DL — SIGNIFICANT CHANGE UP (ref 32–36)
MCV RBC AUTO: 94.4 FL — SIGNIFICANT CHANGE UP (ref 80–100)
MONOCYTES # BLD AUTO: 0.38 K/UL — SIGNIFICANT CHANGE UP (ref 0–0.9)
MONOCYTES NFR BLD AUTO: 11.7 % — SIGNIFICANT CHANGE UP (ref 2–14)
NEUTROPHILS # BLD AUTO: 1.86 K/UL — SIGNIFICANT CHANGE UP (ref 1.8–7.4)
NEUTROPHILS NFR BLD AUTO: 57.5 % — SIGNIFICANT CHANGE UP (ref 43–77)
NRBC # BLD: 0 /100 WBCS — SIGNIFICANT CHANGE UP (ref 0–0)
PLATELET # BLD AUTO: 181 K/UL — SIGNIFICANT CHANGE UP (ref 150–400)
POTASSIUM SERPL-MCNC: 3.7 MMOL/L — SIGNIFICANT CHANGE UP (ref 3.5–5.3)
POTASSIUM SERPL-SCNC: 3.7 MMOL/L — SIGNIFICANT CHANGE UP (ref 3.5–5.3)
RBC # BLD: 4.48 M/UL — SIGNIFICANT CHANGE UP (ref 3.8–5.2)
RBC # FLD: 12.1 % — SIGNIFICANT CHANGE UP (ref 10.3–14.5)
SARS-COV-2 RNA SPEC QL NAA+PROBE: SIGNIFICANT CHANGE UP
SODIUM SERPL-SCNC: 141 MMOL/L — SIGNIFICANT CHANGE UP (ref 135–145)
WBC # BLD: 3.24 K/UL — LOW (ref 3.8–10.5)
WBC # FLD AUTO: 3.24 K/UL — LOW (ref 3.8–10.5)

## 2022-07-24 PROCEDURE — 99233 SBSQ HOSP IP/OBS HIGH 50: CPT

## 2022-07-24 RX ADMIN — Medication 650 MILLIGRAM(S): at 19:21

## 2022-07-24 RX ADMIN — Medication 250 MILLIGRAM(S): at 05:19

## 2022-07-24 RX ADMIN — Medication 12.5 MILLIGRAM(S): at 18:17

## 2022-07-24 RX ADMIN — APIXABAN 5 MILLIGRAM(S): 2.5 TABLET, FILM COATED ORAL at 18:16

## 2022-07-24 RX ADMIN — PANTOPRAZOLE SODIUM 40 MILLIGRAM(S): 20 TABLET, DELAYED RELEASE ORAL at 05:19

## 2022-07-24 RX ADMIN — Medication 12.5 MILLIGRAM(S): at 05:19

## 2022-07-24 RX ADMIN — AZITHROMYCIN 500 MILLIGRAM(S): 500 TABLET, FILM COATED ORAL at 12:28

## 2022-07-24 RX ADMIN — APIXABAN 5 MILLIGRAM(S): 2.5 TABLET, FILM COATED ORAL at 05:19

## 2022-07-24 RX ADMIN — Medication 250 MILLIGRAM(S): at 18:17

## 2022-07-24 RX ADMIN — ATORVASTATIN CALCIUM 20 MILLIGRAM(S): 80 TABLET, FILM COATED ORAL at 21:05

## 2022-07-24 RX ADMIN — Medication 650 MILLIGRAM(S): at 18:21

## 2022-07-24 NOTE — PROGRESS NOTE ADULT - ASSESSMENT
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 These are only medications at home  Patient has 24x7 HHA, CM and SW for safe discharge  Family has not consented for contrast

## 2022-07-24 NOTE — PROGRESS NOTE ADULT - SUBJECTIVE AND OBJECTIVE BOX
Patient is a 76y old  Female who presents with a chief complaint of Cough and Fever (23 Jul 2022 13:44)    Patient was seen and examined at bedside    used [id number]  Complains of headache   Chest pain improved     INTERVAL HPI/OVERNIGHT EVENTS:  T(C): 36.4 (07-24-22 @ 12:56), Max: 37.3 (07-24-22 @ 04:43)  HR: 67 (07-24-22 @ 12:56) (60 - 67)  BP: 123/66 (07-24-22 @ 12:56) (123/66 - 152/73)  RR: 17 (07-24-22 @ 04:43) (17 - 18)  SpO2: 96% (07-24-22 @ 04:43) (95% - 96%)  Wt(kg): --  I&O's Summary      REVIEW OF SYSTEMS: not able to communicate properly, denies nausea    MEDICATIONS  (STANDING):  apixaban 5 milliGRAM(s) Oral every 12 hours  atorvastatin 20 milliGRAM(s) Oral at bedtime  azithromycin   Tablet 500 milliGRAM(s) Oral daily  cefuroxime   Tablet 250 milliGRAM(s) Oral every 12 hours  metoprolol tartrate 12.5 milliGRAM(s) Oral two times a day  pantoprazole    Tablet 40 milliGRAM(s) Oral before breakfast  sodium chloride 0.9%. 1000 milliLiter(s) (60 mL/Hr) IV Continuous <Continuous>    MEDICATIONS  (PRN):  acetaminophen     Tablet .. 650 milliGRAM(s) Oral every 6 hours PRN Temp greater or equal to 38C (100.4F), Mild Pain (1 - 3)  ALBUTerol    90 MICROgram(s) HFA Inhaler 2 Puff(s) Inhalation every 6 hours PRN Shortness of Breath and/or Wheezing  ondansetron   Disintegrating Tablet 4 milliGRAM(s) Oral every 8 hours PRN Nausea and/or Vomiting      PHYSICAL EXAM:  GENERAL: NAD, well-groomed, well-developed  HEAD:  Atraumatic, Normocephalic  NERVOUS SYSTEM:  Alert & Oriented X1, right sided residual weakness from previous stroke   CHEST/LUNG: Clear to auscultation bilaterally; No rales, rhonchi, wheezing, or rubs  HEART: Regular rate and rhythm; No murmurs, rubs, or gallops  ABDOMEN: Soft, Nontender, Nondistended; Bowel sounds present  EXTREMITIES:  2+ Peripheral Pulses, No clubbing, cyanosis, or edema  SKIN: No rashes or lesions    LABS:                        14.1   3.24  )-----------( 181      ( 24 Jul 2022 06:24 )             42.3       141  |  107  |  14  ----------------------------<  116<H>  3.7   |  24  |  0.71    Ca    9.5      24 Jul 2022 06:24    TPro  6.6  /  Alb  2.9<L>  /  TBili  0.6  /  DBili  x   /  AST  22  /  ALT  46  /  AlkPhos  70  07-23        CAPILLARY BLOOD GLUCOSE

## 2022-07-25 ENCOUNTER — TRANSCRIPTION ENCOUNTER (OUTPATIENT)
Age: 77
End: 2022-07-25

## 2022-07-25 DIAGNOSIS — J18.9 PNEUMONIA, UNSPECIFIED ORGANISM: ICD-10-CM

## 2022-07-25 LAB
HCT VFR BLD CALC: 43.4 % — SIGNIFICANT CHANGE UP (ref 34.5–45)
HGB BLD-MCNC: 14.4 G/DL — SIGNIFICANT CHANGE UP (ref 11.5–15.5)
MCHC RBC-ENTMCNC: 32.3 PG — SIGNIFICANT CHANGE UP (ref 27–34)
MCHC RBC-ENTMCNC: 33.2 GM/DL — SIGNIFICANT CHANGE UP (ref 32–36)
MCV RBC AUTO: 97.3 FL — SIGNIFICANT CHANGE UP (ref 80–100)
NRBC # BLD: 0 /100 WBCS — SIGNIFICANT CHANGE UP (ref 0–0)
PLATELET # BLD AUTO: 198 K/UL — SIGNIFICANT CHANGE UP (ref 150–400)
RBC # BLD: 4.46 M/UL — SIGNIFICANT CHANGE UP (ref 3.8–5.2)
RBC # FLD: 12.2 % — SIGNIFICANT CHANGE UP (ref 10.3–14.5)
WBC # BLD: 5.27 K/UL — SIGNIFICANT CHANGE UP (ref 3.8–10.5)
WBC # FLD AUTO: 5.27 K/UL — SIGNIFICANT CHANGE UP (ref 3.8–10.5)

## 2022-07-25 PROCEDURE — 71045 X-RAY EXAM CHEST 1 VIEW: CPT | Mod: 26

## 2022-07-25 PROCEDURE — 99233 SBSQ HOSP IP/OBS HIGH 50: CPT

## 2022-07-25 RX ORDER — ACETAMINOPHEN 500 MG
2 TABLET ORAL
Qty: 0 | Refills: 0 | DISCHARGE
Start: 2022-07-25

## 2022-07-25 RX ORDER — PANTOPRAZOLE SODIUM 20 MG/1
1 TABLET, DELAYED RELEASE ORAL
Qty: 0 | Refills: 0 | DISCHARGE

## 2022-07-25 RX ORDER — AZITHROMYCIN 500 MG/1
1 TABLET, FILM COATED ORAL
Qty: 3 | Refills: 0
Start: 2022-07-25 | End: 2022-07-27

## 2022-07-25 RX ORDER — METOPROLOL TARTRATE 50 MG
1 TABLET ORAL
Qty: 0 | Refills: 0 | DISCHARGE

## 2022-07-25 RX ORDER — CEFUROXIME AXETIL 250 MG
1 TABLET ORAL
Qty: 6 | Refills: 0
Start: 2022-07-25 | End: 2022-07-27

## 2022-07-25 RX ORDER — METOPROLOL TARTRATE 50 MG
0.5 TABLET ORAL
Qty: 30 | Refills: 0
Start: 2022-07-25 | End: 2022-08-23

## 2022-07-25 RX ORDER — ATORVASTATIN CALCIUM 80 MG/1
1 TABLET, FILM COATED ORAL
Qty: 14 | Refills: 0
Start: 2022-07-25 | End: 2022-08-07

## 2022-07-25 RX ORDER — ALBUTEROL 90 UG/1
2 AEROSOL, METERED ORAL
Qty: 1 | Refills: 0
Start: 2022-07-25 | End: 2022-08-23

## 2022-07-25 RX ORDER — ALBUTEROL 90 UG/1
2 AEROSOL, METERED ORAL EVERY 6 HOURS
Refills: 0 | Status: DISCONTINUED | OUTPATIENT
Start: 2022-07-25 | End: 2022-07-27

## 2022-07-25 RX ORDER — SERTRALINE 25 MG/1
25 TABLET, FILM COATED ORAL DAILY
Refills: 0 | Status: DISCONTINUED | OUTPATIENT
Start: 2022-07-25 | End: 2022-07-27

## 2022-07-25 RX ADMIN — ALBUTEROL 2 PUFF(S): 90 AEROSOL, METERED ORAL at 21:52

## 2022-07-25 RX ADMIN — ALBUTEROL 2 PUFF(S): 90 AEROSOL, METERED ORAL at 15:33

## 2022-07-25 RX ADMIN — APIXABAN 5 MILLIGRAM(S): 2.5 TABLET, FILM COATED ORAL at 05:34

## 2022-07-25 RX ADMIN — SERTRALINE 25 MILLIGRAM(S): 25 TABLET, FILM COATED ORAL at 12:53

## 2022-07-25 RX ADMIN — PANTOPRAZOLE SODIUM 40 MILLIGRAM(S): 20 TABLET, DELAYED RELEASE ORAL at 05:34

## 2022-07-25 RX ADMIN — Medication 12.5 MILLIGRAM(S): at 05:35

## 2022-07-25 RX ADMIN — AZITHROMYCIN 500 MILLIGRAM(S): 500 TABLET, FILM COATED ORAL at 11:19

## 2022-07-25 RX ADMIN — Medication 250 MILLIGRAM(S): at 19:39

## 2022-07-25 RX ADMIN — ATORVASTATIN CALCIUM 20 MILLIGRAM(S): 80 TABLET, FILM COATED ORAL at 21:52

## 2022-07-25 RX ADMIN — Medication 250 MILLIGRAM(S): at 05:34

## 2022-07-25 RX ADMIN — Medication 12.5 MILLIGRAM(S): at 19:40

## 2022-07-25 RX ADMIN — APIXABAN 5 MILLIGRAM(S): 2.5 TABLET, FILM COATED ORAL at 19:39

## 2022-07-25 RX ADMIN — Medication 200 MILLIGRAM(S): at 03:04

## 2022-07-25 NOTE — DISCHARGE NOTE PROVIDER - PROVIDER TOKENS
FREE:[LAST:[Dr. Kavin Rodrigues],FIRST:[PCP],PHONE:[phone]],FAX:[(   )    -],ADDRESS:[[place]],FOLLOWUP:[1 week],ESTABLISHEDPATIENT:[T]] FREE:[LAST:[Dr. Kavin Rodrigues],FIRST:[PCP],PHONE:[phone]],FAX:[(   )    -],ADDRESS:[[place]],FOLLOWUP:[1 week],ESTABLISHEDPATIENT:[T]],PROVIDER:[[id number]],FOLLOWUP:[1 week]]

## 2022-07-25 NOTE — PROGRESS NOTE ADULT - PROBLEM SELECTOR PLAN 7
DVT ppx: Pt on eliquis for iliac vein thrombosis  GI ppx: PPI
on eliquis due to hx of illiac vein thrombosis  protonix
on eliquis due to hx of illiac vein thrombosis  protonix

## 2022-07-25 NOTE — DISCHARGE NOTE PROVIDER - NSDCCPCAREPLAN_GEN_ALL_CORE_FT
PRINCIPAL DISCHARGE DIAGNOSIS  Diagnosis: RLL pneumonia  Assessment and Plan of Treatment: Chest xray shows right pneumonia.   Continue antibiotics Azithromycin and cefuroxime thru July 27th.   Follow-up with your primary care physician within 1 week. Call for appointment.  Report to your doctor or seek immediate medical attention if you develop any changes in your condition and or worsening signs/symptoms (such as and not limited : shortness of breath, cough, fever).        SECONDARY DISCHARGE DIAGNOSES  Diagnosis: Acute viral syndrome  Assessment and Plan of Treatment: due to Parainfluenza  infection  Continue supportive measures.   Maintain adequate hydration, nutrition, and rest.   Acitvity as tolerated.   Follow-up with your primary care physician within 1 week. Call for appointment.      Diagnosis: HTN (hypertension)  Assessment and Plan of Treatment: Your blood pressure has been controlled on reduced anti -hypertension medication metoprolol. Continue metoprolol 12.5mg twice daily.   Continue low salt diet  Activity as tolerated.    Follow-up with your primary care physician within 1 week. Call for appointment.      Diagnosis: Demand ischemia  Assessment and Plan of Treatment: Due to parainfluenza infection and pneumonia.   Your EKG shows normal sinus rhythym.   continue eliquis, metoprolol, and atorvastatin.    Follow-up with your primary care physician within 1 week. Call for appointment.    Diagnosis: Cerebrovascular accident (CVA)  Assessment and Plan of Treatment: Continue medications as prescribed.   Safety measures.   Follow-up with your primary care physician within 1 week. Call for appointment.      Diagnosis: Hyperglycemia  Assessment and Plan of Treatment:      PRINCIPAL DISCHARGE DIAGNOSIS  Diagnosis: RLL pneumonia  Assessment and Plan of Treatment: Chest xray shows right pneumonia.   Continue antibiotics Azithromycin and cefuroxime thru July 28th.   Follow-up with your primary care physician within 1 week. Call for appointment.  Report to your doctor or seek immediate medical attention if you develop any changes in your condition and or worsening signs/symptoms (such as and not limited : shortness of breath, cough, fever).        SECONDARY DISCHARGE DIAGNOSES  Diagnosis: Acute viral syndrome  Assessment and Plan of Treatment: due to Parainfluenza  infection  Continue supportive measures.   Maintain adequate hydration, nutrition, and rest.   Acitvity as tolerated.   Follow-up with your primary care physician within 1 week. Call for appointment.      Diagnosis: Hyperglycemia  Assessment and Plan of Treatment:     Diagnosis: HTN (hypertension)  Assessment and Plan of Treatment: Your blood pressure has been controlled on reduced anti -hypertension medication metoprolol. Continue metoprolol 12.5mg twice daily.   Continue low salt diet  Activity as tolerated.    Follow-up with your primary care physician within 1 week. Call for appointment.      Diagnosis: Demand ischemia  Assessment and Plan of Treatment: Due to parainfluenza infection and pneumonia.   Your EKG shows normal sinus rhythym.   continue eliquis, metoprolol, and atorvastatin.    Follow-up with your primary care physician within 1 week. Call for appointment.    Diagnosis: Cerebrovascular accident (CVA)  Assessment and Plan of Treatment: Continue medications as prescribed.   Safety measures.   Follow-up with your primary care physician within 1 week. Call for appointment.

## 2022-07-25 NOTE — DISCHARGE NOTE NURSING/CASE MANAGEMENT/SOCIAL WORK - NSDCPEFALRISK_GEN_ALL_CORE
For information on Fall & Injury Prevention, visit: [email or web address] OR  [email or web address] OR  https://www.cdc.gov/steadi/patient.html

## 2022-07-25 NOTE — DISCHARGE NOTE PROVIDER - NSDCMRMEDTOKEN_GEN_ALL_CORE_FT
acetaminophen 325 mg oral tablet: 2 tab(s) orally every 6 hours, As needed, Temp greater or equal to 38C (100.4F), Mild Pain (1 - 3)  apixaban 5 mg oral tablet: 1 tab(s) orally 2 times a day ,   atorvastatin 20 mg oral tablet: 1 tab(s) orally once a day (at bedtime)  azithromycin 500 mg oral tablet: 1 tab(s) orally once a day thru July 27th  cefuroxime 250 mg oral tablet: 1 tab(s) orally every 12 hours thru July 27th  folic acid 1 mg oral tablet: 1 tab(s) orally   guaiFENesin 100 mg/5 mL oral liquid: 10 milliliter(s) orally every 6 hours, As needed, Cough  metoprolol tartrate 25 mg oral tablet: 0.5 tab(s) orally 2 times a day . Follow-up with your primary care doctor for contined management of hypertension  pantoprazole 40 mg oral delayed release tablet: 1 tab(s) orally once a day  senna oral tablet: 2 tab(s) orally once a day (at bedtime)  sertraline 25 mg oral tablet: 1 tab(s) orally once a day (at bedtime)   acetaminophen 325 mg oral tablet: 2 tab(s) orally every 6 hours, As needed, Temp greater or equal to 38C (100.4F), Mild Pain (1 - 3)  apixaban 5 mg oral tablet: 1 tab(s) orally 2 times a day ,   atorvastatin 20 mg oral tablet: 1 tab(s) orally once a day (at bedtime)  azithromycin 500 mg oral tablet: 1 tab(s) orally once a day thru July 27th  cefuroxime 250 mg oral tablet: 1 tab(s) orally every 12 hours thru July 27th  folic acid 1 mg oral tablet: 1 tab(s) orally once a day  guaiFENesin 100 mg/5 mL oral liquid: 10 milliliter(s) orally every 6 hours, As needed, Cough  metoprolol tartrate 25 mg oral tablet: 0.5 tab(s) orally 2 times a day . Follow-up with your primary care doctor for contined management of hypertension  pantoprazole 40 mg oral delayed release tablet: 1 tab(s) orally once a day  senna oral tablet: 2 tab(s) orally once a day (at bedtime)  sertraline 25 mg oral tablet: 1 tab(s) orally once a day (at bedtime)   acetaminophen 325 mg oral tablet: 2 tab(s) orally every 6 hours, As needed, Temp greater or equal to 38C (100.4F), Mild Pain (1 - 3)  albuterol 90 mcg/inh inhalation aerosol: 2 puff(s) inhaled every 6 hours as needed for shortness of breath and or wheezing  apixaban 5 mg oral tablet: 1 tab(s) orally 2 times a day ,   atorvastatin 20 mg oral tablet: 1 tab(s) orally once a day (at bedtime)  azithromycin 500 mg oral tablet: 1 tab(s) orally once a day thru July 27th  cefuroxime 250 mg oral tablet: 1 tab(s) orally every 12 hours thru July 27th  folic acid 1 mg oral tablet: 1 tab(s) orally once a day  guaiFENesin 100 mg/5 mL oral liquid: 10 milliliter(s) orally every 6 hours, As needed, Cough  metoprolol tartrate 25 mg oral tablet: 0.5 tab(s) orally 2 times a day . Follow-up with your primary care doctor for contined management of hypertension  pantoprazole 40 mg oral delayed release tablet: 1 tab(s) orally once a day  senna oral tablet: 2 tab(s) orally once a day (at bedtime)  sertraline 25 mg oral tablet: 1 tab(s) orally once a day (at bedtime)   acetaminophen 325 mg oral tablet: 2 tab(s) orally every 6 hours, As needed, Temp greater or equal to 38C (100.4F), Mild Pain (1 - 3)  albuterol 90 mcg/inh inhalation aerosol: 2 puff(s) inhaled every 6 hours as needed for shortness of breath and or wheezing  apixaban 5 mg oral tablet: 1 tab(s) orally 2 times a day ,   atorvastatin 20 mg oral tablet: 1 tab(s) orally once a day (at bedtime)  azithromycin 500 mg oral tablet: 1 tab(s) orally once a day thru July 28th  cefuroxime 250 mg oral tablet: 1 tab(s) orally every 12 hours thru July 28th  folic acid 1 mg oral tablet: 1 tab(s) orally once a day  guaiFENesin 100 mg/5 mL oral liquid: 10 milliliter(s) orally every 6 hours, As needed, Cough  metoprolol tartrate 25 mg oral tablet: 0.5 tab(s) orally 2 times a day . Follow-up with your primary care doctor for contined management of hypertension  pantoprazole 40 mg oral delayed release tablet: 1 tab(s) orally once a day  senna oral tablet: 2 tab(s) orally once a day (at bedtime)  sertraline 25 mg oral tablet: 1 tab(s) orally once a day (at bedtime)

## 2022-07-25 NOTE — DISCHARGE NOTE PROVIDER - CARE PROVIDER_API CALL
Dr. Kavin Rodrigues, PCP  [place]  Phone: [phone]  Fax: (   )    -  Established Patient  Follow Up Time: 1 week   Dr. Kavin Rodrigues, PCP  [place]  Phone: [phone]  Fax: (   )    -  Established Patient  Follow Up Time: 1 week    Maria Isabel Evans)  Critical Care Medicine; Pulmonary Disease  [place] [address]  Phone: [phone]  Fax: [phone]  Follow Up Time: 1 week

## 2022-07-25 NOTE — PROGRESS NOTE ADULT - SUBJECTIVE AND OBJECTIVE BOX
NP Note, discussed with Primary Attending.     Patient is a 76y old  Female who presents with a chief complaint of Cough and Fever (25 Jul 2022 11:32)      INTERVAL HPI/OVERNIGHT EVENTS: increased coughing    MEDICATIONS  (STANDING):  ALBUTerol    90 MICROgram(s) HFA Inhaler 2 Puff(s) Inhalation every 6 hours  apixaban 5 milliGRAM(s) Oral every 12 hours  atorvastatin 20 milliGRAM(s) Oral at bedtime  azithromycin   Tablet 500 milliGRAM(s) Oral daily  cefuroxime   Tablet 250 milliGRAM(s) Oral every 12 hours  metoprolol tartrate 12.5 milliGRAM(s) Oral two times a day  pantoprazole    Tablet 40 milliGRAM(s) Oral before breakfast  sertraline 25 milliGRAM(s) Oral daily  sodium chloride 0.9%. 1000 milliLiter(s) (60 mL/Hr) IV Continuous <Continuous>    MEDICATIONS  (PRN):  acetaminophen     Tablet .. 650 milliGRAM(s) Oral every 6 hours PRN Temp greater or equal to 38C (100.4F), Mild Pain (1 - 3)  guaiFENesin Oral Liquid (Sugar-Free) 200 milliGRAM(s) Oral every 6 hours PRN Cough  ondansetron   Disintegrating Tablet 4 milliGRAM(s) Oral every 8 hours PRN Nausea and/or Vomiting      __________________________________________________  REVIEW OF SYSTEMS:    CONSTITUTIONAL: No fever,   EYES: no acute visual disturbances  NECK: No pain or stiffness  RESPIRATORY: (+) cough; No shortness of breath  CARDIOVASCULAR: No chest pain, no palpitations  GASTROINTESTINAL: No pain. No nausea or vomiting; No diarrhea   NEUROLOGICAL: No headache or numbness, no tremors  MUSCULOSKELETAL: No joint pain, no muscle pain  GENITOURINARY: no dysuria, no frequency, no hesitancy  PSYCHIATRY: crying, depression , no anxiety  ALL OTHER  ROS negative        Vital Signs Last 24 Hrs  T(C): 36.8 (25 Jul 2022 12:30), Max: 37.3 (24 Jul 2022 20:53)  T(F): 98.2 (25 Jul 2022 12:30), Max: 99.1 (24 Jul 2022 20:53)  HR: 70 (25 Jul 2022 12:30) (59 - 70)  BP: 131/64 (25 Jul 2022 12:30) (126/55 - 143/63)  BP(mean): --  RR: 17 (25 Jul 2022 12:30) (16 - 17)  SpO2: 94% (25 Jul 2022 12:30) (94% - 96%)    Parameters below as of 25 Jul 2022 12:30  Patient On (Oxygen Delivery Method): room air        ________________________________________________  PHYSICAL EXAM:  GENERAL: generalized weakness  HEENT: Normocephalic;  conjunctivae and sclerae clear; moist mucous membranes;   NECK : supple  CHEST/LUNG: (+) air entry upper lobes, decreased to bases on  auscultation bilaterally . No rales, no wheezing.   HEART: S1 S2  regular; no murmurs, gallops or rubs  ABDOMEN: Soft, Nontender, Nondistended; Bowel sounds present  EXTREMITIES: no cyanosis; no edema; no calf tenderness  SKIN: warm and dry; no rash  NERVOUS SYSTEM:  Awake and alert; no new deficits    _________________________________________________  LABS:                        14.1   3.24  )-----------( 181      ( 24 Jul 2022 06:24 )             42.3     07-24    141  |  107  |  14  ----------------------------<  116<H>  3.7   |  24  |  0.71    Ca    9.5      24 Jul 2022 06:24          CAPILLARY BLOOD GLUCOSE      POCT Blood Glucose.: 98 mg/dL (24 Jul 2022 17:01)        RADIOLOGY & ADDITIONAL TESTS:        Consultant(s) Notes Reviewed:   YES/ No    Care Discussed with Consultants :     Plan of care was discussed with patient and /or primary care giver; all questions and concerns were addressed and care was aligned with patient's wishes.     NP Note, discussed with Primary Attending.     Patient is a 76y old  Female who presents with a chief complaint of Cough and Fever (25 Jul 2022 11:32)      INTERVAL HPI/OVERNIGHT EVENTS: increased coughing    MEDICATIONS  (STANDING):  ALBUTerol    90 MICROgram(s) HFA Inhaler 2 Puff(s) Inhalation every 6 hours  apixaban 5 milliGRAM(s) Oral every 12 hours  atorvastatin 20 milliGRAM(s) Oral at bedtime  azithromycin   Tablet 500 milliGRAM(s) Oral daily  cefuroxime   Tablet 250 milliGRAM(s) Oral every 12 hours  metoprolol tartrate 12.5 milliGRAM(s) Oral two times a day  pantoprazole    Tablet 40 milliGRAM(s) Oral before breakfast  sertraline 25 milliGRAM(s) Oral daily  sodium chloride 0.9%. 1000 milliLiter(s) (60 mL/Hr) IV Continuous <Continuous>    MEDICATIONS  (PRN):  acetaminophen     Tablet .. 650 milliGRAM(s) Oral every 6 hours PRN Temp greater or equal to 38C (100.4F), Mild Pain (1 - 3)  guaiFENesin Oral Liquid (Sugar-Free) 200 milliGRAM(s) Oral every 6 hours PRN Cough  ondansetron   Disintegrating Tablet 4 milliGRAM(s) Oral every 8 hours PRN Nausea and/or Vomiting      __________________________________________________  REVIEW OF SYSTEMS:    CONSTITUTIONAL: No fever,   EYES: no acute visual disturbances  NECK: No pain or stiffness  RESPIRATORY: (+) cough with whitish sputum; No shortness of breath  CARDIOVASCULAR: No chest pain, no palpitations  GASTROINTESTINAL: No pain. No nausea or vomiting; No diarrhea   NEUROLOGICAL: No headache or numbness, no tremors  MUSCULOSKELETAL: No joint pain, no muscle pain  GENITOURINARY: no dysuria, no frequency, no hesitancy  PSYCHIATRY: crying, depression , no anxiety  ALL OTHER  ROS negative        Vital Signs Last 24 Hrs  T(C): 36.8 (25 Jul 2022 12:30), Max: 37.3 (24 Jul 2022 20:53)  T(F): 98.2 (25 Jul 2022 12:30), Max: 99.1 (24 Jul 2022 20:53)  HR: 70 (25 Jul 2022 12:30) (59 - 70)  BP: 131/64 (25 Jul 2022 12:30) (126/55 - 143/63)  BP(mean): --  RR: 17 (25 Jul 2022 12:30) (16 - 17)  SpO2: 94% (25 Jul 2022 12:30) (94% - 96%)    Parameters below as of 25 Jul 2022 12:30  Patient On (Oxygen Delivery Method): room air        ________________________________________________  PHYSICAL EXAM:  GENERAL: generalized weakness  HEENT: Normocephalic;  conjunctivae and sclerae clear; moist mucous membranes;   NECK : supple  CHEST/LUNG: (+) air entry upper lobes, decreased to bases on  auscultation bilaterally . No rales, no wheezing.   HEART: S1 S2  regular; no murmurs, gallops or rubs  ABDOMEN: Soft, Nontender, Nondistended; Bowel sounds present  EXTREMITIES: no cyanosis; no edema; no calf tenderness  SKIN: warm and dry; no rash  NERVOUS SYSTEM:  Awake and alert; no new deficits    _________________________________________________  LABS:                        14.1   3.24  )-----------( 181      ( 24 Jul 2022 06:24 )             42.3     07-24    141  |  107  |  14  ----------------------------<  116<H>  3.7   |  24  |  0.71    Ca    9.5      24 Jul 2022 06:24    CAPILLARY BLOOD GLUCOSE    POCT Blood Glucose.: 98 mg/dL (24 Jul 2022 17:01)    RADIOLOGY & ADDITIONAL TESTS:    < from: Xray Chest 1 View-PORTABLE IMMEDIATE (Xray Chest 1 View-PORTABLE IMMEDIATE .) (07.25.22 @ 15:18) >    IMPRESSION:  Bilateral lower lobe interstitial infiltrates/areas of platelike   atelectasis, right greater than left, increased bilaterally    < end of copied text >  < from: CT Chest No Cont (07.23.22 @ 15:12) >    IMPRESSION: Few small patchy opacities in both upper lobes are of   uncertain etiology.    < end of copied text >  < from: Xray Chest 1 View-PORTABLE IMMEDIATE (07.20.22 @ 21:38) >    IMPRESSION:   RIGHT infrahilar focal airspace disease versus overlapping   vessels. Continued surveillance recommended..    < end of copied text >      Consultant(s) Notes Reviewed:   YES/ No    Care Discussed with Consultants :     Plan of care was discussed with patient and /or primary care giver; all questions and concerns were addressed and care was aligned with patient's wishes.

## 2022-07-25 NOTE — DISCHARGE NOTE NURSING/CASE MANAGEMENT/SOCIAL WORK - PATIENT PORTAL LINK FT
You can access the FollowMyHealth Patient Portal offered by [facility] by registering at the following website: [email or web address]. By joining [facility]’s FollowMyHealth portal, you will also be able to view your health information using other applications (apps) compatible with our system.

## 2022-07-25 NOTE — PROGRESS NOTE ADULT - ASSESSMENT
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 Admitted due to Parainfluenza. CXR shows RIGHT infrahilar focal airspace disease versus overlapping vessels. Pending CTA chest to r/o thoracic aortic dissection. Patient combative during attempt to insert 20g for CT. Provider attempt. CT notified. Repeat EKG  shows T wave inversion on V4, 5, and 6 otherwise NSR.

## 2022-07-25 NOTE — DISCHARGE NOTE PROVIDER - ATTENDING DISCHARGE PHYSICAL EXAMINATION:
PHYSICAL EXAM:  GENERAL: NAD, well-developed, on room air  HEAD:  Atraumatic, Normocephalic  EYES: conjunctiva and sclera clear  NECK: Supple, No JVD  CHEST/LUNG: Clear to auscultation bilaterally; No wheeze, rhonchi, rales  HEART: Regular rate and rhythm; No murmurs, rubs, or gallops  ABDOMEN: Soft, Nontender, Nondistended; Bowel sounds present  EXTREMITIES:  2+ Peripheral Pulses, No clubbing, cyanosis, or edema  PSYCH: calm, cooperative  SKIN: No rashes or lesions

## 2022-07-25 NOTE — DISCHARGE NOTE PROVIDER - NSDCQMSTAIRS_GEN_ALL_CORE
[FreeTextEntry1] : Shayy Juárez presented to the office today for a cardiovascular evaluation. She was last seen in the office 1 year ago.\par \par She is now 59 years old with a history of hypertension since her 20s, and a history of hyperlipidemia. She is not known to have any underlying structural heart disease. \par \par When she was seen in the office, she was feeling well.  \par \par Shayy presents to the office today feeling well. She reports no chest discomfort or dyspnea with activity that would suggest angina. She continues to run and perform other forms of physical activity without limitation.   She reports no orthopnea, PND or lower extremity edema. She experiencing some palpitations recently, which she describes as a flutter, which happens very weak. It is more pronounced when she is at rest, especially lying down in bed. It never happens with exercise. He tends to be a momentary sensation, and she never has any sustained symptoms.
Yes

## 2022-07-25 NOTE — DISCHARGE NOTE PROVIDER - HOSPITAL COURSE
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 Pt admitted due to Parainfluenza . CXR shows right infrahilar focal airspace disease versus overlapping vessels . CT chest with IV contrast to evaluate widened mediastinum recommended. However , daughter did not consent for IV contrast. CT chest shows few only patchy opacities in lung. Continue Cefuroxime and Azithromycin thru July 27th for RLL Pneumonia 2/2 Parainfluenza infection, concern for bacterial PNA. Type II MI due to demand ischemia due to parainfluenza infection. EKG shows T wave inversion on V4, 5, 6, otherwise NSR> Trop downtrending. Continue Eliquis. BP stable with current regimen. Continue reduced metoprolol 12. 5mg po BID, atorvastatin.     Pt is medically optimized for discharge. Continue medications as instructed. Follow-up with PCP. Discussed with daughter Maryan Willingham with  # [phone]. all questions and concerns addressed.   This is brief summary of patient's hospitalization. Refer to medical record for complete details of hospital course.   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 Pt admitted due to Parainfluenza . CXR shows right infrahilar focal airspace disease versus overlapping vessels . CT chest with IV contrast to evaluate widened mediastinum recommended. However , daughter did not consent for IV contrast. CT chest shows few only patchy opacities in lung. Continue Cefuroxime and Azithromycin thru July 28th for RLL Pneumonia 2/2 Parainfluenza infection, concern for bacterial PNA. Pulmonology consulted and recommended repeat CXR. Patient's respiratory status has improved and has remained on room air.    Discharge discussed with attending and given clinical course decision made to discharge patient. Spoke with tim Steinberg regarding discharge all questions and concerns addressed.  This is just a brief hospital course for full course please refer to daily progress and consult notes.   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 Pt admitted due to Parainfluenza . CXR shows right infrahilar focal airspace disease versus overlapping vessels . CT chest with IV contrast to evaluate widened mediastinum recommended. However , daughter did not consent for IV contrast. CT chest shows few only patchy opacities in lung. Continue Cefuroxime and Azithromycin thru July 28th for RLL Pneumonia 2/2 Parainfluenza infection, concern for supraimposed bacterial PNA. Pulmonology consulted and recommended repeat CXR. Patient's respiratory status has improved and has remained on room air.    Discharge discussed with attending and given clinical course decision made to discharge patient. Spoke with daughter Maryan regarding discharge all questions and concerns addressed.  This is just a brief hospital course for full course please refer to daily progress and consult notes.

## 2022-07-25 NOTE — DISCHARGE NOTE PROVIDER - CARE PROVIDERS DIRECT ADDRESSES
,DirectAddress_Unknown ,DirectAddress_Unknown,[email or web address]

## 2022-07-25 NOTE — PROGRESS NOTE ADULT - PROBLEM SELECTOR PLAN 1
CT chest shows Few small patchy opacities in both upper lobes are of uncertain etiology.  CXR shows Bilateral lower lobe interstitial infiltrates/areas of platelike atelectasis, right greater than left, increased bilaterally  Continue Azithromycin, Cefuroxime thru July 27th  Continue bronchodilator, antitussive  Monitor oxygenation. Tolerating room air, maintaining SpO2 >/= 94%   Aspiration precautions  Incentive spirometry

## 2022-07-26 LAB
CULTURE RESULTS: SIGNIFICANT CHANGE UP
CULTURE RESULTS: SIGNIFICANT CHANGE UP
HCT VFR BLD CALC: 44.7 % — SIGNIFICANT CHANGE UP (ref 34.5–45)
HGB BLD-MCNC: 14.6 G/DL — SIGNIFICANT CHANGE UP (ref 11.5–15.5)
MCHC RBC-ENTMCNC: 31.8 PG — SIGNIFICANT CHANGE UP (ref 27–34)
MCHC RBC-ENTMCNC: 32.7 GM/DL — SIGNIFICANT CHANGE UP (ref 32–36)
MCV RBC AUTO: 97.4 FL — SIGNIFICANT CHANGE UP (ref 80–100)
NRBC # BLD: 0 /100 WBCS — SIGNIFICANT CHANGE UP (ref 0–0)
PLATELET # BLD AUTO: 207 K/UL — SIGNIFICANT CHANGE UP (ref 150–400)
RBC # BLD: 4.59 M/UL — SIGNIFICANT CHANGE UP (ref 3.8–5.2)
RBC # FLD: 12.2 % — SIGNIFICANT CHANGE UP (ref 10.3–14.5)
SPECIMEN SOURCE: SIGNIFICANT CHANGE UP
SPECIMEN SOURCE: SIGNIFICANT CHANGE UP
WBC # BLD: 4.18 K/UL — SIGNIFICANT CHANGE UP (ref 3.8–10.5)
WBC # FLD AUTO: 4.18 K/UL — SIGNIFICANT CHANGE UP (ref 3.8–10.5)

## 2022-07-26 PROCEDURE — 99232 SBSQ HOSP IP/OBS MODERATE 35: CPT

## 2022-07-26 PROCEDURE — 99222 1ST HOSP IP/OBS MODERATE 55: CPT

## 2022-07-26 RX ADMIN — ALBUTEROL 2 PUFF(S): 90 AEROSOL, METERED ORAL at 15:43

## 2022-07-26 RX ADMIN — ATORVASTATIN CALCIUM 20 MILLIGRAM(S): 80 TABLET, FILM COATED ORAL at 22:39

## 2022-07-26 RX ADMIN — APIXABAN 5 MILLIGRAM(S): 2.5 TABLET, FILM COATED ORAL at 17:36

## 2022-07-26 RX ADMIN — Medication 250 MILLIGRAM(S): at 06:25

## 2022-07-26 RX ADMIN — ALBUTEROL 2 PUFF(S): 90 AEROSOL, METERED ORAL at 22:39

## 2022-07-26 RX ADMIN — Medication 200 MILLIGRAM(S): at 17:58

## 2022-07-26 RX ADMIN — APIXABAN 5 MILLIGRAM(S): 2.5 TABLET, FILM COATED ORAL at 06:25

## 2022-07-26 RX ADMIN — Medication 12.5 MILLIGRAM(S): at 17:36

## 2022-07-26 RX ADMIN — PANTOPRAZOLE SODIUM 40 MILLIGRAM(S): 20 TABLET, DELAYED RELEASE ORAL at 06:25

## 2022-07-26 RX ADMIN — ONDANSETRON 4 MILLIGRAM(S): 8 TABLET, FILM COATED ORAL at 17:56

## 2022-07-26 RX ADMIN — ALBUTEROL 2 PUFF(S): 90 AEROSOL, METERED ORAL at 09:11

## 2022-07-26 RX ADMIN — AZITHROMYCIN 500 MILLIGRAM(S): 500 TABLET, FILM COATED ORAL at 12:29

## 2022-07-26 RX ADMIN — Medication 250 MILLIGRAM(S): at 17:37

## 2022-07-26 RX ADMIN — SERTRALINE 25 MILLIGRAM(S): 25 TABLET, FILM COATED ORAL at 12:29

## 2022-07-26 NOTE — PROGRESS NOTE ADULT - PROBLEM SELECTOR PLAN 4
- continue Metoprolol
Pmh of htn  need to confirm home meds  continue metoprolol for now  monitor bp  adjust meds as needed
Pmh of htn  need to confirm home meds  continue metoprolol for now  monitor bp  adjust meds as needed
Continue Metoprolol 12.5mg po BID

## 2022-07-26 NOTE — PROGRESS NOTE ADULT - PROBLEM SELECTOR PLAN 5
f/u lipid profile  atorvastatin (need to confirm home meds)
EKG shows NSR. Twave inversion in V4, 5, 6.   Trop downtrending  Continue eliquis, metoprolol, statin
- DVT ppx: Pt on eliquis for iliac vein thrombosis  - GI ppx: PPI
f/u lipid profile  atorvastatin (need to confirm home meds)

## 2022-07-26 NOTE — DIETITIAN INITIAL EVALUATION ADULT - PERTINENT MEDS FT
MEDICATIONS  (STANDING):  ALBUTerol    90 MICROgram(s) HFA Inhaler 2 Puff(s) Inhalation every 6 hours  apixaban 5 milliGRAM(s) Oral every 12 hours  atorvastatin 20 milliGRAM(s) Oral at bedtime  azithromycin   Tablet 500 milliGRAM(s) Oral daily  cefuroxime   Tablet 250 milliGRAM(s) Oral every 12 hours  metoprolol tartrate 12.5 milliGRAM(s) Oral two times a day  pantoprazole    Tablet 40 milliGRAM(s) Oral before breakfast  sertraline 25 milliGRAM(s) Oral daily  sodium chloride 0.9%. 1000 milliLiter(s) (60 mL/Hr) IV Continuous <Continuous>    MEDICATIONS  (PRN):  acetaminophen     Tablet .. 650 milliGRAM(s) Oral every 6 hours PRN Temp greater or equal to 38C (100.4F), Mild Pain (1 - 3)  guaiFENesin Oral Liquid (Sugar-Free) 200 milliGRAM(s) Oral every 6 hours PRN Cough  ondansetron   Disintegrating Tablet 4 milliGRAM(s) Oral every 8 hours PRN Nausea and/or Vomiting

## 2022-07-26 NOTE — PROGRESS NOTE ADULT - PROBLEM SELECTOR PROBLEM 1
Multifocal pneumonia
Systemic inflammatory response syndrome (SIRS)
Systemic inflammatory response syndrome (SIRS)
Multifocal pneumonia

## 2022-07-26 NOTE — PROGRESS NOTE ADULT - ASSESSMENT
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 Admitted to medicine for Parainfluenza. CXR shows right infrahilar focal airspace disease versus overlapping vessels. CT chest shows opacities b/l upper lobes, started on Ceftin for pneumonia, repeat cxr shows worsening infiltrates. Pulm Dr. Evans consulted.

## 2022-07-26 NOTE — CONSULT NOTE ADULT - ASSESSMENT
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 Patient is AOx1-2, poor historian, called daughter for more collateral.  Admitted due to Parainfluenza. CXR shows RIGHT infrahilar focal airspace disease vs overlapping vessels. CT chest shows few only patchy opacities in lung, unable to get CTA as daughter refused to consent to contrast. Initially started on abx with azithromycin and Rocephin x3d, later switched to Cefuroxime and Azithromycin for an additional 5d for RLL Pneumonia 2/2 Parainfluenza infection, concern for superimposed bacterial PNA.   DC cancelled for worsening cough and b/l opacities on CXR.  Afebrile with no leukocytosis.   CXR b/l LL interstitial infiltrates c/w atelectasis, R>L, increased b/l   Incentive Spirometer provided at the bedside.   Per daughter, no h/o asthma, allergies, lung problems, and does ot follow o/p pulmonologist.          Recommendations:   Encourage reorientation and Incentive Spirometer use at the bedside, patient is confused at baseline.  Start chest PT, patient may benefit from chest PT as she has poor expectoration   C/w Cefuroxime and Azithromycin until July 27th      Thank you for the consult, will continue to follow while inpatient.        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 Patient is AOx1-2, poor historian, called daughter for more collateral.  Admitted due to Parainfluenza. CXR shows RIGHT infrahilar focal airspace disease vs overlapping vessels. CT chest shows few only patchy opacities in lung, unable to get CTA as daughter refused to consent to contrast. Initially started on abx with azithromycin and Rocephin x3d, later switched to Cefuroxime and Azithromycin for an additional 5d for RLL Pneumonia 2/2 Parainfluenza infection, concern for superimposed bacterial PNA.   DC cancelled for worsening cough and b/l opacities on CXR.  Afebrile with no leukocytosis.   CXR b/l LL interstitial infiltrates c/w atelectasis, R>L, increased b/l   Incentive Spirometer provided at the bedside.   Per daughter, no h/o asthma, allergies, lung problems, and does ot follow o/p pulmonologist.      Based on labs and imaging patient symptoms c/w Post- Viral PNA, cough will continue to be chronic and may take up to 3-months to clear as patient is recovering from parainfluenza.     Recommendations:   C/w Cefuroxime and Azithromycin until July 28th, to complete a 7-day course  Get a repeat CXR in the AM to r/o superimposed PNA  Encourage reorientation and Incentive Spirometer use at the bedside, patient is confused at baseline.  Start chest PT, patient may benefit from chest PT as she has poor expectoration   c/w supportive care for her cough      Thank you for the consult, will continue to follow while inpatient.

## 2022-07-26 NOTE — CONSULT NOTE ADULT - ATTENDING COMMENTS
Agree with above.  Parainfluenza pneumonitis  possible aspiration  No evidence of bacterial PNA    Will complete 7d course abx on 7/28  No leukocytosis  afebrile  CT imaging and CXR reviewed    Recommend:  conservative management  complete 7d Ceftin/azithro as above  Pulmonary toilet with albuterolMDI/Spacer  Chest PT  Aspiration precautions  Consider Speech and Swallow eval  OOBTC/PT as tolerated  Supplemental O2 for >92%    Thank you for this consult, will follow with you

## 2022-07-26 NOTE — PROGRESS NOTE ADULT - PROBLEM SELECTOR PLAN 1
- CT chest shows Few small patchy opacities in both upper lobes are of uncertain etiology.  - CXR shows Bilateral lower lobe interstitial infiltrates/areas of platelike atelectasis, right greater than left, increased bilaterally  - Continue Azithromycin, Cefuroxime thru July 27th  - Continue bronchodilator, antitussive  - monitor oxygenation, tolerating room air  - Aspiration precautions  - Incentive spirometry

## 2022-07-26 NOTE — PROGRESS NOTE ADULT - NS ATTEND AMEND GEN_ALL_CORE FT
Patient seen and examined on 7/26/22. This is a late entry. Spoke with patient in Urdu.     #RLL pneumonia due to Parainfluenza infection, concern for secondary bacterial pneumonia  #Wide mediastinum  #Type II MI due to Demand ischemia due to Parainfluenza infection  #H/O CVA with residual right sided weakness  #HTN  #HLD  #Iliac vein thrombosis on Eliquis   #Dementia    Plan:   Patient had CXR showwing concern for worsening atelectasis vs worsening PNA, concern for supraimposed secondary bacterial infection  Appreciate pulm consult, continue cefuroxime and azithromycin through 7/28  Repeat CXR in AM  Continue incentive spirometry  Cont Albuterol inhaler  Trop trended down   CT chest non contrast did not show vascular abnormality  Cont Eliquis   BP stable with current regimen   Pureed diet  Cont home med Zoloft  Possible dc tomorrow if respiratory status stable.
Patient was seen and examined at bedside    [id number]  Reports chest pain with coughing  No other complains   AAOx1    ICU Vital Signs Last 24 Hrs  T(C): 36.6 (22 Jul 2022 18:23), Max: 37.3 (21 Jul 2022 20:50)  T(F): 97.8 (22 Jul 2022 18:23), Max: 99.2 (21 Jul 2022 20:50)  HR: 71 (22 Jul 2022 18:23) (62 - 71)  BP: 141/59 (22 Jul 2022 18:23) (134/96 - 149/64)  BP(mean): --  ABP: --  ABP(mean): --  RR: 18 (22 Jul 2022 18:23) (16 - 18)  SpO2: 96% (22 Jul 2022 18:23) (94% - 97%)    O2 Parameters below as of 22 Jul 2022 18:23  Patient On (Oxygen Delivery Method): room air      P/E:  NAD  AAOx1, residual right sided weakness   Mild wheezing BL  S1S2 WNL, no MRG  Abd soft, non tender, BS present   BL LE no edema or calf tenderness    Labs noted     A/P:  Will cont albuterol  Ceftriaxone and Azithromycin   No active chest pain now, due to wide mediastinum on CXR, with mild trop and chest pain, will obtain CTA chest. Could not obtain CTA due to IV line issues. IV line obtained. Will follow up CTA.   Rest of the management as above
Patient was seen and examined at bedside.  used [id number]  Complains of worsening cough and chest pain     ICU Vital Signs Last 24 Hrs  T(C): 36.8 (25 Jul 2022 12:30), Max: 37.3 (24 Jul 2022 20:53)  T(F): 98.2 (25 Jul 2022 12:30), Max: 99.1 (24 Jul 2022 20:53)  HR: 70 (25 Jul 2022 12:30) (59 - 70)  BP: 131/64 (25 Jul 2022 12:30) (126/55 - 143/63)  BP(mean): --  ABP: --  ABP(mean): --  RR: 17 (25 Jul 2022 12:30) (16 - 17)  SpO2: 94% (25 Jul 2022 12:30) (94% - 96%)    O2 Parameters below as of 25 Jul 2022 12:30  Patient On (Oxygen Delivery Method): room air        P/E:  NAD  AAOx1, right sided residual weakness   BL basal crepitations   S1 S2 WNL, no MRG   Abd soft, non tender, BS present   BL LE no edema or calf tenderness     labs noted    A/P:  RLL pneumonia due to Parainfluenza infection, concern for secondary bacterial pneumonia  Wide mediastinum  Type II MI due to Demand ischemia due to Parainfluenza infection  H/O CVA with residual right sided weakness  HTN  HLD  Iliac vein thrombosis on Eliquis   Dementia    Plan:   patient had worsening cough today, CXR showed worsening BL opacities   Could be due to worsening atelactasis, repeat CBC normal WBC count  Discharge cancelled to monitor   Started incentive spirometry  Will cont Albuterol   Cont Ceftin and Azithromycin for 5days   Trop trended down   CT chest non contrast did not show vascular abnormality  Cont Eliquis   BP stable with current regimen   Pureed diet  Patient looks emotionally labile today, will cont home med Zoloft  Possible dc tomorrow if respiratory status stable

## 2022-07-26 NOTE — PROGRESS NOTE ADULT - PROBLEM SELECTOR PROBLEM 6
Cerebrovascular accident (CVA)
Discharge planning issues
Cerebrovascular accident (CVA)
Cerebrovascular accident (CVA)

## 2022-07-26 NOTE — DIETITIAN INITIAL EVALUATION ADULT - OTHER INFO
Pt visited. Pt seen for LOS. Pt asleep.  D/W RN Pt is A & O x1. Pt is English speaking. D/W  Daughter ( cleopatra) @ [phone]. Per daughter At  Home pt was eating Pureed Diet W Thin ( Regular) water. Per Daughter Pt need assistance  w feeding D/T R  Hand weakness. Pt w H/O CVA. Daughter Reports HT 5 feet 8 inches. -170 Lb. Bed scale 173 LB.  Weight stable per Daughter. NKFA reported. Pt states  Pt is not walking  at  Home. ( H/O CVA)

## 2022-07-26 NOTE — PROGRESS NOTE ADULT - SUBJECTIVE AND OBJECTIVE BOX
NP Note discussed with  Primary Attending    Patient is a 76y old  Female who presents with a chief complaint of Other specified abnormalities of plasma proteins     (26 Jul 2022 12:27)      INTERVAL HPI/OVERNIGHT EVENTS: no new complaints    MEDICATIONS  (STANDING):  ALBUTerol    90 MICROgram(s) HFA Inhaler 2 Puff(s) Inhalation every 6 hours  apixaban 5 milliGRAM(s) Oral every 12 hours  atorvastatin 20 milliGRAM(s) Oral at bedtime  azithromycin   Tablet 500 milliGRAM(s) Oral daily  cefuroxime   Tablet 250 milliGRAM(s) Oral every 12 hours  metoprolol tartrate 12.5 milliGRAM(s) Oral two times a day  pantoprazole    Tablet 40 milliGRAM(s) Oral before breakfast  sertraline 25 milliGRAM(s) Oral daily  sodium chloride 0.9%. 1000 milliLiter(s) (60 mL/Hr) IV Continuous <Continuous>    MEDICATIONS  (PRN):  acetaminophen     Tablet .. 650 milliGRAM(s) Oral every 6 hours PRN Temp greater or equal to 38C (100.4F), Mild Pain (1 - 3)  guaiFENesin Oral Liquid (Sugar-Free) 200 milliGRAM(s) Oral every 6 hours PRN Cough  ondansetron   Disintegrating Tablet 4 milliGRAM(s) Oral every 8 hours PRN Nausea and/or Vomiting      __________________________________________________  REVIEW OF SYSTEMS:    CONSTITUTIONAL: No fever,   EYES: no acute visual disturbances  NECK: No pain or stiffness  RESPIRATORY: No cough; No shortness of breath  CARDIOVASCULAR: No chest pain, no palpitations  GASTROINTESTINAL: No pain. No nausea or vomiting; No diarrhea   NEUROLOGICAL: No headache or numbness, no tremors  MUSCULOSKELETAL: No joint pain, no muscle pain  GENITOURINARY: no dysuria, no frequency, no hesitancy  PSYCHIATRY: no depression , no anxiety  ALL OTHER  ROS negative        Vital Signs Last 24 Hrs  T(C): 36.9 (26 Jul 2022 13:52), Max: 37 (26 Jul 2022 06:07)  T(F): 98.5 (26 Jul 2022 13:52), Max: 98.6 (26 Jul 2022 06:07)  HR: 66 (26 Jul 2022 13:52) (66 - 88)  BP: 125/65 (26 Jul 2022 13:52) (110/90 - 130/60)  BP(mean): --  RR: 18 (26 Jul 2022 13:52) (18 - 18)  SpO2: 95% (26 Jul 2022 13:52) (94% - 99%)    Parameters below as of 26 Jul 2022 13:52  Patient On (Oxygen Delivery Method): room air        ________________________________________________  PHYSICAL EXAM:  GENERAL: NAD  HEENT: Normocephalic;  conjunctivae and sclerae clear; moist mucous membranes;   NECK : supple  CHEST/LUNG: Clear to auscultation bilaterally with good air entry   HEART: S1 S2  regular; no murmurs, gallops or rubs  ABDOMEN: Soft, Nontender, Nondistended; Bowel sounds present  EXTREMITIES: no cyanosis; no edema; no calf tenderness  SKIN: warm and dry; no rash  NERVOUS SYSTEM:  Awake and alert; Oriented  to place, person and time ; no new deficits    _________________________________________________  LABS:                        14.6   4.18  )-----------( 207      ( 26 Jul 2022 07:40 )             44.7               CAPILLARY BLOOD GLUCOSE            RADIOLOGY & ADDITIONAL TESTS:    Imaging Personally Reviewed:  YES/NO    Consultant(s) Notes Reviewed:   YES/ No    Care Discussed with Consultants :     Plan of care was discussed with patient and /or primary care giver; all questions and concerns were addressed and care was aligned with patient's wishes.     NP Note discussed with  Primary Attending    Patient is a 76y old  Female who presents with a chief complaint of Other specified abnormalities of plasma proteins     (26 Jul 2022 12:27)      INTERVAL HPI/OVERNIGHT EVENTS: remains on room air    MEDICATIONS  (STANDING):  ALBUTerol    90 MICROgram(s) HFA Inhaler 2 Puff(s) Inhalation every 6 hours  apixaban 5 milliGRAM(s) Oral every 12 hours  atorvastatin 20 milliGRAM(s) Oral at bedtime  azithromycin   Tablet 500 milliGRAM(s) Oral daily  cefuroxime   Tablet 250 milliGRAM(s) Oral every 12 hours  metoprolol tartrate 12.5 milliGRAM(s) Oral two times a day  pantoprazole    Tablet 40 milliGRAM(s) Oral before breakfast  sertraline 25 milliGRAM(s) Oral daily  sodium chloride 0.9%. 1000 milliLiter(s) (60 mL/Hr) IV Continuous <Continuous>    MEDICATIONS  (PRN):  acetaminophen     Tablet .. 650 milliGRAM(s) Oral every 6 hours PRN Temp greater or equal to 38C (100.4F), Mild Pain (1 - 3)  guaiFENesin Oral Liquid (Sugar-Free) 200 milliGRAM(s) Oral every 6 hours PRN Cough  ondansetron   Disintegrating Tablet 4 milliGRAM(s) Oral every 8 hours PRN Nausea and/or Vomiting      __________________________________________________  REVIEW OF SYSTEMS:    limited due to patient's mental status    Vital Signs Last 24 Hrs  T(C): 36.9 (26 Jul 2022 13:52), Max: 37 (26 Jul 2022 06:07)  T(F): 98.5 (26 Jul 2022 13:52), Max: 98.6 (26 Jul 2022 06:07)  HR: 66 (26 Jul 2022 13:52) (66 - 88)  BP: 125/65 (26 Jul 2022 13:52) (110/90 - 130/60)  BP(mean): --  RR: 18 (26 Jul 2022 13:52) (18 - 18)  SpO2: 95% (26 Jul 2022 13:52) (94% - 99%)    Parameters below as of 26 Jul 2022 13:52  Patient On (Oxygen Delivery Method): room air        ________________________________________________  PHYSICAL EXAM:  GENERAL: NAD  HEENT: Normocephalic;  conjunctivae and sclerae clear  NECK : supple  CHEST/LUNG: Clear to auscultation bilaterally; +ronchi to RML  HEART: S1 S2  regular; no murmurs  ABDOMEN: Soft, Nontender, Nondistended; Bowel sounds present in all 4 quadrants  EXTREMITIES: no cyanosis; no edema; no calf tenderness  SKIN: warm and dry; no rash  NERVOUS SYSTEM:  Awake and alert; Oriented  to person    _________________________________________________  LABS:                        14.6   4.18  )-----------( 207      ( 26 Jul 2022 07:40 )             44.7               CAPILLARY BLOOD GLUCOSE            RADIOLOGY & ADDITIONAL TESTS:  < from: Xray Chest 1 View-PORTABLE IMMEDIATE (07.20.22 @ 21:38) >  IMPRESSION:   RIGHT infrahilar focal airspace disease versus overlapping   vessels. Continued surveillance recommended..    --- End of Report ---    < end of copied text >      < from: CT Chest No Cont (07.23.22 @ 15:12) >  IMPRESSION: Few small patchy opacities in both upper lobes are of   uncertain etiology.    --- End of Report ---    < end of copied text >    < from: Xray Chest 1 View-PORTABLE IMMEDIATE (Xray Chest 1 View-PORTABLE IMMEDIATE .) (07.25.22 @ 15:18) >  IMPRESSION:  Bilateral lower lobe interstitial infiltrates/areas of platelike   atelectasis, right greater than left, increased bilaterally    --- End of Report ---    < end of copied text >      Imaging Personally Reviewed:  YES    Consultant(s) Notes Reviewed:   YES    Care Discussed with Consultants : Pulmonology    Plan of care was discussed with patient and /or primary care giver; all questions and concerns were addressed and care was aligned with patient's wishes.

## 2022-07-26 NOTE — CONSULT NOTE ADULT - SUBJECTIVE AND OBJECTIVE BOX
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 Pt is AO x 1, pleasant, currently complaining of headache and chest pains. She is not fully aware of what is going on, history provided by daughter at bedside. Daughters states that her sxs started around saturday night with cough productive of yellow sputum, chest and nasal congestion, and fever. Since then she has been having fevers at home, feeling week and fatigued. Daughter also endorses 5 episodes of loose stool today, but good appetite, denies any abd pain or blood in the stool. Patient also endorses mild genital itchiness on urination. Patient denies any other complains.  (21 Jul 2022 00:44)      REVIEW OF SYSTEMS:  All additional ROS negative.    PAST MEDICAL & SURGICAL HISTORY:  H/O iliac vein thrombosis      HTN (hypertension)      Hypercholesterolemia      Cerebrovascular accident (CVA)      Status post TRESA-BSO          FAMILY HISTORY:  FH: diabetes mellitus (Father)    FH: prostate cancer (Father)        SOCIAL HISTORY:  Smoking Status: [ ] Current, [ ] Former, [ ] Never  Pack Years:    MEDICATIONS:  Pulmonary:  ALBUTerol    90 MICROgram(s) HFA Inhaler 2 Puff(s) Inhalation every 6 hours  guaiFENesin Oral Liquid (Sugar-Free) 200 milliGRAM(s) Oral every 6 hours PRN    Antimicrobials:  azithromycin   Tablet 500 milliGRAM(s) Oral daily  cefuroxime   Tablet 250 milliGRAM(s) Oral every 12 hours    Anticoagulants:  apixaban 5 milliGRAM(s) Oral every 12 hours    Onc:    GI/:  pantoprazole    Tablet 40 milliGRAM(s) Oral before breakfast    Endocrine:  atorvastatin 20 milliGRAM(s) Oral at bedtime    Cardiac:  metoprolol tartrate 12.5 milliGRAM(s) Oral two times a day    Other Medications:  acetaminophen     Tablet .. 650 milliGRAM(s) Oral every 6 hours PRN  ondansetron   Disintegrating Tablet 4 milliGRAM(s) Oral every 8 hours PRN  sertraline 25 milliGRAM(s) Oral daily  sodium chloride 0.9%. 1000 milliLiter(s) IV Continuous <Continuous>      Allergies    No Known Allergies    Intolerances        Vital Signs Last 24 Hrs  T(C): 36.9 (26 Jul 2022 13:52), Max: 37 (26 Jul 2022 06:07)  T(F): 98.5 (26 Jul 2022 13:52), Max: 98.6 (26 Jul 2022 06:07)  HR: 66 (26 Jul 2022 13:52) (66 - 88)  BP: 125/65 (26 Jul 2022 13:52) (110/90 - 130/60)  BP(mean): --  RR: 18 (26 Jul 2022 13:52) (18 - 18)  SpO2: 95% (26 Jul 2022 13:52) (94% - 99%)    Parameters below as of 26 Jul 2022 13:52  Patient On (Oxygen Delivery Method): room air        07-25 @ 07:01  -  07-26 @ 07:00  --------------------------------------------------------  IN: 0 mL / OUT: 600 mL / NET: -600 mL          PHYSICAL EXAM:  Constitutional: WDWN  Head: NC/AT  EENT: PERRL, anicteric sclera; oropharynx clear, MMM  Neck: supple, no appreciable JVD  Respiratory: (+) weak inspiratory effort, improved with encouragement and re-orientation (+) rhonchi right >left upon deep inspiration   Cardiovascular: +S1/S2, RRR  Gastrointestinal: soft, NT/ND  Extremities: WWP; no edema, clubbing or cyanosis  Vascular: 2+ radial pulses B/L  Neurological: awake and alert; MAURICE    LABS:      CBC Full  -  ( 26 Jul 2022 07:40 )  WBC Count : 4.18 K/uL  RBC Count : 4.59 M/uL  Hemoglobin : 14.6 g/dL  Hematocrit : 44.7 %  Platelet Count - Automated : 207 K/uL  Mean Cell Volume : 97.4 fl  Mean Cell Hemoglobin : 31.8 pg  Mean Cell Hemoglobin Concentration : 32.7 gm/dL  Auto Neutrophil # : x  Auto Lymphocyte # : x  Auto Monocyte # : x  Auto Eosinophil # : x  Auto Basophil # : x  Auto Neutrophil % : x  Auto Lymphocyte % : x  Auto Monocyte % : x  Auto Eosinophil % : x  Auto Basophil % : x                            RADIOLOGY & ADDITIONAL STUDIES:  ___________________________________________________________  < from: Xray Chest 1 View-PORTABLE IMMEDIATE (07.20.22 @ 21:38) >  FINDINGS:  CATHETERS AND TUBES: None    PULMONARY: RIGHT infrahilar focal airspace disease versus overlapping   vessels. Continued surveillance recommended. Remaining lung parenchyma   clear..   No pneumothorax.    HEART/VASCULAR: The heart and mediastinum size and configuration are   within normal limits.    BONES: Visualized osseous structures are intact.    IMPRESSION:   RIGHT infrahilar focal airspace disease versus overlapping   vessels. Continued surveillance recommended.    ___________________________________________________________  < from: CT Chest No Cont (07.23.22 @ 15:12) >  CT scan of the chest was obtained without administration of intravenous   contrast.    Nohilar and or mediastinal adenopathy is noted.    Heart is enlarged in size. Calcification of the coronary arteries is   noted. No pericardial effusion is noted.    No endobronchial lesions are noted. Few small patchy opacities are noted   in both upper lobes. Calcified nodule is noted in the right upper lobe.   Minimal atelectasis is noted involving the posterior aspects of both   lower lobes. No pleural effusions are noted. Elevation of the right   hemidiaphragm is noted.    Below the diaphragm,visualized portions of the abdomen demonstrate 3.3   cm left renal cyst.    Degenerative changes of the spine are noted.    IMPRESSION: Few small patchy opacities in both upper lobes are of   uncertain etiology.    ___________________________________________________________  < from: Xray Chest 1 View-PORTABLE IMMEDIATE (Xray Chest 1 View-PORTABLE IMMEDIATE .) (07.25.22 @ 15:18) >  FINDINGS:  Normal cardiac silhouette and normal pulmonary vasculature with   no lobar consolidation, effusion, pneumothorax or acute osseous finding.    Bilateral lower lobe interstitial infiltrates/areas of platelike   atelectasis, right greater than left, increased.    IMPRESSION:  Bilateral lower lobe interstitial infiltrates/areas of platelike   atelectasis, right greater than left, increased bilaterally

## 2022-07-26 NOTE — PROGRESS NOTE ADULT - PROBLEM SELECTOR PLAN 6
Continue statin, eliquis  Safety measures
Residual R sided hemiplegia and facial droop  continue  home meds
- f/u Pulm reccs  - Dispo to home with HH services 24h/7d  - CM following
Residual R sided hemiplegia and facial droop  continue  home meds

## 2022-07-27 VITALS
RESPIRATION RATE: 17 BRPM | SYSTOLIC BLOOD PRESSURE: 133 MMHG | DIASTOLIC BLOOD PRESSURE: 61 MMHG | HEART RATE: 60 BPM | TEMPERATURE: 99 F | OXYGEN SATURATION: 94 %

## 2022-07-27 LAB
ANION GAP SERPL CALC-SCNC: 7 MMOL/L — SIGNIFICANT CHANGE UP (ref 5–17)
BUN SERPL-MCNC: 16 MG/DL — SIGNIFICANT CHANGE UP (ref 7–18)
CALCIUM SERPL-MCNC: 9.7 MG/DL — SIGNIFICANT CHANGE UP (ref 8.4–10.5)
CHLORIDE SERPL-SCNC: 108 MMOL/L — SIGNIFICANT CHANGE UP (ref 96–108)
CO2 SERPL-SCNC: 26 MMOL/L — SIGNIFICANT CHANGE UP (ref 22–31)
CREAT SERPL-MCNC: 0.79 MG/DL — SIGNIFICANT CHANGE UP (ref 0.5–1.3)
EGFR: 78 ML/MIN/1.73M2 — SIGNIFICANT CHANGE UP
GLUCOSE SERPL-MCNC: 85 MG/DL — SIGNIFICANT CHANGE UP (ref 70–99)
HCT VFR BLD CALC: 44.1 % — SIGNIFICANT CHANGE UP (ref 34.5–45)
HGB BLD-MCNC: 14.1 G/DL — SIGNIFICANT CHANGE UP (ref 11.5–15.5)
MCHC RBC-ENTMCNC: 31.1 PG — SIGNIFICANT CHANGE UP (ref 27–34)
MCHC RBC-ENTMCNC: 32 GM/DL — SIGNIFICANT CHANGE UP (ref 32–36)
MCV RBC AUTO: 97.1 FL — SIGNIFICANT CHANGE UP (ref 80–100)
NRBC # BLD: 0 /100 WBCS — SIGNIFICANT CHANGE UP (ref 0–0)
PLATELET # BLD AUTO: 214 K/UL — SIGNIFICANT CHANGE UP (ref 150–400)
POTASSIUM SERPL-MCNC: 4.1 MMOL/L — SIGNIFICANT CHANGE UP (ref 3.5–5.3)
POTASSIUM SERPL-SCNC: 4.1 MMOL/L — SIGNIFICANT CHANGE UP (ref 3.5–5.3)
RBC # BLD: 4.54 M/UL — SIGNIFICANT CHANGE UP (ref 3.8–5.2)
RBC # FLD: 12.3 % — SIGNIFICANT CHANGE UP (ref 10.3–14.5)
SODIUM SERPL-SCNC: 141 MMOL/L — SIGNIFICANT CHANGE UP (ref 135–145)
WBC # BLD: 4.15 K/UL — SIGNIFICANT CHANGE UP (ref 3.8–10.5)
WBC # FLD AUTO: 4.15 K/UL — SIGNIFICANT CHANGE UP (ref 3.8–10.5)

## 2022-07-27 PROCEDURE — 99239 HOSP IP/OBS DSCHRG MGMT >30: CPT

## 2022-07-27 PROCEDURE — 71045 X-RAY EXAM CHEST 1 VIEW: CPT | Mod: 26

## 2022-07-27 PROCEDURE — 99232 SBSQ HOSP IP/OBS MODERATE 35: CPT

## 2022-07-27 RX ORDER — AZITHROMYCIN 500 MG/1
1 TABLET, FILM COATED ORAL
Qty: 1 | Refills: 0
Start: 2022-07-27 | End: 2022-07-27

## 2022-07-27 RX ORDER — CEFUROXIME AXETIL 250 MG
1 TABLET ORAL
Qty: 2 | Refills: 0
Start: 2022-07-27 | End: 2022-07-27

## 2022-07-27 RX ADMIN — AZITHROMYCIN 500 MILLIGRAM(S): 500 TABLET, FILM COATED ORAL at 11:54

## 2022-07-27 RX ADMIN — PANTOPRAZOLE SODIUM 40 MILLIGRAM(S): 20 TABLET, DELAYED RELEASE ORAL at 05:14

## 2022-07-27 RX ADMIN — Medication 250 MILLIGRAM(S): at 05:14

## 2022-07-27 RX ADMIN — Medication 200 MILLIGRAM(S): at 05:23

## 2022-07-27 RX ADMIN — Medication 12.5 MILLIGRAM(S): at 05:14

## 2022-07-27 RX ADMIN — SERTRALINE 25 MILLIGRAM(S): 25 TABLET, FILM COATED ORAL at 11:54

## 2022-07-27 RX ADMIN — APIXABAN 5 MILLIGRAM(S): 2.5 TABLET, FILM COATED ORAL at 05:15

## 2022-07-27 NOTE — PROGRESS NOTE ADULT - ATTENDING COMMENTS
Agree with above.  Parainfluenza pneumonitis  possible aspiration  No evidence of bacterial PNA    Will complete 7d course abx on 7/28  No leukocytosis  afebrile  CT imaging and CXR reviewed    Recommend:  conservative management  complete 7d Ceftin/azithro as above  Pulmonary toilet with albuterol MDI/Spacer  Chest PT  Aspiration precautions  Consider Speech and Swallow eval  OOBTC/PT as tolerated  Supplemental O2 for >92%    Thank you for this consult, will follow with you

## 2022-07-27 NOTE — PROGRESS NOTE ADULT - ASSESSMENT
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 Patient is AOx1-2, poor historian, called daughter for more collateral.  Admitted due to Parainfluenza. CXR shows RIGHT infrahilar focal airspace disease vs overlapping vessels. CT chest shows few only patchy opacities in lung, unable to get CTA as daughter refused to consent to contrast. Initially started on abx with azithromycin and Rocephin x3d, later transitioned to PO for an additional 5d for RLL Pneumonia 2/2 Parainfluenza infection.   DC cancelled for worsening cough and b/l opacities on CXR.  Afebrile with no leukocytosis. Repeat CXR show clearance fo RLL opacity s/p chest PT c/w poor clearance   Incentive Spirometer provided at the bedside, however remains unused as patient has poor mentation at bedside.   Per daughter, no h/o asthma, allergies, lung problems, and does ot follow o/p pulmonologist.   SpO2 noted to have mild desaturation, improved with deep inspiration 93% on RA.       Patient initial presenting symptoms c/w Post- Viral PNA, cough will continue to be chronic and may take up to 3-months to clear as patient is recovering from parainfluenza.   Based on labs, imaging, and CVA hx, progression of symptoms in the absence of signs of infection suggest Aspiration Pneumonitis.      Recommendations:   C/w Cefuroxime and Azithromycin until July 28th, to complete a 7-day course  Consider S/S evaluation prior to DC   Aspiration precautions, as patient is high risk with CVA hx w/ poor lung clearance and expectoration.   OOBTC  Encourage reorientation and Incentive Spirometer use at the bedside, patient is confused at baseline.  Chest PT, patient may benefit from chest PT as she has poor expectoration   Start Albuterol MDI w/ SPACER   c/w supportive care for her cough      Thank you for the consult, will continue to follow while inpatient.

## 2022-07-27 NOTE — PROGRESS NOTE ADULT - SUBJECTIVE AND OBJECTIVE BOX
PULMONARY CONSULT SERVICE FOLLOW-UP NOTE    INTERVAL HPI:  Reviewed chart and overnight events; patient seen and examined at bedside.    MEDICATIONS:  Pulmonary:  ALBUTerol    90 MICROgram(s) HFA Inhaler 2 Puff(s) Inhalation every 6 hours  guaiFENesin Oral Liquid (Sugar-Free) 200 milliGRAM(s) Oral every 6 hours PRN    Antimicrobials:  azithromycin   Tablet 500 milliGRAM(s) Oral daily  cefuroxime   Tablet 250 milliGRAM(s) Oral every 12 hours    Anticoagulants:  apixaban 5 milliGRAM(s) Oral every 12 hours    Cardiac:  metoprolol tartrate 12.5 milliGRAM(s) Oral two times a day      Allergies    No Known Allergies    Intolerances        Vital Signs Last 24 Hrs  T(C): 37.1 (27 Jul 2022 12:23), Max: 37.1 (27 Jul 2022 12:23)  T(F): 98.7 (27 Jul 2022 12:23), Max: 98.7 (27 Jul 2022 12:23)  HR: 60 (27 Jul 2022 12:23) (57 - 71)  BP: 133/61 (27 Jul 2022 12:23) (124/69 - 134/70)  BP(mean): --  RR: 17 (27 Jul 2022 12:23) (17 - 18)  SpO2: 94% (27 Jul 2022 12:23) (94% - 98%)    Parameters below as of 27 Jul 2022 12:23  Patient On (Oxygen Delivery Method): room air        07-26 @ 07:01  -  07-27 @ 07:00  --------------------------------------------------------  IN: 0 mL / OUT: 1100 mL / NET: -1100 mL          PHYSICAL EXAM:  Constitutional: WDWN  HEENT: NC/AT; PERRL, anicteric sclera; MMM  Neck: supple  Cardiovascular: +S1/S2, RRR  Respiratory: (+) diffuse rhonchi b/l upon deep inspiration (+) weak expectoration    Gastrointestinal: soft, NT/ND  Extremities: WWP; no edema, clubbing or cyanosis  Vascular: 2+ radial pulses B/L  Neurological: awake and alert; MAURICE    LABS:      CBC Full  -  ( 27 Jul 2022 07:10 )  WBC Count : 4.15 K/uL  RBC Count : 4.54 M/uL  Hemoglobin : 14.1 g/dL  Hematocrit : 44.1 %  Platelet Count - Automated : 214 K/uL  Mean Cell Volume : 97.1 fl  Mean Cell Hemoglobin : 31.1 pg  Mean Cell Hemoglobin Concentration : 32.0 gm/dL  Auto Neutrophil # : x  Auto Lymphocyte # : x  Auto Monocyte # : x  Auto Eosinophil # : x  Auto Basophil # : x  Auto Neutrophil % : x  Auto Lymphocyte % : x  Auto Monocyte % : x  Auto Eosinophil % : x  Auto Basophil % : x    07-27    141  |  108  |  16  ----------------------------<  85  4.1   |  26  |  0.79    Ca    9.7      27 Jul 2022 07:10                  RADIOLOGY & ADDITIONAL STUDIES:  ___________________________________________________________  < from: Xray Chest 1 View-PORTABLE IMMEDIATE (07.20.22 @ 21:38) >  FINDINGS:  CATHETERS AND TUBES: None    PULMONARY: RIGHT infrahilar focal airspace disease versus overlapping   vessels. Continued surveillance recommended. Remaining lung parenchyma   clear..   No pneumothorax.    HEART/VASCULAR: The heart and mediastinum size and configuration are   within normal limits.    BONES: Visualized osseous structures are intact.    IMPRESSION:   RIGHT infrahilar focal airspace disease versus overlapping   vessels. Continued surveillance recommended.    ___________________________________________________________  < from: CT Chest No Cont (07.23.22 @ 15:12) >  CT scan of the chest was obtained without administration of intravenous   contrast.    Nohilar and or mediastinal adenopathy is noted.    Heart is enlarged in size. Calcification of the coronary arteries is   noted. No pericardial effusion is noted.    No endobronchial lesions are noted. Few small patchy opacities are noted   in both upper lobes. Calcified nodule is noted in the right upper lobe.   Minimal atelectasis is noted involving the posterior aspects of both   lower lobes. No pleural effusions are noted. Elevation of the right   hemidiaphragm is noted.    Below the diaphragm,visualized portions of the abdomen demonstrate 3.3   cm left renal cyst.    Degenerative changes of the spine are noted.    IMPRESSION: Few small patchy opacities in both upper lobes are of   uncertain etiology.    ___________________________________________________________  < from: Xray Chest 1 View-PORTABLE IMMEDIATE (Xray Chest 1 View-PORTABLE IMMEDIATE .) (07.25.22 @ 15:18) >  FINDINGS:  Normal cardiac silhouette and normal pulmonary vasculature with   no lobar consolidation, effusion, pneumothorax or acute osseous finding.    Bilateral lower lobe interstitial infiltrates/areas of platelike   atelectasis, right greater than left, increased.    IMPRESSION:  Bilateral lower lobe interstitial infiltrates/areas of platelike   atelectasis, right greater than left, increased bilaterally

## 2022-07-27 NOTE — PROGRESS NOTE ADULT - REASON FOR ADMISSION
Cough and Fever

## 2022-08-01 PROBLEM — E78.00 PURE HYPERCHOLESTEROLEMIA, UNSPECIFIED: Chronic | Status: ACTIVE | Noted: 2022-07-20

## 2022-08-01 PROBLEM — I10 ESSENTIAL (PRIMARY) HYPERTENSION: Chronic | Status: ACTIVE | Noted: 2022-07-20

## 2022-08-01 PROBLEM — I63.9 CEREBRAL INFARCTION, UNSPECIFIED: Chronic | Status: ACTIVE | Noted: 2022-07-20

## 2022-08-11 PROCEDURE — 87635 SARS-COV-2 COVID-19 AMP PRB: CPT

## 2022-08-11 PROCEDURE — 71250 CT THORAX DX C-: CPT

## 2022-08-11 PROCEDURE — 94640 AIRWAY INHALATION TREATMENT: CPT

## 2022-08-11 PROCEDURE — 87040 BLOOD CULTURE FOR BACTERIA: CPT

## 2022-08-11 PROCEDURE — 87086 URINE CULTURE/COLONY COUNT: CPT

## 2022-08-11 PROCEDURE — 96365 THER/PROPH/DIAG IV INF INIT: CPT

## 2022-08-11 PROCEDURE — 80048 BASIC METABOLIC PNL TOTAL CA: CPT

## 2022-08-11 PROCEDURE — 81001 URINALYSIS AUTO W/SCOPE: CPT

## 2022-08-11 PROCEDURE — 80061 LIPID PANEL: CPT

## 2022-08-11 PROCEDURE — 0225U NFCT DS DNA&RNA 21 SARSCOV2: CPT

## 2022-08-11 PROCEDURE — 71045 X-RAY EXAM CHEST 1 VIEW: CPT

## 2022-08-11 PROCEDURE — 83735 ASSAY OF MAGNESIUM: CPT

## 2022-08-11 PROCEDURE — 83880 ASSAY OF NATRIURETIC PEPTIDE: CPT

## 2022-08-11 PROCEDURE — 86703 HIV-1/HIV-2 1 RESULT ANTBDY: CPT

## 2022-08-11 PROCEDURE — 82962 GLUCOSE BLOOD TEST: CPT

## 2022-08-11 PROCEDURE — 85025 COMPLETE CBC W/AUTO DIFF WBC: CPT

## 2022-08-11 PROCEDURE — 36415 COLL VENOUS BLD VENIPUNCTURE: CPT

## 2022-08-11 PROCEDURE — 84484 ASSAY OF TROPONIN QUANT: CPT

## 2022-08-11 PROCEDURE — 99285 EMERGENCY DEPT VISIT HI MDM: CPT | Mod: 25

## 2022-08-11 PROCEDURE — 93005 ELECTROCARDIOGRAM TRACING: CPT

## 2022-08-11 PROCEDURE — 87641 MR-STAPH DNA AMP PROBE: CPT

## 2022-08-11 PROCEDURE — 83605 ASSAY OF LACTIC ACID: CPT

## 2022-08-11 PROCEDURE — 85027 COMPLETE CBC AUTOMATED: CPT

## 2022-08-11 PROCEDURE — 96368 THER/DIAG CONCURRENT INF: CPT

## 2022-08-11 PROCEDURE — 96366 THER/PROPH/DIAG IV INF ADDON: CPT

## 2022-08-11 PROCEDURE — 84100 ASSAY OF PHOSPHORUS: CPT

## 2022-08-11 PROCEDURE — 87640 STAPH A DNA AMP PROBE: CPT

## 2022-08-11 PROCEDURE — 80053 COMPREHEN METABOLIC PANEL: CPT

## 2022-08-15 PROBLEM — Z00.00 ENCOUNTER FOR PREVENTIVE HEALTH EXAMINATION: Status: ACTIVE | Noted: 2022-08-15

## 2022-08-16 ENCOUNTER — APPOINTMENT (OUTPATIENT)
Dept: PULMONOLOGY | Facility: CLINIC | Age: 77
End: 2022-08-16

## 2022-08-16 VITALS
DIASTOLIC BLOOD PRESSURE: 78 MMHG | BODY MASS INDEX: 28.93 KG/M2 | SYSTOLIC BLOOD PRESSURE: 130 MMHG | TEMPERATURE: 98.1 F | HEIGHT: 66 IN | HEART RATE: 57 BPM | WEIGHT: 180 LBS | OXYGEN SATURATION: 97 %

## 2022-08-16 DIAGNOSIS — I82.409 ACUTE EMBOLISM AND THROMBOSIS OF UNSPECIFIED DEEP VEINS OF UNSPECIFIED LOWER EXTREMITY: ICD-10-CM

## 2022-08-16 DIAGNOSIS — J18.9 PNEUMONIA, UNSPECIFIED ORGANISM: ICD-10-CM

## 2022-08-16 PROCEDURE — 99204 OFFICE O/P NEW MOD 45 MIN: CPT

## 2022-10-17 PROBLEM — I82.409 DVT (DEEP VENOUS THROMBOSIS): Status: ACTIVE | Noted: 2022-10-17

## 2022-10-17 PROBLEM — J18.9 COMMUNITY ACQUIRED PNEUMONIA: Status: ACTIVE | Noted: 2022-10-17

## 2022-10-17 NOTE — ASSESSMENT
[FreeTextEntry1] : s/p hospitalization for PNA\par clinically recovered to baseline.\par no cough, no fever, no pulmonary complaints\par on eliquis for dvt\par \par rtc prn

## 2022-10-17 NOTE — PHYSICAL EXAM
[No Acute Distress] : no acute distress [Well Nourished] : well nourished [Well Groomed] : well groomed [Well Developed] : well developed [Normal Oropharynx] : normal oropharynx [I] : Mallampati Class: I [Normal Appearance] : normal appearance [No Neck Mass] : no neck mass [No JVD] : no jvd [Normal Rate/Rhythm] : normal rate/rhythm [Normal S1, S2] : normal s1, s2 [No Murmurs] : no murmurs [No Resp Distress] : no resp distress [No Acc Muscle Use] : no acc muscle use [Clear to Auscultation Bilaterally] : clear to auscultation bilaterally [No Abnormalities] : no abnormalities [Kyphosis] : kyphosis [Benign] : benign [No Clubbing] : no clubbing [No Cyanosis] : no cyanosis [No Edema] : no edema [Normal Color/ Pigmentation] : normal color/ pigmentation [Normal Affect] : normal affect [TextBox_68] : poor inspiratory effort [TextBox_99] : R hemiplegia, wheelchair bound [TextBox_132] : R facial droop. R hemiparesis RUE and RLE. Wheelchair bound. Speech fluent intact.

## 2022-10-17 NOTE — REVIEW OF SYSTEMS
[Fatigue] : fatigue [Recent Wt Loss (___ Lbs)] : ~T recent [unfilled] lb weight loss [Cough] : cough [Dyspnea] : dyspnea [SOB on Exertion] : sob on exertion [Edema] : edema [Orthopnea] : orthopnea [GERD] : gerd [Nocturia] : nocturia [Arthralgias] : arthralgias [Blood Transfusion] : blood transfusion [Focal Weakness] : focal weakness [Memory Loss] : memory loss [Depression] : depression [Anxiety] : anxiety [Negative] : Allergy/Immunology [Fever] : no fever [Recent Wt Gain (___ Lbs)] : ~T no recent weight gain [Chills] : no chills [Poor Appetite] : no poor appetite [Dry Eyes] : no dry eyes [Epistaxis] : no epistaxis [Eye Irritation] : no eye irritation [Dry Mouth] : no dry mouth [Sinus Problems] : no sinus problems [Chest Tightness] : no chest tightness [Hemoptysis] : no hemoptysis [Sputum] : no sputum [Wheezing] : no wheezing [Abdominal Pain] : no abdominal pain [Nausea] : no nausea [Vomiting] : no vomiting [Diarrhea] : no diarrhea [Dysuria] : no dysuria [Rash] : no rash [Ulcerations] : no ulcerations [Telangiectasias] : no telangiectasias [Anemia] : no anemia [Easy Bruising] : no easy bruising [Seizures] : no seizures [Panic Attacks] : no panic attacks [Diabetes] : no diabetes [Thyroid Problem] : no thyroid problem [Obesity] : no obesity [TextBox_3] : Occasional fatigue. [TextBox_30] : since completed Abx no more cough [TextBox_44] : when lying on back gets SOB, 1-2 pillows to sleep, little edema both sides improved since hospital DC [TextBox_69] : on PPI [TextBox_83] : uses adult pamper, needs changing overnight [TextBox_94] : Hx chronic joint pain: feet, shoulder [TextBox_113] : 1 yr ago needed BT: gastric ulcer with hematemesis 2021, last EGD then. Hospitalized at [facility]  [TextBox_122] : CVA 2020 w/ R deficit [TextBox_137] : occasional anxiety and depression

## 2022-10-17 NOTE — REASON FOR VISIT
[Initial] : an initial visit [Pneumonia] : pneumonia [Family Member] : family member [TextBox_44] : Post Hospital Discharge f/u for Pneumonia

## 2022-10-17 NOTE — CURRENT MEDS
[Takes medication as prescribed] : takes [None] : Patient does not have any barriers to medication adherence [Blood Thinners] : blood thinners [FreeTextEntry1] : On eliquis for DVT

## 2022-10-17 NOTE — HISTORY OF PRESENT ILLNESS
[Never] : never [Insomnia] : insomnia [Awakes Unrefreshed] : awakes unrefreshed [Daytime Somnolence] : daytime somnolence [Difficulty Maintaining Sleep] : difficulty maintaining sleep [Fatigue] : fatigue [Frequent Nocturnal Awakening] : frequent nocturnal awakening [Nonrestorative Sleep] : nonrestorative sleep [Rheumatologic] : rheumatologic [DVT] : DVT [With Patient/Caregiver] : With Patient/Caregiver [Designated Healthcare Proxy] : Designated healthcare proxy [Name: ___] : Health Care Proxy's Name: [unfilled]  [Relationship: ___] : Relationship: [unfilled] [Aggressive treatment] : aggressive treatment [TextBox_4] : 76 yo F w/ Hx CVA w/ R deficit (2021), DVT on eliquis, arthritis, gastritis, PUD c/b acute upper GIB requiring transfusion (2021), COVID, HTN and HLD who is p/a hospital admission for PNA 7/25/2022. Daughter at bedside w/ , reports that Ms. Gallardo completed completed Cefuroximine BID ABx and after which her cough and all symptoms resolved. Back to baseline. Went to PCP post hospital discharge and told her that lungs sounded good. Filled albuterol Rx but never required it for any symptom relief. No new symptoms since completing antibiotics. No changes in med or new meds. \par Medical history of CVA at [facility] c/b Right hemiplegia, memory loss March 14, 2020. \par Had COVID infection 2x, 1st time was 2 Mo after stroke while in rehab rehab nursing home and 2nd was march this year 2022. \par Never needed oxygen. Since then never had COVID vaccine; does not want it. \par Still on eliquis for DVT in illiac vein s/p CVA. Since DC home has been eating less salt and less sugar adjusting diet. [Awakes with Headache] : does not awaken with headache [Cataplexy] : denies cataplexy [Recent  Weight Gain] : no recent weight gain [Snoring] : no snoring [Lower Extremity Discomfort] : does not have lower extremity discomfort [TextBox_19] : occasionally not sleeping well. sleep 8 or 5 hours depending. Almost always falls asleep during the day. Awakens several times during night, change diapers. [TextBox_77] : 23:00 [TextBox_79] : 07:00 [TextBox_9] : Arthritis (no meds, sometimes tylenol or advil) , illiac v. blood clot after stroke june 2020 (3-4 Mo later) [AdvancecareDate] : 08/16/2022 [FreeTextEntry4] : "All means necessary for her to be well"

## 2023-05-01 NOTE — PATIENT PROFILE ADULT - DISASTER - NSPROGENBLOODRESTRICT_GEN_A_NUR
unable to assess Azithromycin Pregnancy And Lactation Text: This medication is considered safe during pregnancy and is also secreted in breast milk.

## 2023-06-01 NOTE — PROGRESS NOTE ADULT - PROBLEM SELECTOR PROBLEM 2
Was the patient seen in the last year in this department? Yes    Does patient have an active prescription for medications requested? Yes    Received Request Via: Pharmacy   Future Appointments       Provider Department Center    11/25/2019 3:00 PM Yusra Cool M.D. [facility] [address]         
NSTEMI (non-ST elevated myocardial infarction)
NSTEMI (non-ST elevated myocardial infarction)
DISPLAY PLAN FREE TEXT
NSTEMI (non-ST elevated myocardial infarction)
NSTEMI (non-ST elevated myocardial infarction)
DISPLAY PLAN FREE TEXT
NSTEMI (non-ST elevated myocardial infarction)
NSTEMI (non-ST elevated myocardial infarction)
DISPLAY PLAN FREE TEXT
NSTEMI (non-ST elevated myocardial infarction)

## 2024-01-27 NOTE — ED ADULT NURSE NOTE - NSICDXPASTMEDICALHX_GEN_ALL_CORE_FT
PAST MEDICAL HISTORY:  Cerebrovascular accident (CVA)     H/O iliac vein thrombosis     HTN (hypertension)     Hypercholesterolemia     
PALPITATIONS/SHORTNESS OF BREATH